# Patient Record
Sex: MALE | Race: WHITE | Employment: OTHER | URBAN - METROPOLITAN AREA
[De-identification: names, ages, dates, MRNs, and addresses within clinical notes are randomized per-mention and may not be internally consistent; named-entity substitution may affect disease eponyms.]

---

## 2017-03-22 ENCOUNTER — OFFICE VISIT (OUTPATIENT)
Dept: PULMONOLOGY | Age: 72
End: 2017-03-22

## 2017-03-22 VITALS
SYSTOLIC BLOOD PRESSURE: 136 MMHG | HEIGHT: 72 IN | DIASTOLIC BLOOD PRESSURE: 86 MMHG | RESPIRATION RATE: 16 BRPM | OXYGEN SATURATION: 97 % | BODY MASS INDEX: 24.11 KG/M2 | WEIGHT: 178 LBS | HEART RATE: 79 BPM | TEMPERATURE: 97.5 F

## 2017-03-22 DIAGNOSIS — J44.9 CHRONIC OBSTRUCTIVE PULMONARY DISEASE, UNSPECIFIED COPD TYPE (HCC): Primary | ICD-10-CM

## 2017-03-22 DIAGNOSIS — Z72.0 TOBACCO ABUSE: ICD-10-CM

## 2017-03-22 PROCEDURE — 3017F COLORECTAL CA SCREEN DOC REV: CPT | Performed by: INTERNAL MEDICINE

## 2017-03-22 PROCEDURE — 1123F ACP DISCUSS/DSCN MKR DOCD: CPT | Performed by: INTERNAL MEDICINE

## 2017-03-22 PROCEDURE — 99213 OFFICE O/P EST LOW 20 MIN: CPT | Performed by: INTERNAL MEDICINE

## 2017-03-22 PROCEDURE — G8420 CALC BMI NORM PARAMETERS: HCPCS | Performed by: INTERNAL MEDICINE

## 2017-03-22 PROCEDURE — G8427 DOCREV CUR MEDS BY ELIG CLIN: HCPCS | Performed by: INTERNAL MEDICINE

## 2017-03-22 PROCEDURE — G8484 FLU IMMUNIZE NO ADMIN: HCPCS | Performed by: INTERNAL MEDICINE

## 2017-03-22 PROCEDURE — 3023F SPIROM DOC REV: CPT | Performed by: INTERNAL MEDICINE

## 2017-03-22 PROCEDURE — 4004F PT TOBACCO SCREEN RCVD TLK: CPT | Performed by: INTERNAL MEDICINE

## 2017-03-22 PROCEDURE — 4040F PNEUMOC VAC/ADMIN/RCVD: CPT | Performed by: INTERNAL MEDICINE

## 2017-03-22 PROCEDURE — G8926 SPIRO NO PERF OR DOC: HCPCS | Performed by: INTERNAL MEDICINE

## 2017-04-05 ENCOUNTER — HOSPITAL ENCOUNTER (OUTPATIENT)
Dept: OTHER | Age: 72
Discharge: HOME | End: 2017-04-05
Attending: FAMILY MEDICINE | Admitting: FAMILY MEDICINE

## 2017-04-05 ENCOUNTER — HOSPITAL ENCOUNTER (OUTPATIENT)
Dept: GENERAL RADIOLOGY | Age: 72
Discharge: HOME | End: 2017-04-05

## 2017-04-05 ENCOUNTER — OFFICE VISIT (OUTPATIENT)
Dept: FAMILY MEDICINE CLINIC | Age: 72
End: 2017-04-05

## 2017-04-05 VITALS
RESPIRATION RATE: 18 BRPM | OXYGEN SATURATION: 95 % | BODY MASS INDEX: 22.65 KG/M2 | DIASTOLIC BLOOD PRESSURE: 80 MMHG | HEART RATE: 90 BPM | WEIGHT: 167 LBS | SYSTOLIC BLOOD PRESSURE: 134 MMHG

## 2017-04-05 DIAGNOSIS — R05.9 COUGH: ICD-10-CM

## 2017-04-05 DIAGNOSIS — R06.02 SOB (SHORTNESS OF BREATH): Primary | ICD-10-CM

## 2017-04-05 DIAGNOSIS — R06.02 SOB (SHORTNESS OF BREATH): ICD-10-CM

## 2017-04-05 PROCEDURE — 1123F ACP DISCUSS/DSCN MKR DOCD: CPT | Performed by: FAMILY MEDICINE

## 2017-04-05 PROCEDURE — 3017F COLORECTAL CA SCREEN DOC REV: CPT | Performed by: FAMILY MEDICINE

## 2017-04-05 PROCEDURE — 99214 OFFICE O/P EST MOD 30 MIN: CPT | Performed by: FAMILY MEDICINE

## 2017-04-05 PROCEDURE — 4004F PT TOBACCO SCREEN RCVD TLK: CPT | Performed by: FAMILY MEDICINE

## 2017-04-05 PROCEDURE — G8427 DOCREV CUR MEDS BY ELIG CLIN: HCPCS | Performed by: FAMILY MEDICINE

## 2017-04-05 PROCEDURE — G8419 CALC BMI OUT NRM PARAM NOF/U: HCPCS | Performed by: FAMILY MEDICINE

## 2017-04-05 PROCEDURE — 93000 ELECTROCARDIOGRAM COMPLETE: CPT | Performed by: FAMILY MEDICINE

## 2017-04-05 PROCEDURE — 4040F PNEUMOC VAC/ADMIN/RCVD: CPT | Performed by: FAMILY MEDICINE

## 2017-04-05 RX ORDER — ALBUTEROL SULFATE 90 UG/1
2 AEROSOL, METERED RESPIRATORY (INHALATION) EVERY 6 HOURS PRN
Qty: 1 INHALER | Refills: 3 | Status: SHIPPED | OUTPATIENT
Start: 2017-04-05 | End: 2019-01-01 | Stop reason: SDUPTHER

## 2017-04-05 RX ORDER — AZITHROMYCIN 250 MG/1
TABLET, FILM COATED ORAL
Qty: 1 PACKET | Refills: 0 | Status: SHIPPED | OUTPATIENT
Start: 2017-04-05 | End: 2017-04-15

## 2017-04-13 ENCOUNTER — HOSPITAL ENCOUNTER (OUTPATIENT)
Dept: CT IMAGING | Age: 72
Discharge: HOME | End: 2017-04-13
Attending: INTERNAL MEDICINE | Admitting: INTERNAL MEDICINE

## 2017-04-13 DIAGNOSIS — C83.18 MANTLE CELL LYMPHOMA OF LYMPH NODES OF MULTIPLE SITES (HCC): ICD-10-CM

## 2017-06-16 PROBLEM — J93.9 PNEUMOTHORAX: Status: ACTIVE | Noted: 2017-06-16

## 2017-06-26 ENCOUNTER — OFFICE VISIT (OUTPATIENT)
Dept: FAMILY MEDICINE CLINIC | Age: 72
End: 2017-06-26

## 2017-06-26 VITALS
DIASTOLIC BLOOD PRESSURE: 80 MMHG | RESPIRATION RATE: 16 BRPM | WEIGHT: 166 LBS | HEART RATE: 83 BPM | OXYGEN SATURATION: 98 % | SYSTOLIC BLOOD PRESSURE: 132 MMHG | BODY MASS INDEX: 21.9 KG/M2

## 2017-06-26 DIAGNOSIS — F51.01 PRIMARY INSOMNIA: ICD-10-CM

## 2017-06-26 DIAGNOSIS — Z09 HOSPITAL DISCHARGE FOLLOW-UP: Primary | ICD-10-CM

## 2017-06-26 DIAGNOSIS — J93.9 PNEUMOTHORAX ON LEFT: ICD-10-CM

## 2017-06-26 PROCEDURE — 99213 OFFICE O/P EST LOW 20 MIN: CPT | Performed by: FAMILY MEDICINE

## 2017-06-26 RX ORDER — TRAZODONE HYDROCHLORIDE 50 MG/1
TABLET ORAL
Qty: 60 TABLET | Refills: 3 | Status: SHIPPED | OUTPATIENT
Start: 2017-06-26 | End: 2018-07-17 | Stop reason: SDUPTHER

## 2017-09-22 ENCOUNTER — HOSPITAL ENCOUNTER (OUTPATIENT)
Dept: CT IMAGING | Age: 72
Discharge: HOME | End: 2017-09-22
Attending: THORACIC SURGERY (CARDIOTHORACIC VASCULAR SURGERY) | Admitting: THORACIC SURGERY (CARDIOTHORACIC VASCULAR SURGERY)

## 2017-09-22 ENCOUNTER — HOSPITAL ENCOUNTER (OUTPATIENT)
Dept: CT IMAGING | Age: 72
Discharge: HOME | End: 2017-09-22
Attending: NURSE PRACTITIONER | Admitting: NURSE PRACTITIONER

## 2017-09-22 VITALS
RESPIRATION RATE: 19 BRPM | SYSTOLIC BLOOD PRESSURE: 148 MMHG | DIASTOLIC BLOOD PRESSURE: 77 MMHG | OXYGEN SATURATION: 98 % | HEART RATE: 58 BPM

## 2017-09-22 VITALS
WEIGHT: 168 LBS | BODY MASS INDEX: 22.26 KG/M2 | HEIGHT: 73 IN | HEART RATE: 90 BPM | OXYGEN SATURATION: 96 % | SYSTOLIC BLOOD PRESSURE: 131 MMHG | DIASTOLIC BLOOD PRESSURE: 80 MMHG | RESPIRATION RATE: 18 BRPM | TEMPERATURE: 99 F

## 2017-09-22 DIAGNOSIS — J93.9 PNEUMOTHORAX: ICD-10-CM

## 2017-09-22 DIAGNOSIS — C83.18 MANTLE CELL LYMPHOMA OF LYMPH NODES OF MULTIPLE SITES (HCC): ICD-10-CM

## 2017-09-22 DIAGNOSIS — J93.9 PNEUMOTHORAX ON LEFT: ICD-10-CM

## 2017-09-22 RX ORDER — OXYCODONE HYDROCHLORIDE AND ACETAMINOPHEN 5; 325 MG/1; MG/1
1 TABLET ORAL EVERY 4 HOURS PRN
Status: DISCONTINUED | OUTPATIENT
Start: 2017-09-22 | End: 2017-09-23 | Stop reason: HOSPADM

## 2017-09-22 RX ORDER — OXYCODONE HYDROCHLORIDE AND ACETAMINOPHEN 5; 325 MG/1; MG/1
2 TABLET ORAL EVERY 4 HOURS PRN
Status: DISCONTINUED | OUTPATIENT
Start: 2017-09-22 | End: 2017-09-23 | Stop reason: HOSPADM

## 2017-09-22 RX ORDER — MIDAZOLAM HYDROCHLORIDE 5 MG/ML
INJECTION INTRAMUSCULAR; INTRAVENOUS
Status: COMPLETED | OUTPATIENT
Start: 2017-09-22 | End: 2017-09-22

## 2017-09-22 RX ORDER — ONDANSETRON 2 MG/ML
4 INJECTION INTRAMUSCULAR; INTRAVENOUS EVERY 8 HOURS PRN
Status: DISCONTINUED | OUTPATIENT
Start: 2017-09-22 | End: 2017-09-23 | Stop reason: HOSPADM

## 2017-09-22 RX ORDER — FENTANYL CITRATE 50 UG/ML
INJECTION, SOLUTION INTRAMUSCULAR; INTRAVENOUS
Status: COMPLETED | OUTPATIENT
Start: 2017-09-22 | End: 2017-09-22

## 2017-09-22 RX ORDER — ACETAMINOPHEN 325 MG/1
650 TABLET ORAL EVERY 4 HOURS PRN
Status: DISCONTINUED | OUTPATIENT
Start: 2017-09-22 | End: 2017-09-23 | Stop reason: HOSPADM

## 2017-09-22 RX ORDER — SODIUM CHLORIDE 9 MG/ML
INJECTION, SOLUTION INTRAVENOUS ONCE
Status: COMPLETED | OUTPATIENT
Start: 2017-09-22 | End: 2017-09-22

## 2017-09-22 RX ADMIN — SODIUM CHLORIDE: 9 INJECTION, SOLUTION INTRAVENOUS at 14:36

## 2017-09-22 RX ADMIN — FENTANYL CITRATE 50 MCG: 50 INJECTION, SOLUTION INTRAMUSCULAR; INTRAVENOUS at 15:46

## 2017-09-22 RX ADMIN — MIDAZOLAM HYDROCHLORIDE 1 MG: 5 INJECTION INTRAMUSCULAR; INTRAVENOUS at 15:46

## 2017-09-22 ASSESSMENT — PAIN - FUNCTIONAL ASSESSMENT: PAIN_FUNCTIONAL_ASSESSMENT: 0-10

## 2017-09-28 ENCOUNTER — TELEPHONE (OUTPATIENT)
Dept: PHARMACY | Facility: CLINIC | Age: 72
End: 2017-09-28

## 2017-09-28 ENCOUNTER — CARE COORDINATION (OUTPATIENT)
Dept: CASE MANAGEMENT | Age: 72
End: 2017-09-28

## 2017-09-29 ENCOUNTER — TELEPHONE (OUTPATIENT)
Dept: FAMILY MEDICINE CLINIC | Age: 72
End: 2017-09-29

## 2017-10-01 ENCOUNTER — CARE COORDINATION (OUTPATIENT)
Dept: CASE MANAGEMENT | Age: 72
End: 2017-10-01

## 2017-10-02 ENCOUNTER — OFFICE VISIT (OUTPATIENT)
Dept: FAMILY MEDICINE CLINIC | Age: 72
End: 2017-10-02

## 2017-10-02 ENCOUNTER — TELEPHONE (OUTPATIENT)
Dept: FAMILY MEDICINE CLINIC | Age: 72
End: 2017-10-02

## 2017-10-02 VITALS
DIASTOLIC BLOOD PRESSURE: 92 MMHG | BODY MASS INDEX: 21.77 KG/M2 | HEART RATE: 83 BPM | SYSTOLIC BLOOD PRESSURE: 138 MMHG | WEIGHT: 165 LBS | OXYGEN SATURATION: 96 % | TEMPERATURE: 98.5 F

## 2017-10-02 DIAGNOSIS — L25.9 CONTACT DERMATITIS, UNSPECIFIED CONTACT DERMATITIS TYPE, UNSPECIFIED TRIGGER: Primary | ICD-10-CM

## 2017-10-02 PROCEDURE — 1123F ACP DISCUSS/DSCN MKR DOCD: CPT | Performed by: NURSE PRACTITIONER

## 2017-10-02 PROCEDURE — G8420 CALC BMI NORM PARAMETERS: HCPCS | Performed by: NURSE PRACTITIONER

## 2017-10-02 PROCEDURE — G8427 DOCREV CUR MEDS BY ELIG CLIN: HCPCS | Performed by: NURSE PRACTITIONER

## 2017-10-02 PROCEDURE — 3017F COLORECTAL CA SCREEN DOC REV: CPT | Performed by: NURSE PRACTITIONER

## 2017-10-02 PROCEDURE — 99213 OFFICE O/P EST LOW 20 MIN: CPT | Performed by: NURSE PRACTITIONER

## 2017-10-02 PROCEDURE — G8484 FLU IMMUNIZE NO ADMIN: HCPCS | Performed by: NURSE PRACTITIONER

## 2017-10-02 PROCEDURE — 4004F PT TOBACCO SCREEN RCVD TLK: CPT | Performed by: NURSE PRACTITIONER

## 2017-10-02 PROCEDURE — 1111F DSCHRG MED/CURRENT MED MERGE: CPT | Performed by: NURSE PRACTITIONER

## 2017-10-02 PROCEDURE — 4040F PNEUMOC VAC/ADMIN/RCVD: CPT | Performed by: NURSE PRACTITIONER

## 2017-10-02 RX ORDER — TRIAMCINOLONE ACETONIDE 1 MG/G
CREAM TOPICAL
Qty: 45 G | Refills: 0 | Status: SHIPPED | OUTPATIENT
Start: 2017-10-02 | End: 2018-01-04 | Stop reason: ALTCHOICE

## 2017-10-02 NOTE — PROGRESS NOTES
Patient: Lincoln oYrk is a 71 y.o. male who presents today with the following Chief Complaint(s):  Chief Complaint   Patient presents with    Rash     blisters around chest tube         HPI   Patient is a 70 yo male who is here with c/o blisters/red rash around the site of his chest tube. Last Tuesday got chest tube removed. Went home last Wednesday. Rash started on Friday. Has been using a guaze and paper tape to cover the CT opening. He states the rash is a little itchy but is tender to touch. Has been taking 25 mg benedryl TID. Started benedryl Saturday. His wife states she was cleaning the area around the insertion site with alcohol pads because it was all blistery. Appt with Dr. Meena Porter Wednesday      Current Outpatient Prescriptions   Medication Sig Dispense Refill    triamcinolone (KENALOG) 0.1 % cream Apply topically 2 times daily. 45 g 0    traZODone (DESYREL) 50 MG tablet 1-2 po qhs for sleep 60 tablet 3    ibrutinib (IMBRUVICA) 140 MG chemo capsule Take 4 capsules by mouth daily Indications: 4 capsules daily 120 capsule 3    albuterol sulfate HFA (PROAIR HFA) 108 (90 BASE) MCG/ACT inhaler Inhale 2 puffs into the lungs every 6 hours as needed for Wheezing or Shortness of Breath 1 Inhaler 3    Multiple Vitamins-Iron (MULTIVITAMIN/IRON PO) Take 1 tablet by mouth daily        No current facility-administered medications for this visit. Patient's past medical history, surgical history, family history, medications,  and allergies  were all reviewed and updated as appropriate today. Review of Systems   Skin: Positive for itching (slight itching sometimes but tender to touch) and rash. Physical Exam   Constitutional: He is oriented to person, place, and time. He appears well-developed and well-nourished. HENT:   Head: Normocephalic and atraumatic. Eyes: Conjunctivae are normal.   Cardiovascular: Normal rate, regular rhythm and normal heart sounds.     No murmur heard.  Pulmonary/Chest: Effort normal and breath sounds normal. No respiratory distress. He has no wheezes. He has no rales. Abdominal:       Neurological: He is alert and oriented to person, place, and time. Skin: Skin is warm and dry. Rash noted. There is erythema. Large red, dried rash with slight oozing around CT insertion site. dermatitis   Psychiatric: He has a normal mood and affect. His behavior is normal. Judgment and thought content normal.   Nursing note and vitals reviewed. Vitals:    10/02/17 1406   BP: (!) 138/92   Pulse: 83   Temp: 98.5 °F (36.9 °C)   SpO2: 96%       Assessment:  Encounter Diagnosis   Name Primary?  Contact dermatitis, unspecified contact dermatitis type, unspecified trigger Yes       Plan:  1. Contact dermatitis, unspecified contact dermatitis type, unspecified trigger    - triamcinolone (KENALOG) 0.1 % cream; Apply topically 2 times daily.   Dispense: 45 g; Refill: 0    Discussed with Dr. Birdie Dancer

## 2017-10-02 NOTE — MR AVS SNAPSHOT
Tetanus Combination Vaccine (1 - Tdap) 1/2/2005    Colonoscopy 4/14/2019    Cholesterol Screening 6/17/2022            MyChart Signup           Our records indicate that you have an active [employer] Studiot account. You can view your After Visit Summary by going to [email or web address] and logging in with your Hark username and password. If you don't have a Hark username and password but a parent or guardian has access to your record, the parent or guardian should login with their own [employer] Studiot username and password and access your record to view the After Visit Summary. Additional Information  If you have questions, please contact the physician practice where you receive care. Remember, Hark is NOT to be used for urgent needs. For medical emergencies, dial 911. For questions regarding your Hark account call [phone]. If you have a clinical question, please call your doctor's office.

## 2017-10-03 ENCOUNTER — CARE COORDINATION (OUTPATIENT)
Dept: CASE MANAGEMENT | Age: 72
End: 2017-10-03

## 2017-10-04 ENCOUNTER — OFFICE VISIT (OUTPATIENT)
Dept: FAMILY MEDICINE CLINIC | Age: 72
End: 2017-10-04

## 2017-10-04 VITALS
BODY MASS INDEX: 21.77 KG/M2 | SYSTOLIC BLOOD PRESSURE: 126 MMHG | HEART RATE: 87 BPM | OXYGEN SATURATION: 97 % | DIASTOLIC BLOOD PRESSURE: 82 MMHG | TEMPERATURE: 97.6 F | WEIGHT: 165 LBS

## 2017-10-04 DIAGNOSIS — L23.1 ALLERGIC CONTACT DERMATITIS DUE TO ADHESIVES: ICD-10-CM

## 2017-10-04 DIAGNOSIS — Z09 HOSPITAL DISCHARGE FOLLOW-UP: Primary | ICD-10-CM

## 2017-10-04 DIAGNOSIS — J93.11 PRIMARY SPONTANEOUS PNEUMOTHORAX: ICD-10-CM

## 2017-10-04 PROCEDURE — 99495 TRANSJ CARE MGMT MOD F2F 14D: CPT | Performed by: FAMILY MEDICINE

## 2017-10-04 NOTE — MR AVS SNAPSHOT
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 ([facility]    Chest pain    Panlobular emphysema ([facility] [address]    Pneumothorax    Autologous bone marrow transplantation    Primary spontaneous pneumothorax    COPD (chronic obstructive pulmonary disease) ([facility] [facility]    Tobacco abuse disorder      Immunizations as of 10/4/2017     Name Date    Influenza Virus Vaccine 12/8/2012    Influenza, High Dose 11/4/2016, 9/28/2015, 10/15/2014    Influenza, MDCK Quadrivalent, Preservative Free 9/23/2017    Pneumococcal 13-valent Conjugate ([id number]) 9/23/2017    Pneumococcal Conjugate 7-valent 9/3/2009    Pneumococcal Polysaccharide ([id number]) 3/24/2014    Tetanus 1/1/2005      Preventive Care        Date Due    Hepatitis C screening is recommended for all adults regardless of risk factors born between [facility] at least once (lifetime) who have never been tested. 1945    Tetanus Combination Vaccine (1 - Tdap) 1/2/2005    Colonoscopy 4/14/2019    Cholesterol Screening 6/17/2022            Orca Digitalt Signup           Our records indicate that you have an active viaCycle account. You can view your After Visit Summary by going to [email or web address] and logging in with your viaCycle username and password. If you don't have a viaCycle username and password but a parent or guardian has access to your record, the parent or guardian should login with their own viaCycle username and password and access your record to view the After Visit Summary. Additional Information  If you have questions, please contact the physician practice where you receive care. Remember, viaCycle is NOT to be used for urgent needs. For medical emergencies, dial 911. For questions regarding your viaCycle account call [phone].  If you have a clinical question, please call your doctor's office.

## 2017-10-04 NOTE — PROGRESS NOTES
Post-Discharge Transitional Care Management Services      [facility]   YOB: 1945    Date of Visit:  10/4/2017    Allergies   Allergen Reactions    Latex Rash     Outpatient Prescriptions Marked as Taking for the 10/4/17 encounter (Office Visit) with Sherly Parrish MD   Medication Sig Dispense Refill    triamcinolone (KENALOG) 0.1 % cream Apply topically 2 times daily. 45 g 0    traZODone (DESYREL) 50 MG tablet 1-2 po qhs for sleep 60 tablet 3    ibrutinib (IMBRUVICA) 140 MG chemo capsule Take 4 capsules by mouth daily Indications: 4 capsules daily 120 capsule 3    albuterol sulfate HFA (PROAIR HFA) 108 (90 BASE) MCG/ACT inhaler Inhale 2 puffs into the lungs every 6 hours as needed for Wheezing or Shortness of Breath 1 Inhaler 3    Multiple Vitamins-Iron (MULTIVITAMIN/IRON PO) Take 1 tablet by mouth daily            Vitals:    10/04/17 1104   BP: 126/82   Site: Left Arm   Position: Sitting   Cuff Size: Small Adult   Pulse: 87   Temp: 97.6 °F (36.4 °C)   TempSrc: Oral   SpO2: 97%     There is no height or weight on file to calculate BMI. Wt Readings from Last 3 Encounters:   10/02/17 165 lb (74.8 kg)   09/27/17 160 lb 9.6 oz (72.8 kg)   09/22/17 168 lb (76.2 kg)     BP Readings from Last 3 Encounters:   10/04/17 126/82   10/02/17 (!) 138/92   09/27/17 (!) 151/70        Patient was admitted to [address] [place] [address] from 9/22/17 to 9/27/17 for spontaneous L pneumothorax. Pt had had R pneumothoraces in past but had not had previous L sided lung collapse. 2 days after d/c home, noted non itchy but painful vesicular rash around site of chest tube. Pt used po benadryl and then TAC cream for rash. Chest tube was removed 9/26/17 but pt was held over noc 2/2 bleeding at the site of chest tube. A heavy pressure dressing was used over vasoline gauze. Pt is latex allergic and all dressings were latex free. Rash is receeding, less painful, no further blisters of d/c.  Center of

## 2017-10-06 ENCOUNTER — CARE COORDINATION (OUTPATIENT)
Dept: CASE MANAGEMENT | Age: 72
End: 2017-10-06

## 2017-10-10 ENCOUNTER — CARE COORDINATION (OUTPATIENT)
Dept: CASE MANAGEMENT | Age: 72
End: 2017-10-10

## 2018-01-04 ENCOUNTER — HOSPITAL ENCOUNTER (OUTPATIENT)
Dept: GENERAL RADIOLOGY | Age: 73
Discharge: HOME | End: 2018-01-04

## 2018-01-04 ENCOUNTER — TELEPHONE (OUTPATIENT)
Dept: FAMILY MEDICINE CLINIC | Age: 73
End: 2018-01-04

## 2018-01-04 ENCOUNTER — HOSPITAL ENCOUNTER (OUTPATIENT)
Dept: OTHER | Age: 73
Discharge: HOME | End: 2018-01-04
Attending: FAMILY MEDICINE | Admitting: FAMILY MEDICINE

## 2018-01-04 ENCOUNTER — OFFICE VISIT (OUTPATIENT)
Dept: FAMILY MEDICINE CLINIC | Age: 73
End: 2018-01-04

## 2018-01-04 VITALS
BODY MASS INDEX: 21.51 KG/M2 | WEIGHT: 163 LBS | HEART RATE: 97 BPM | OXYGEN SATURATION: 95 % | DIASTOLIC BLOOD PRESSURE: 86 MMHG | SYSTOLIC BLOOD PRESSURE: 142 MMHG | TEMPERATURE: 97.6 F

## 2018-01-04 DIAGNOSIS — R06.09 DOE (DYSPNEA ON EXERTION): ICD-10-CM

## 2018-01-04 DIAGNOSIS — R06.2 WHEEZE: ICD-10-CM

## 2018-01-04 DIAGNOSIS — R06.02 SOB (SHORTNESS OF BREATH): ICD-10-CM

## 2018-01-04 DIAGNOSIS — R06.02 SOB (SHORTNESS OF BREATH): Primary | ICD-10-CM

## 2018-01-04 DIAGNOSIS — J44.9 CHRONIC OBSTRUCTIVE PULMONARY DISEASE, UNSPECIFIED COPD TYPE (HCC): ICD-10-CM

## 2018-01-04 DIAGNOSIS — J93.83 SPONTANEOUS PNEUMOTHORAX: ICD-10-CM

## 2018-01-04 DIAGNOSIS — Z72.0 TOBACCO ABUSE DISORDER: ICD-10-CM

## 2018-01-04 PROCEDURE — G8484 FLU IMMUNIZE NO ADMIN: HCPCS | Performed by: FAMILY MEDICINE

## 2018-01-04 PROCEDURE — G8427 DOCREV CUR MEDS BY ELIG CLIN: HCPCS | Performed by: FAMILY MEDICINE

## 2018-01-04 PROCEDURE — 1123F ACP DISCUSS/DSCN MKR DOCD: CPT | Performed by: FAMILY MEDICINE

## 2018-01-04 PROCEDURE — 4040F PNEUMOC VAC/ADMIN/RCVD: CPT | Performed by: FAMILY MEDICINE

## 2018-01-04 PROCEDURE — G8420 CALC BMI NORM PARAMETERS: HCPCS | Performed by: FAMILY MEDICINE

## 2018-01-04 PROCEDURE — 99214 OFFICE O/P EST MOD 30 MIN: CPT | Performed by: FAMILY MEDICINE

## 2018-01-04 PROCEDURE — 4004F PT TOBACCO SCREEN RCVD TLK: CPT | Performed by: FAMILY MEDICINE

## 2018-01-04 PROCEDURE — 93000 ELECTROCARDIOGRAM COMPLETE: CPT | Performed by: FAMILY MEDICINE

## 2018-01-04 PROCEDURE — 3017F COLORECTAL CA SCREEN DOC REV: CPT | Performed by: FAMILY MEDICINE

## 2018-01-04 PROCEDURE — G8926 SPIRO NO PERF OR DOC: HCPCS | Performed by: FAMILY MEDICINE

## 2018-01-04 PROCEDURE — 3023F SPIROM DOC REV: CPT | Performed by: FAMILY MEDICINE

## 2018-01-04 ASSESSMENT — ENCOUNTER SYMPTOMS
COUGH: 1
CHEST TIGHTNESS: 0
WHEEZING: 1
STRIDOR: 1

## 2018-01-04 NOTE — TELEPHONE ENCOUNTER
LM for pt to call back - The radiologist from [facility] called to confirm the pt has a pneumothorax. Per Dr. Becca Shirley pt needs to go to the ER for treatment immediately. She has contacted the ED to let them know to expect him.

## 2018-01-04 NOTE — PROGRESS NOTES
Subjective:      Patient ID: Yamileth Linares is a 71 y.o. male. HPI  Chief Complaint   Patient presents with    Fatigue    Cough    Shortness of Breath     intermittent. x 1 wk    In 9/17, had routine CT from Dr Derrek Bradford for f/u lymphoma, was incidentally found to have recurrent spont pneumothorax. Was admitted for chest tube. Pt was doing well until he noted decreased apptetite 1wk ago, soon thereafter noted episodic sob with or w/o exertion, on/off cough, productive at times. No fever. Feels listless, denies achiness. Notes occ wheeze. No cp. No LE edema. Pt has copd and is a 1 ppd smoker. Review of Systems   Constitutional: Positive for activity change, appetite change and fatigue. Negative for chills, diaphoresis and fever. Respiratory: Positive for cough, wheezing and stridor. Negative for chest tightness. Cardiovascular: Negative for chest pain, palpitations and leg swelling. Objective:   Physical Exam  BP (!) 142/86   Pulse 97   Temp 97.6 °F (36.4 °C) (Oral)   Wt 163 lb (73.9 kg)   SpO2 95%   BMI 21.51 kg/m²   HEENT nl, Neck supple, no lad or tmg, no bruit  CV RRR, Lungs faint L basilar wheeze and faint R basilar coarsenss/rhonchi. No rales  Ext w/o edema  Neg b/l Homans  Assessment:            Plan:      Walker Zarate was seen today for fatigue, cough and shortness of breath. Diagnoses and all orders for this visit:    SOB (shortness of breath), MEDRANO  -     EKG 12 Lead 0-1 mm ST depression in leads 2 and 3, probably not pathologic. Will further eval if cxr neg.  -     XR CHEST STANDARD (2 VW); Future   Unclear etiology. Pna, recurrent spont pneumothorax, copd exacerbation and cardiac etiology considered. Further orders after cxr is available. Spontaneous pneumothorax by history   May need hospitalization for chest tube pending cxr. Chronic obstructive pulmonary disease, unspecified COPD type ([address] Wheeze   tob cessation encouraged.         Addendum: CXR pos for mod-large L

## 2018-01-06 PROBLEM — I48.92 ATRIAL FLUTTER (HCC): Status: ACTIVE | Noted: 2018-01-06

## 2018-01-06 PROBLEM — I48.0 PAF (PAROXYSMAL ATRIAL FIBRILLATION) (HCC): Status: ACTIVE | Noted: 2018-01-06

## 2018-01-09 PROBLEM — I48.3 TYPICAL ATRIAL FLUTTER (HCC): Status: ACTIVE | Noted: 2018-01-06

## 2018-01-13 ENCOUNTER — CARE COORDINATION (OUTPATIENT)
Dept: CASE MANAGEMENT | Age: 73
End: 2018-01-13

## 2018-01-13 DIAGNOSIS — J93.83 SPONTANEOUS PNEUMOTHORAX: Primary | ICD-10-CM

## 2018-01-13 PROCEDURE — 1111F DSCHRG MED/CURRENT MED MERGE: CPT | Performed by: FAMILY MEDICINE

## 2018-01-13 NOTE — CARE COORDINATION
[facility] Transitions Initial Follow Up Call    Call within 2 business days of discharge: Yes    Patient: Debra Khan Patient : 1945   MRN: [id number]  Reason for Admission: Spontaneous Pneumothorax  Discharge Date: 18 RARS: [facility] Risk Score: 17.5     Spoke with: patient's wife Boyd Silvestre: [facility]    Non-face-to-face services provided:  Scheduled appointment with PCP-appt with Dr. Jina Frias  at 3:15  Obtained and reviewed discharge summary and/or continuity of care documents  Education of patient/family/caregiver/guardian to support self-management-s/s when to call MD    Care Transitions 24 Hour Call    Do you have any ongoing symptoms?:  Yes  Patient-reported symptoms:  Pain, Fatigue  Do you have a copy of your discharge instructions?:  Yes  Do you have all of your prescriptions and are they filled?:  Yes  Have you been contacted by a Zuhair Patel Pharmacist?:  No  Have you scheduled your follow up appointment?:  Yes  Were you discharged with any Home Care or Post Acute Services:  No  Patient Home Equipment:  Nebulizer  Do you have support at home?:  Partner/Spouse/SO  Are you an active caregiver in your home?:  No  Care Transitions Interventions     Other Services:  Declined (Comment: [facility])      Wife reports he's doing good just tired. States he's getting around well. Has pain at CT removal site only when he moves certain ways, has not had to take any pain medications. States she will be changing dressing today. Told her to look for any yellow drainage, inflammation, swelling, warmth, fever and notify MD if present. Said his breathing is fine, told her to make sure he is doing deep breathing exercises, using IS at least hourly while awake. Completed med review. She allowed me to schedule f/u PCP appt. Denied any needs. Agreeable to f/u calls.      Follow Up  Future Appointments  Date Time Provider Gricelda Hurtado   2018 3:15 PM MD LUCILA Chacon

## 2018-01-15 ENCOUNTER — TELEPHONE (OUTPATIENT)
Dept: CARDIOTHORACIC SURGERY | Age: 73
End: 2018-01-15

## 2018-01-15 NOTE — TELEPHONE ENCOUNTER
Patient underwent LVAT, EDSON wedge, and mech and talc pleurodesis on 1/8/18 with Dr. Laron Gonzalez. He was discharged home with no home care on 1/12/18. I called him today for his surgical follow up. I spoke to his wife. She reports that he is doing fine. Denies SOB. He is using his IS as instructed and get it up to 2500. He does have occasional productive cough with loose, clear sputum. Denies fever. Incisions are healing well. There is redness and drainage from his CT site. His wife describes it as serosanguinous. He has developed a rash since surgery that they think may be related to an allergic reaction to tape. His appetite is fair but improving. Having regular bowel movements. Pain is well controlled with medications. Sleeping well at night. Due to the redness and drainage around CT site, the patient and his wife would feel more comfortable coming in to see us in the office this week instead of next week. I went ahead and added him to our schedule. No further questions or concerns at this time.

## 2018-01-17 ENCOUNTER — OFFICE VISIT (OUTPATIENT)
Dept: CARDIOTHORACIC SURGERY | Age: 73
End: 2018-01-17

## 2018-01-17 VITALS
OXYGEN SATURATION: 94 % | DIASTOLIC BLOOD PRESSURE: 82 MMHG | TEMPERATURE: 97 F | HEART RATE: 86 BPM | BODY MASS INDEX: 21.07 KG/M2 | HEIGHT: 73 IN | WEIGHT: 159 LBS | SYSTOLIC BLOOD PRESSURE: 134 MMHG

## 2018-01-17 DIAGNOSIS — J44.9 CHRONIC OBSTRUCTIVE PULMONARY DISEASE, UNSPECIFIED COPD TYPE (HCC): ICD-10-CM

## 2018-01-17 DIAGNOSIS — J93.11 PRIMARY SPONTANEOUS PNEUMOTHORAX: Primary | ICD-10-CM

## 2018-01-17 PROCEDURE — 99024 POSTOP FOLLOW-UP VISIT: CPT | Performed by: THORACIC SURGERY (CARDIOTHORACIC VASCULAR SURGERY)

## 2018-01-17 NOTE — PROGRESS NOTES
Cardiac, Vascular [facility] Thoracic Surgeons  Clinic Note     1/17/2018 4:57 PM  Surgeon:  Carly Moss     S/P : Left video-assisted thoracoscopy with pleurodesis     Chief complaint :  post op follow-up  Subjective:  Mr. Saumya Lamb   No major complain or event since seen last  Vital Signs: /82 (Site: Right Arm, Position: Sitting, Cuff Size: Medium Adult)   Pulse 86   Temp 97 °F (36.1 °C) (Oral)   Ht 6' 1\" (1.854 m)   Wt 159 lb (72.1 kg)   SpO2 94%   BMI 20.98 kg/m²      I/O:  No intake or output data in the 24 hours ending 01/17/18 1657    Exam:   Cardiovascular: S1 plus S2 +0 no additional sounds  Pulmonary: Bilaterally clear      Incision: Dry and clean    Labs:   CBC: No results for input(s): WBC, HGB, HCT, MCV, PLT in the last 72 hours. BMP: No results for input(s): NA, K, CL, CO2, PHOS, BUN, CREATININE in the last 72 hours. Invalid input(s): CA  PT/INR: No results for input(s): PROTIME, INR in the last 72 hours. APTT: No results for input(s): APTT in the last 72 hours. 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 Chest x-ray showed full expansion with good fibrotic reaction  2.  No need for field to follow-up follow-up in a p.r.n. basis    Joey Bourne MD FACS

## 2018-01-18 ENCOUNTER — CARE COORDINATION (OUTPATIENT)
Dept: CASE MANAGEMENT | Age: 73
End: 2018-01-18

## 2018-01-18 ENCOUNTER — TELEPHONE (OUTPATIENT)
Dept: PULMONOLOGY | Age: 73
End: 2018-01-18

## 2018-01-22 ENCOUNTER — OFFICE VISIT (OUTPATIENT)
Dept: FAMILY MEDICINE CLINIC | Age: 73
End: 2018-01-22

## 2018-01-22 VITALS
BODY MASS INDEX: 21.51 KG/M2 | WEIGHT: 163 LBS | OXYGEN SATURATION: 96 % | SYSTOLIC BLOOD PRESSURE: 104 MMHG | DIASTOLIC BLOOD PRESSURE: 60 MMHG | HEART RATE: 70 BPM

## 2018-01-22 DIAGNOSIS — Z09 HOSPITAL DISCHARGE FOLLOW-UP: Primary | ICD-10-CM

## 2018-01-22 DIAGNOSIS — I48.0 PAROXYSMAL ATRIAL FIBRILLATION (HCC): ICD-10-CM

## 2018-01-22 DIAGNOSIS — J93.83 SPONTANEOUS PNEUMOTHORAX: ICD-10-CM

## 2018-01-22 PROCEDURE — 1111F DSCHRG MED/CURRENT MED MERGE: CPT | Performed by: FAMILY MEDICINE

## 2018-01-22 PROCEDURE — 99495 TRANSJ CARE MGMT MOD F2F 14D: CPT | Performed by: FAMILY MEDICINE

## 2018-01-22 NOTE — PROGRESS NOTES
had new onset, asymptomatic P A Fib. No obvious P A fib since home. Pt had rash from adhesive around CT site as he had in past. Kenolog janet marie. Pt jeff is sensitive/allergic to adhesive. Non face to face  following discharge, date last encounter closed (first attempt may have been earlier): 1/13/2018 11:10 AM    Call initiated 2 business days of discharge: Yes     Interval history/Current status: As above. Feeling better, no longer sob, energy is gradually returning. Physical Exam:  /60 (Site: Left Arm, Position: Sitting, Cuff Size: Medium Adult)   Pulse 70   Wt 163 lb (73.9 kg)   SpO2 96%   BMI 21.51 kg/m²   Neck supple, no lad or tmg, no bruit  CV RRR, Lungs CTA  L lat chest wall with resolving contact derm  4 L lat chest wall scabs from CT and pleurodesis, healing well        Assessment/Plan:  Prakash Richter was seen today for follow-up from hospital.    Diagnoses and all orders for this visit:    Hospital discharge follow-up  -     AK DISCHARGE MEDS RECONCILED W/ CURRENT OUTPATIENT MED LIST    Spontaneous pneumothorax   Clinically resolved    Paroxysmal atrial fibrillation (HCC)   Will follow.  CPM.        Medical Decision Making: moderate complexity

## 2018-01-23 PROBLEM — J93.9 PNEUMOTHORAX: Status: RESOLVED | Noted: 2017-06-16 | Resolved: 2018-01-23

## 2018-01-25 ENCOUNTER — CARE COORDINATION (OUTPATIENT)
Dept: CASE MANAGEMENT | Age: 73
End: 2018-01-25

## 2018-01-25 NOTE — CARE COORDINATION
[facility] Transitions Follow Up Call    2018    Patient: Samm Burn  Patient : 1945   MRN: [id number]  Reason for Admission: Spontaneous Pneumothorax  Discharge Date: 18 RARS: Risk Score: 17.5       Spoke with: wife [facility] Transitions Subsequent and Final Call    Subsequent and Final Calls  Have your medications changed?:  No  Do you have any questions related to your medications?:  No  Do you currently have any active services?:  No  Do you have any needs or concerns that I can assist you with?:  No  Identified Barriers:  None  Care Transitions Interventions     Other Services:  Declined (Comment: [facility])   Other Interventions:        Informed wife of final transition call. She states patient is doing a lot better, said he seems to have \"turned the bend\". Said his breathing is good, no acute episodes of SOB, pain has subsided, is up and moving around. Still using IS and acapella, knows he needs to keep deep breathing to prevent pneumonia. Appetite improving. Said rash is starting to clear up. Educated patient on s/s of the flu:  Fever or feeling feverish/chills,Cough, Sore throat, Runny or stuffy nose, Muscle or body aches, Headaches Fatigue (tiredness), Some people may have vomiting and diarrhea, Difficulty breathing. Advised patient to notify PCP/seek medical attention at onset of any of the above sx. Advised patient to avoid crowds unnecessarily, wash hands frequently with soap and water and to get flu vaccine if they haven't already done so if isn't medically contraindicated. She verbalized understanding and was in agreement, said they have both been staying in the house and not letting visitors come over. Denies any needs at present time, very appreciative of f/u calls. No further CTC outreach indicated.     Follow Up  Future Appointments  Date Time Provider Gricelda Hurtado   2018 1:30 PM Mounika Hickey CNP D.A.M. [employer] [facility]       Ligia Andrews

## 2018-02-13 ENCOUNTER — HOSPITAL ENCOUNTER (OUTPATIENT)
Dept: CT IMAGING | Age: 73
Discharge: HOME | End: 2018-02-13
Attending: INTERNAL MEDICINE | Admitting: INTERNAL MEDICINE

## 2018-02-13 DIAGNOSIS — C83.18 LYMPHOMA, MANTLE CELL, MULTIPLE SITES (HCC): ICD-10-CM

## 2018-02-13 DIAGNOSIS — C83.18 MANTLE CELL LYMPHOMA OF LYMPH NODES OF MULTIPLE SITES (HCC): ICD-10-CM

## 2018-02-13 LAB
A/G RATIO: 1.5 (ref 1.1–2.2)
ALBUMIN SERPL-MCNC: 3.7 G/DL (ref 3.4–5)
ALP BLD-CCNC: 108 U/L (ref 40–129)
ALT SERPL-CCNC: 12 U/L (ref 10–40)
ANION GAP SERPL CALCULATED.3IONS-SCNC: 9 MMOL/L (ref 3–16)
AST SERPL-CCNC: 17 U/L (ref 15–37)
BILIRUB SERPL-MCNC: 0.6 MG/DL (ref 0–1)
BUN BLDV-MCNC: 11 MG/DL (ref 7–20)
CALCIUM SERPL-MCNC: 9.2 MG/DL (ref 8.3–10.6)
CHLORIDE BLD-SCNC: 100 MMOL/L (ref 99–110)
CO2: 28 MMOL/L (ref 21–32)
CREAT SERPL-MCNC: 0.9 MG/DL (ref 0.8–1.3)
GFR AFRICAN AMERICAN: >60
GFR NON-AFRICAN AMERICAN: >60
GLOBULIN: 2.5 G/DL
GLUCOSE BLD-MCNC: 94 MG/DL (ref 70–99)
POTASSIUM SERPL-SCNC: 4.2 MMOL/L (ref 3.5–5.1)
SODIUM BLD-SCNC: 137 MMOL/L (ref 136–145)
TOTAL PROTEIN: 6.2 G/DL (ref 6.4–8.2)

## 2018-02-21 ENCOUNTER — OFFICE VISIT (OUTPATIENT)
Dept: CARDIOLOGY CLINIC | Age: 73
End: 2018-02-21

## 2018-02-21 VITALS
WEIGHT: 163 LBS | SYSTOLIC BLOOD PRESSURE: 142 MMHG | HEIGHT: 73 IN | DIASTOLIC BLOOD PRESSURE: 82 MMHG | BODY MASS INDEX: 21.6 KG/M2 | HEART RATE: 80 BPM

## 2018-02-21 DIAGNOSIS — I48.3 TYPICAL ATRIAL FLUTTER (HCC): ICD-10-CM

## 2018-02-21 DIAGNOSIS — I48.0 PAF (PAROXYSMAL ATRIAL FIBRILLATION) (HCC): Primary | ICD-10-CM

## 2018-02-21 PROCEDURE — 1036F TOBACCO NON-USER: CPT | Performed by: NURSE PRACTITIONER

## 2018-02-21 PROCEDURE — 3017F COLORECTAL CA SCREEN DOC REV: CPT | Performed by: NURSE PRACTITIONER

## 2018-02-21 PROCEDURE — 93000 ELECTROCARDIOGRAM COMPLETE: CPT | Performed by: NURSE PRACTITIONER

## 2018-02-21 PROCEDURE — 1123F ACP DISCUSS/DSCN MKR DOCD: CPT | Performed by: NURSE PRACTITIONER

## 2018-02-21 PROCEDURE — 99213 OFFICE O/P EST LOW 20 MIN: CPT | Performed by: NURSE PRACTITIONER

## 2018-02-21 PROCEDURE — G8420 CALC BMI NORM PARAMETERS: HCPCS | Performed by: NURSE PRACTITIONER

## 2018-02-21 PROCEDURE — G8427 DOCREV CUR MEDS BY ELIG CLIN: HCPCS | Performed by: NURSE PRACTITIONER

## 2018-02-21 PROCEDURE — G8484 FLU IMMUNIZE NO ADMIN: HCPCS | Performed by: NURSE PRACTITIONER

## 2018-02-21 PROCEDURE — 4040F PNEUMOC VAC/ADMIN/RCVD: CPT | Performed by: NURSE PRACTITIONER

## 2018-02-21 NOTE — PROGRESS NOTES
[address]   Electrophysiology  Note              Date:  February 21, 2018  Patient name: Sarah Chen  YOB: 1945    Primary Care physician: NAZ DUARTE MD    HISTORY OF PRESENT ILLNESS: The patient is a 71 y.o.  male with a past medical history of PAF, atrial flutter, COPD, and tobacco abuse. Echo in 6/2017 showed an EF of 45-50%. He was admitted in 1/2018 with shortness of breath and was diagnosed with pneumothorax. He had a VATS. PAF and atrial flutter were noted during his hospital stay and he was discharged on Cardizem. Today he is being seen for PAF and atrial flutter. His EKG shows sinus rhythm with a HR of 79. BP is elevated. He reports his home BP as averaging 128/70. He is feeling well and denies chest pain, palpitations, shortness of breath, and dizziness. Past Medical History:   has a past medical history of Blood transfusion; Cancer ([address] COPD, mild ([address] Disease of blood and blood forming organ; History of blood transfusion; Non Hodgkin's lymphoma ([address] Pneumonia; Pneumothorax; and Spontaneous pneumothorax. Past Surgical History:   has a past surgical history that includes Femur Surgery; Middle ear surgery; chest tube insertion; lymph node biopsy (12/11/12); Stomach surgery; Skin cancer destruction; Colonoscopy (4/9); Colonoscopy (5/15/13); Tunneled venous port placement; fracture surgery (1960); fracture surgery (2003); other surgical history (6/7/12); skin biopsy; and Thoracoscopy (Left, 01/08/2018). Home Medications:    Prior to Admission medications    Medication Sig Start Date End Date Taking?  Authorizing Provider   aspirin 325 MG tablet Take 1 tablet by mouth daily 1/13/18  Yes Sunni Rebolledo MD   diltiazem (CARDIZEM CD) 120 MG extended release capsule Take 1 capsule by mouth daily 1/12/18  Yes Janeth Ruelas CNP   traZODone (DESYREL) 50 MG tablet 1-2 po qhs for sleep 6/26/17  Yes Radha Huynh MD   ibrutinib (Shelton Bang) 140 MG chemo capsule Take 4 capsules by mouth daily Indications: 4 capsules daily 6/6/17  Yes Nicole Florez MD   albuterol sulfate HFA (PROAIR HFA) 108 (90 BASE) MCG/ACT inhaler Inhale 2 puffs into the lungs every 6 hours as needed for Wheezing or Shortness of Breath 4/5/17  Yes Beto Her MD   Multiple Vitamins-Iron (MULTIVITAMIN/IRON PO) Take 1 tablet by mouth daily    Yes Historical Provider, MD       Allergies:  Latex and Adhesive tape    Social History:   reports that he quit smoking about 2 months ago. His smoking use included Cigarettes. He has a 50.00 pack-year smoking history. He has never used smokeless tobacco. He reports that he drinks alcohol. He reports that he does not use drugs. Family History: family history includes Alcohol Abuse in his sister; COPD in his brother; Cancer in his brother and brother; High Blood Pressure in his father; Liver Disease in his sister; Stroke (age of onset: 68) in his father. Review of Systems   Constitutional: Negative. HENT: Negative. Eyes: Negative. Respiratory: Negative. Cardiovascular: see HPI  Gastrointestinal: Negative. Genitourinary: Negative. Musculoskeletal: Negative. Skin: Negative. Neurological: Negative. Hematological: Negative. Psychiatric/Behavioral: Negative. PHYSICAL EXAM:    Physical Examination:    BP (!) 142/82   Pulse 80   Ht 6' 1\" (1.854 m)   Wt 163 lb (73.9 kg)   BMI 21.51 kg/m²      Constitutional and general appearance: alert, cooperative, no distress and appears stated age  HEENT: PERRL, no cervical lymphadenopathy. No masses palpable.  Normal oral mucosa  Respiratory:  · Normal excursion and expansion without use of accessory muscles  · Resp auscultation: Normal breath sounds without dullness or wheezing  Cardiovascular:  · The apical impulse is not displaced  · Heart tones are crisp and normal. regular S1 and S2.  · Jugular venous pulsation Normal  · The carotid upstroke is normal in amplitude and

## 2018-02-21 NOTE — LETTER
biopsy; and Thoracoscopy (Left, 01/08/2018). Home Medications:    Prior to Admission medications    Medication Sig Start Date End Date Taking? Authorizing Provider   aspirin 325 MG tablet Take 1 tablet by mouth daily 1/13/18  Yes Oneil Boogie MD   diltiazem (CARDIZEM CD) 120 MG extended release capsule Take 1 capsule by mouth daily 1/12/18  Yes Leslie Parada CNP   traZODone (DESYREL) 50 MG tablet 1-2 po qhs for sleep 6/26/17  Yes Luma Gill MD   ibrutinib (IMBRUVICA) 140 MG chemo capsule Take 4 capsules by mouth daily Indications: 4 capsules daily 6/6/17  Yes Rush Gutierrez MD   albuterol sulfate HFA (PROAIR HFA) 108 (90 BASE) MCG/ACT inhaler Inhale 2 puffs into the lungs every 6 hours as needed for Wheezing or Shortness of Breath 4/5/17  Yes Luma Gill MD   Multiple Vitamins-Iron (MULTIVITAMIN/IRON PO) Take 1 tablet by mouth daily    Yes Historical Provider, MD       Allergies:  Latex and Adhesive tape    Social History:   reports that he quit smoking about 2 months ago. His smoking use included Cigarettes. He has a 50.00 pack-year smoking history. He has never used smokeless tobacco. He reports that he drinks alcohol. He reports that he does not use drugs. Family History: family history includes Alcohol Abuse in his sister; COPD in his brother; Cancer in his brother and brother; High Blood Pressure in his father; Liver Disease in his sister; Stroke (age of onset: 68) in his father. Review of Systems   Constitutional: Negative. HENT: Negative. Eyes: Negative. Respiratory: Negative. Cardiovascular: see HPI  Gastrointestinal: Negative. Genitourinary: Negative. Musculoskeletal: Negative. Skin: Negative. Neurological: Negative. Hematological: Negative. Psychiatric/Behavioral: Negative.     PHYSICAL EXAM:    Physical Examination:    BP (!) 142/82   Pulse 80   Ht 6' 1\" (1.854 m)   Wt 163 lb (73.9 kg)   BMI 21.51 kg/m² Constitutional and general appearance: alert, cooperative, no distress and appears stated age  [de-identified]: PERRL, no cervical lymphadenopathy. No masses palpable. Normal oral mucosa  Respiratory:  · Normal excursion and expansion without use of accessory muscles  · Resp auscultation: Normal breath sounds without dullness or wheezing  Cardiovascular:  · The apical impulse is not displaced  · Heart tones are crisp and normal. regular S1 and S2.  · Jugular venous pulsation Normal  · The carotid upstroke is normal in amplitude and contour without delay or bruit  · Peripheral pulses are symmetrical and full   Abdomen:  · No masses or tenderness  · Bowel sounds present  Extremities:  ·  No cyanosis or clubbing  ·  No lower extremity edema  ·  Skin: warm and dry  Neurological:  · Alert and oriented  · Moves all extremities well  · No abnormalities of mood, affect, memory, mentation, or behavior are noted    DATA:    ECG 2/21/2018:  SR HR 79    Echo 8/30/0033:  LV systolic function is mildly reduced with an estimated EF of 45-50%. There is mild global hypokinesis. Grade I diastolic dysfunction with normal filling pressure. CARDIOLOGY LABS:   CBC: No results for input(s): WBC, HGB, HCT, PLT in the last 72 hours. BMP: No results for input(s): NA, K, CO2, BUN, CREATININE, LABGLOM, GLUCOSE in the last 72 hours. PT/INR: No results for input(s): PROTIME, INR in the last 72 hours. APTT:No results for input(s): APTT in the last 72 hours. FASTING LIPID PANEL:  Lab Results   Component Value Date    HDL 42 06/17/2017    HDL 34 11/30/2011    LDLCALC 103 06/17/2017    TRIG 61 06/17/2017     LIVER PROFILE:No results for input(s): AST, ALT, ALB in the last 72 hours. Assessment:   1. Paroxysmal atrial fibrillation and atrial flutter: noted during hospitalization in 1/2018    -on ASA for [id number] score 1 (age)  2.  Sinus bradycardia: noted in hospital, resolved

## 2018-03-04 NOTE — COMMUNICATION BODY
and contour without delay or bruit  · Peripheral pulses are symmetrical and full   Abdomen:  · No masses or tenderness  · Bowel sounds present  Extremities:  ·  No cyanosis or clubbing  ·  No lower extremity edema  ·  Skin: warm and dry  Neurological:  · Alert and oriented  · Moves all extremities well  · No abnormalities of mood, affect, memory, mentation, or behavior are noted    DATA:    ECG 2/21/2018:  SR HR 79    Echo 2/16/1998:  LV systolic function is mildly reduced with an estimated EF of 45-50%. There is mild global hypokinesis. Grade I diastolic dysfunction with normal filling pressure. CARDIOLOGY LABS:   CBC: No results for input(s): WBC, HGB, HCT, PLT in the last 72 hours. BMP: No results for input(s): NA, K, CO2, BUN, CREATININE, LABGLOM, GLUCOSE in the last 72 hours. PT/INR: No results for input(s): PROTIME, INR in the last 72 hours. APTT:No results for input(s): APTT in the last 72 hours. FASTING LIPID PANEL:  Lab Results   Component Value Date    HDL 42 06/17/2017    HDL 34 11/30/2011    LDLCALC 103 06/17/2017    TRIG 61 06/17/2017     LIVER PROFILE:No results for input(s): AST, ALT, ALB in the last 72 hours. Assessment:   1. Paroxysmal atrial fibrillation and atrial flutter: noted during hospitalization in 1/2018    -on ASA for [id number] score 1 (age)  2. Sinus bradycardia: noted in hospital, resolved  3. Pneumothorax: s/p VATS, wedge resection, and pleurodesis on 1/8/2018  4. History of lymphoma   5. COPD  6. Tobacco abuse: quit smoking in 12/20017    Plan:   1. No changes today   2. Continue ASA and Cardizem   3.  Follow up in 6 months    Mariana Chen, [address]  [phone]

## 2018-03-26 RX ORDER — ASPIRIN 325 MG
TABLET, DELAYED RELEASE (ENTERIC COATED) ORAL
Qty: 30 TABLET | Refills: 5 | Status: SHIPPED | OUTPATIENT
Start: 2018-03-26 | End: 2018-08-21 | Stop reason: DRUGHIGH

## 2018-05-14 ENCOUNTER — TELEPHONE (OUTPATIENT)
Dept: FAMILY MEDICINE CLINIC | Age: 73
End: 2018-05-14

## 2018-05-16 ENCOUNTER — OFFICE VISIT (OUTPATIENT)
Dept: FAMILY MEDICINE CLINIC | Age: 73
End: 2018-05-16

## 2018-05-16 VITALS
DIASTOLIC BLOOD PRESSURE: 80 MMHG | SYSTOLIC BLOOD PRESSURE: 138 MMHG | BODY MASS INDEX: 20.98 KG/M2 | WEIGHT: 159 LBS | OXYGEN SATURATION: 95 % | HEART RATE: 76 BPM

## 2018-05-16 DIAGNOSIS — L03.011 CELLULITIS OF FINGER OF RIGHT HAND: Primary | ICD-10-CM

## 2018-05-16 DIAGNOSIS — F41.9 ANXIOUSNESS: ICD-10-CM

## 2018-05-16 PROCEDURE — G8427 DOCREV CUR MEDS BY ELIG CLIN: HCPCS | Performed by: FAMILY MEDICINE

## 2018-05-16 PROCEDURE — 1036F TOBACCO NON-USER: CPT | Performed by: FAMILY MEDICINE

## 2018-05-16 PROCEDURE — 1123F ACP DISCUSS/DSCN MKR DOCD: CPT | Performed by: FAMILY MEDICINE

## 2018-05-16 PROCEDURE — 4040F PNEUMOC VAC/ADMIN/RCVD: CPT | Performed by: FAMILY MEDICINE

## 2018-05-16 PROCEDURE — 99213 OFFICE O/P EST LOW 20 MIN: CPT | Performed by: FAMILY MEDICINE

## 2018-05-16 PROCEDURE — G8420 CALC BMI NORM PARAMETERS: HCPCS | Performed by: FAMILY MEDICINE

## 2018-05-16 PROCEDURE — 3017F COLORECTAL CA SCREEN DOC REV: CPT | Performed by: FAMILY MEDICINE

## 2018-05-16 RX ORDER — HYDROXYZINE HYDROCHLORIDE 25 MG/1
25 TABLET, FILM COATED ORAL 2 TIMES DAILY PRN
Qty: 30 TABLET | Refills: 1 | Status: ON HOLD | OUTPATIENT
Start: 2018-05-16 | End: 2020-01-01

## 2018-07-17 DIAGNOSIS — F51.01 PRIMARY INSOMNIA: ICD-10-CM

## 2018-07-17 RX ORDER — TRAZODONE HYDROCHLORIDE 50 MG/1
TABLET ORAL
Qty: 60 TABLET | Refills: 5 | Status: SHIPPED | OUTPATIENT
Start: 2018-07-17 | End: 2019-05-13 | Stop reason: SDUPTHER

## 2018-08-13 ENCOUNTER — OFFICE VISIT (OUTPATIENT)
Dept: FAMILY MEDICINE CLINIC | Age: 73
End: 2018-08-13

## 2018-08-13 VITALS
WEIGHT: 155 LBS | BODY MASS INDEX: 20.45 KG/M2 | SYSTOLIC BLOOD PRESSURE: 126 MMHG | OXYGEN SATURATION: 97 % | HEART RATE: 80 BPM | DIASTOLIC BLOOD PRESSURE: 80 MMHG

## 2018-08-13 DIAGNOSIS — J44.9 CHRONIC OBSTRUCTIVE PULMONARY DISEASE, UNSPECIFIED COPD TYPE (HCC): Primary | ICD-10-CM

## 2018-08-13 DIAGNOSIS — I48.0 PAF (PAROXYSMAL ATRIAL FIBRILLATION) (HCC): ICD-10-CM

## 2018-08-13 DIAGNOSIS — Z94.81 AUTOLOGOUS BONE MARROW TRANSPLANTATION STATUS (HCC): ICD-10-CM

## 2018-08-13 DIAGNOSIS — C83.18 MANTLE CELL LYMPHOMA OF LYMPH NODES OF MULTIPLE SITES (HCC): ICD-10-CM

## 2018-08-13 PROCEDURE — 99213 OFFICE O/P EST LOW 20 MIN: CPT | Performed by: FAMILY MEDICINE

## 2018-08-13 PROCEDURE — 3023F SPIROM DOC REV: CPT | Performed by: FAMILY MEDICINE

## 2018-08-13 PROCEDURE — G8926 SPIRO NO PERF OR DOC: HCPCS | Performed by: FAMILY MEDICINE

## 2018-08-13 PROCEDURE — G8420 CALC BMI NORM PARAMETERS: HCPCS | Performed by: FAMILY MEDICINE

## 2018-08-13 PROCEDURE — 1036F TOBACCO NON-USER: CPT | Performed by: FAMILY MEDICINE

## 2018-08-13 PROCEDURE — 1123F ACP DISCUSS/DSCN MKR DOCD: CPT | Performed by: FAMILY MEDICINE

## 2018-08-13 PROCEDURE — 3017F COLORECTAL CA SCREEN DOC REV: CPT | Performed by: FAMILY MEDICINE

## 2018-08-13 PROCEDURE — 4040F PNEUMOC VAC/ADMIN/RCVD: CPT | Performed by: FAMILY MEDICINE

## 2018-08-13 PROCEDURE — 1101F PT FALLS ASSESS-DOCD LE1/YR: CPT | Performed by: FAMILY MEDICINE

## 2018-08-13 PROCEDURE — G8427 DOCREV CUR MEDS BY ELIG CLIN: HCPCS | Performed by: FAMILY MEDICINE

## 2018-08-13 RX ORDER — DILTIAZEM HYDROCHLORIDE 120 MG/1
120 CAPSULE, COATED, EXTENDED RELEASE ORAL DAILY
Qty: 30 CAPSULE | Refills: 5 | Status: SHIPPED | OUTPATIENT
Start: 2018-08-13 | End: 2018-08-13 | Stop reason: SDUPTHER

## 2018-08-13 ASSESSMENT — PATIENT HEALTH QUESTIONNAIRE - PHQ9
SUM OF ALL RESPONSES TO PHQ9 QUESTIONS 1 & 2: 0
2. FEELING DOWN, DEPRESSED OR HOPELESS: 0
SUM OF ALL RESPONSES TO PHQ QUESTIONS 1-9: 0
1. LITTLE INTEREST OR PLEASURE IN DOING THINGS: 0
SUM OF ALL RESPONSES TO PHQ QUESTIONS 1-9: 0

## 2018-08-13 ASSESSMENT — ENCOUNTER SYMPTOMS
COUGH: 0
SHORTNESS OF BREATH: 1

## 2018-08-13 NOTE — PATIENT INSTRUCTIONS
Please ask Dr Burt Ferrara if you need the chicken pox vaccine. Please get Tdap at your local pharmacy.

## 2018-08-13 NOTE — PROGRESS NOTES
Patient: Cisco Sarah is a 68 y.o. male who presents today with the following Chief Complaint(s):  Chief Complaint   Patient presents with    6 Month Follow-Up         HPI:  male with a past medical history of PAF, atrial flutter, NHL, COPD, and tobacco abuse. On 5/16/18, atarax was rx'd for occ anxiety. Has taken 2-3 times only, has found it has helped anxiety. Cont's to struggle with tob use, is trying to lessen use. Mild sob is stable. Sees Dr Juan Pablo Tafoya, has CT this wk and q 6 mo, will f/u with onc next wk and will cont q 3 mo visits. At time of stem cell tx, was given new set of vaccines. Was told chicken pox titer was neg and did not need shingles vaccine. Dr Rolly Barker will tx R forearm and chest BCC or SCC found on recent bx. Current Outpatient Prescriptions   Medication Sig Dispense Refill    traZODone (DESYREL) 50 MG tablet TAKE 1 TO 2 TABLETS BY MOUTH EVERY NIGHT AT BEDTIME FOR SLEEP 60 tablet 5    hydrOXYzine (ATARAX) 25 MG tablet Take 1 tablet by mouth 2 times daily as needed for Anxiety 30 tablet 1    aspirin 325 MG EC tablet TAKE 1 TABLET BY MOUTH DAILY 30 tablet 5    ibrutinib (IMBRUVICA) 140 MG chemo capsule Take 4 capsules by mouth daily Indications: 4 capsules daily 120 capsule 3    albuterol sulfate HFA (PROAIR HFA) 108 (90 BASE) MCG/ACT inhaler Inhale 2 puffs into the lungs every 6 hours as needed for Wheezing or Shortness of Breath 1 Inhaler 3    Multiple Vitamins-Iron (MULTIVITAMIN/IRON PO) Take 1 tablet by mouth daily       diltiazem (CARDIZEM CD) 120 MG extended release capsule Take 1 capsule by mouth daily 30 capsule 5     No current facility-administered medications for this visit. Patient's past medical history, surgical history, family history, medications,  and allergies  were all reviewed and updated as appropriate today. Review of Systems   Constitutional: Negative for fatigue and fever. Respiratory: Positive for shortness of breath.  Negative for

## 2018-08-15 ENCOUNTER — HOSPITAL ENCOUNTER (OUTPATIENT)
Dept: CT IMAGING | Age: 73
Discharge: HOME | End: 2018-08-15
Payer: MEDICARE

## 2018-08-15 ENCOUNTER — HOSPITAL ENCOUNTER (OUTPATIENT)
Age: 73
Discharge: HOME | End: 2018-08-15
Payer: MEDICARE

## 2018-08-15 DIAGNOSIS — C83.18 MANTLE CELL LYMPHOMA OF LYMPH NODES OF MULTIPLE REGIONS (HCC): ICD-10-CM

## 2018-08-15 LAB
A/G RATIO: 1.8 (ref 1.1–2.2)
ALBUMIN SERPL-MCNC: 4 G/DL (ref 3.4–5)
ALP BLD-CCNC: 78 U/L (ref 40–129)
ALT SERPL-CCNC: 13 U/L (ref 10–40)
ANION GAP SERPL CALCULATED.3IONS-SCNC: 6 MMOL/L (ref 3–16)
AST SERPL-CCNC: 16 U/L (ref 15–37)
BILIRUB SERPL-MCNC: 0.8 MG/DL (ref 0–1)
BUN BLDV-MCNC: 14 MG/DL (ref 7–20)
CALCIUM SERPL-MCNC: 9.5 MG/DL (ref 8.3–10.6)
CHLORIDE BLD-SCNC: 102 MMOL/L (ref 99–110)
CO2: 29 MMOL/L (ref 21–32)
CREAT SERPL-MCNC: 0.9 MG/DL (ref 0.8–1.3)
GFR AFRICAN AMERICAN: >60
GFR NON-AFRICAN AMERICAN: >60
GLOBULIN: 2.2 G/DL
GLUCOSE BLD-MCNC: 93 MG/DL (ref 70–99)
POTASSIUM SERPL-SCNC: 5.5 MMOL/L (ref 3.5–5.1)
SODIUM BLD-SCNC: 137 MMOL/L (ref 136–145)
TOTAL PROTEIN: 6.2 G/DL (ref 6.4–8.2)

## 2018-08-15 PROCEDURE — 71260 CT THORAX DX C+: CPT

## 2018-08-15 PROCEDURE — 6360000004 HC RX CONTRAST MEDICATION: Performed by: INTERNAL MEDICINE

## 2018-08-15 PROCEDURE — 74177 CT ABD & PELVIS W/CONTRAST: CPT

## 2018-08-15 PROCEDURE — 80053 COMPREHEN METABOLIC PANEL: CPT

## 2018-08-15 PROCEDURE — 36415 COLL VENOUS BLD VENIPUNCTURE: CPT

## 2018-08-15 RX ADMIN — IOHEXOL 50 ML: 240 INJECTION, SOLUTION INTRATHECAL; INTRAVASCULAR; INTRAVENOUS; ORAL at 07:05

## 2018-08-15 RX ADMIN — IOPAMIDOL 100 ML: 755 INJECTION, SOLUTION INTRAVENOUS at 08:22

## 2018-08-21 ENCOUNTER — OFFICE VISIT (OUTPATIENT)
Dept: CARDIOLOGY CLINIC | Age: 73
End: 2018-08-21

## 2018-08-21 VITALS
BODY MASS INDEX: 20.41 KG/M2 | DIASTOLIC BLOOD PRESSURE: 74 MMHG | HEIGHT: 73 IN | SYSTOLIC BLOOD PRESSURE: 116 MMHG | WEIGHT: 154 LBS | HEART RATE: 80 BPM

## 2018-08-21 DIAGNOSIS — I48.3 TYPICAL ATRIAL FLUTTER (HCC): ICD-10-CM

## 2018-08-21 DIAGNOSIS — I48.0 PAF (PAROXYSMAL ATRIAL FIBRILLATION) (HCC): Primary | ICD-10-CM

## 2018-08-21 PROCEDURE — 93000 ELECTROCARDIOGRAM COMPLETE: CPT | Performed by: NURSE PRACTITIONER

## 2018-08-21 PROCEDURE — 1036F TOBACCO NON-USER: CPT | Performed by: NURSE PRACTITIONER

## 2018-08-21 PROCEDURE — 3017F COLORECTAL CA SCREEN DOC REV: CPT | Performed by: NURSE PRACTITIONER

## 2018-08-21 PROCEDURE — G8420 CALC BMI NORM PARAMETERS: HCPCS | Performed by: NURSE PRACTITIONER

## 2018-08-21 PROCEDURE — 99213 OFFICE O/P EST LOW 20 MIN: CPT | Performed by: NURSE PRACTITIONER

## 2018-08-21 PROCEDURE — 1101F PT FALLS ASSESS-DOCD LE1/YR: CPT | Performed by: NURSE PRACTITIONER

## 2018-08-21 PROCEDURE — G8427 DOCREV CUR MEDS BY ELIG CLIN: HCPCS | Performed by: NURSE PRACTITIONER

## 2018-08-21 PROCEDURE — 1123F ACP DISCUSS/DSCN MKR DOCD: CPT | Performed by: NURSE PRACTITIONER

## 2018-08-21 PROCEDURE — 4040F PNEUMOC VAC/ADMIN/RCVD: CPT | Performed by: NURSE PRACTITIONER

## 2018-08-21 RX ORDER — ASPIRIN 81 MG/1
81 TABLET ORAL DAILY
Qty: 30 TABLET | Refills: 5 | COMMUNITY
Start: 2018-08-21 | End: 2019-01-01 | Stop reason: ALTCHOICE

## 2018-10-23 ENCOUNTER — NURSE ONLY (OUTPATIENT)
Dept: FAMILY MEDICINE CLINIC | Age: 73
End: 2018-10-23
Payer: MEDICARE

## 2018-10-23 DIAGNOSIS — Z23 NEED FOR VACCINATION: Primary | ICD-10-CM

## 2018-10-23 PROCEDURE — 90662 IIV NO PRSV INCREASED AG IM: CPT | Performed by: FAMILY MEDICINE

## 2018-10-23 PROCEDURE — G0008 ADMIN INFLUENZA VIRUS VAC: HCPCS | Performed by: FAMILY MEDICINE

## 2018-10-23 NOTE — PROGRESS NOTES
Vaccine Information Sheet, \"Influenza - Inactivated\"  given to Jaimee Henriquez, or parent/legal guardian of  Jaimee Henriquez and verbalized understanding. Patient responses:    Have you ever had a reaction to a flu vaccine? No  Are you able to eat eggs without adverse effects? Yes  Do you have any current illness? No  Have you ever had Guillian Washington Syndrome? No    Flu vaccine given per order. Please see immunization tab.

## 2018-11-26 ENCOUNTER — HOSPITAL ENCOUNTER (OUTPATIENT)
Dept: GENERAL RADIOLOGY | Age: 73
Discharge: HOME | End: 2018-11-26
Payer: MEDICARE

## 2018-11-26 DIAGNOSIS — C83.10 MANTLE CELL LYMPHOMA, UNSPECIFIED BODY REGION (HCC): ICD-10-CM

## 2018-11-26 PROCEDURE — 71046 X-RAY EXAM CHEST 2 VIEWS: CPT

## 2019-01-01 ENCOUNTER — HOSPITAL ENCOUNTER (OUTPATIENT)
Dept: CT IMAGING | Age: 74
Discharge: HOME | End: 2019-08-29
Payer: MEDICARE

## 2019-01-01 ENCOUNTER — OFFICE VISIT (OUTPATIENT)
Dept: CARDIOLOGY CLINIC | Age: 74
End: 2019-01-01
Payer: MEDICARE

## 2019-01-01 ENCOUNTER — OFFICE VISIT (OUTPATIENT)
Dept: PULMONOLOGY | Age: 74
End: 2019-01-01
Payer: MEDICARE

## 2019-01-01 ENCOUNTER — IMMUNIZATION (OUTPATIENT)
Dept: FAMILY MEDICINE CLINIC | Age: 74
End: 2019-01-01
Payer: MEDICARE

## 2019-01-01 ENCOUNTER — HOSPITAL ENCOUNTER (OUTPATIENT)
Dept: CT IMAGING | Age: 74
Discharge: HOME | End: 2019-10-09
Payer: MEDICARE

## 2019-01-01 ENCOUNTER — HOSPITAL ENCOUNTER (OUTPATIENT)
Dept: CT IMAGING | Age: 74
Discharge: HOME | End: 2019-10-23
Payer: MEDICARE

## 2019-01-01 ENCOUNTER — HOSPITAL ENCOUNTER (OUTPATIENT)
Dept: CT IMAGING | Age: 74
Discharge: HOME | End: 2019-12-19
Payer: MEDICARE

## 2019-01-01 ENCOUNTER — INITIAL CONSULT (OUTPATIENT)
Dept: SURGERY | Age: 74
End: 2019-01-01
Payer: MEDICARE

## 2019-01-01 ENCOUNTER — TELEPHONE (OUTPATIENT)
Dept: SURGERY | Age: 74
End: 2019-01-01

## 2019-01-01 ENCOUNTER — HOSPITAL ENCOUNTER (EMERGENCY)
Age: 74
Discharge: HOME | End: 2019-08-29
Attending: EMERGENCY MEDICINE
Payer: MEDICARE

## 2019-01-01 ENCOUNTER — HOSPITAL ENCOUNTER (OUTPATIENT)
Dept: GENERAL RADIOLOGY | Age: 74
Discharge: HOME | End: 2019-08-16
Payer: MEDICARE

## 2019-01-01 ENCOUNTER — OFFICE VISIT (OUTPATIENT)
Dept: CARDIOTHORACIC SURGERY | Age: 74
End: 2019-01-01
Payer: MEDICARE

## 2019-01-01 VITALS
SYSTOLIC BLOOD PRESSURE: 110 MMHG | HEIGHT: 73 IN | HEART RATE: 75 BPM | OXYGEN SATURATION: 97 % | WEIGHT: 151 LBS | DIASTOLIC BLOOD PRESSURE: 60 MMHG | BODY MASS INDEX: 20.01 KG/M2

## 2019-01-01 VITALS
SYSTOLIC BLOOD PRESSURE: 128 MMHG | BODY MASS INDEX: 19.13 KG/M2 | HEART RATE: 68 BPM | WEIGHT: 145 LBS | TEMPERATURE: 98 F | RESPIRATION RATE: 14 BRPM | DIASTOLIC BLOOD PRESSURE: 74 MMHG | OXYGEN SATURATION: 98 %

## 2019-01-01 VITALS
HEART RATE: 96 BPM | SYSTOLIC BLOOD PRESSURE: 110 MMHG | BODY MASS INDEX: 19.09 KG/M2 | HEIGHT: 73 IN | WEIGHT: 144 LBS | OXYGEN SATURATION: 96 % | DIASTOLIC BLOOD PRESSURE: 68 MMHG

## 2019-01-01 VITALS
HEART RATE: 71 BPM | SYSTOLIC BLOOD PRESSURE: 120 MMHG | RESPIRATION RATE: 16 BRPM | WEIGHT: 145 LBS | HEIGHT: 73 IN | OXYGEN SATURATION: 98 % | BODY MASS INDEX: 19.22 KG/M2 | DIASTOLIC BLOOD PRESSURE: 70 MMHG

## 2019-01-01 VITALS
HEART RATE: 81 BPM | SYSTOLIC BLOOD PRESSURE: 120 MMHG | HEIGHT: 73 IN | BODY MASS INDEX: 19.69 KG/M2 | WEIGHT: 148.6 LBS | DIASTOLIC BLOOD PRESSURE: 66 MMHG | OXYGEN SATURATION: 95 %

## 2019-01-01 DIAGNOSIS — Z00.6 EXAMINATION OF PARTICIPANT IN CLINICAL TRIAL: ICD-10-CM

## 2019-01-01 DIAGNOSIS — Z23 NEED FOR INFLUENZA VACCINATION: Primary | ICD-10-CM

## 2019-01-01 DIAGNOSIS — J93.83 SPONTANEOUS PNEUMOTHORAX: Primary | ICD-10-CM

## 2019-01-01 DIAGNOSIS — Z72.0 TOBACCO ABUSE DISORDER: ICD-10-CM

## 2019-01-01 DIAGNOSIS — C83.18 MANTLE CELL LYMPHOMA OF LYMPH NODES OF MULTIPLE SITES (HCC): ICD-10-CM

## 2019-01-01 DIAGNOSIS — Z00.6 RESEARCH STUDY PATIENT: ICD-10-CM

## 2019-01-01 DIAGNOSIS — I48.0 PAF (PAROXYSMAL ATRIAL FIBRILLATION) (HCC): ICD-10-CM

## 2019-01-01 DIAGNOSIS — J93.11 PRIMARY SPONTANEOUS PNEUMOTHORAX: ICD-10-CM

## 2019-01-01 DIAGNOSIS — C83.18 MANTLE CELL LYMPHOMA OF LYMPH NODES OF MULTIPLE SITES (HCC): Primary | ICD-10-CM

## 2019-01-01 DIAGNOSIS — R06.02 SOB (SHORTNESS OF BREATH): ICD-10-CM

## 2019-01-01 DIAGNOSIS — I48.3 TYPICAL ATRIAL FLUTTER (HCC): ICD-10-CM

## 2019-01-01 DIAGNOSIS — R19.04 LEFT LOWER QUADRANT ABDOMINAL MASS: Primary | ICD-10-CM

## 2019-01-01 DIAGNOSIS — I48.0 PAF (PAROXYSMAL ATRIAL FIBRILLATION) (HCC): Primary | ICD-10-CM

## 2019-01-01 DIAGNOSIS — Z72.0 TOBACCO ABUSE DISORDER: Primary | ICD-10-CM

## 2019-01-01 DIAGNOSIS — R05.9 COUGH: ICD-10-CM

## 2019-01-01 DIAGNOSIS — J44.9 CHRONIC OBSTRUCTIVE PULMONARY DISEASE, UNSPECIFIED COPD TYPE (HCC): ICD-10-CM

## 2019-01-01 LAB
A/G RATIO: 1.3 (ref 1.1–2.2)
ABO/RH: NORMAL
ALBUMIN SERPL-MCNC: 2.6 G/DL (ref 3.4–5)
ALP BLD-CCNC: 67 U/L (ref 40–129)
ALT SERPL-CCNC: 7 U/L (ref 10–40)
ANION GAP SERPL CALCULATED.3IONS-SCNC: 8 MMOL/L (ref 3–16)
ANTIBODY SCREEN: NORMAL
AST SERPL-CCNC: 10 U/L (ref 15–37)
BASOPHILS ABSOLUTE: 0.1 K/UL (ref 0–0.2)
BASOPHILS RELATIVE PERCENT: 1.1 %
BILIRUB SERPL-MCNC: 0.6 MG/DL (ref 0–1)
BLOOD CULTURE, ROUTINE: NORMAL
BUN BLDV-MCNC: 15 MG/DL (ref 7–20)
CALCIUM SERPL-MCNC: 6.9 MG/DL (ref 8.3–10.6)
CHLORIDE BLD-SCNC: 110 MMOL/L (ref 99–110)
CO2: 20 MMOL/L (ref 21–32)
CREAT SERPL-MCNC: 0.7 MG/DL (ref 0.8–1.3)
CULTURE, BLOOD 2: NORMAL
EOSINOPHILS ABSOLUTE: 0.1 K/UL (ref 0–0.6)
EOSINOPHILS RELATIVE PERCENT: 0.8 %
GFR AFRICAN AMERICAN: >60
GFR NON-AFRICAN AMERICAN: >60
GLOBULIN: 2 G/DL
GLUCOSE BLD-MCNC: 62 MG/DL (ref 70–99)
HCT VFR BLD CALC: 35.3 % (ref 40.5–52.5)
HEMOGLOBIN: 12 G/DL (ref 13.5–17.5)
LACTIC ACID: 0.8 MMOL/L (ref 0.4–2)
LIPASE: 10 U/L (ref 13–60)
LYMPHOCYTES ABSOLUTE: 2.4 K/UL (ref 1–5.1)
LYMPHOCYTES RELATIVE PERCENT: 21.7 %
MCH RBC QN AUTO: 32.3 PG (ref 26–34)
MCHC RBC AUTO-ENTMCNC: 34.1 G/DL (ref 31–36)
MCV RBC AUTO: 94.7 FL (ref 80–100)
MONOCYTES ABSOLUTE: 1.2 K/UL (ref 0–1.3)
MONOCYTES RELATIVE PERCENT: 10.7 %
NEUTROPHILS ABSOLUTE: 7.2 K/UL (ref 1.7–7.7)
NEUTROPHILS RELATIVE PERCENT: 65.7 %
PDW BLD-RTO: 15.8 % (ref 12.4–15.4)
PLATELET # BLD: 179 K/UL (ref 135–450)
PMV BLD AUTO: 9.1 FL (ref 5–10.5)
POTASSIUM SERPL-SCNC: 3.1 MMOL/L (ref 3.5–5.1)
RBC # BLD: 3.72 M/UL (ref 4.2–5.9)
SODIUM BLD-SCNC: 138 MMOL/L (ref 136–145)
TOTAL PROTEIN: 4.6 G/DL (ref 6.4–8.2)
WBC # BLD: 11 K/UL (ref 4–11)

## 2019-01-01 PROCEDURE — 1123F ACP DISCUSS/DSCN MKR DOCD: CPT | Performed by: THORACIC SURGERY (CARDIOTHORACIC VASCULAR SURGERY)

## 2019-01-01 PROCEDURE — 3017F COLORECTAL CA SCREEN DOC REV: CPT | Performed by: INTERNAL MEDICINE

## 2019-01-01 PROCEDURE — G0008 ADMIN INFLUENZA VIRUS VAC: HCPCS | Performed by: FAMILY MEDICINE

## 2019-01-01 PROCEDURE — 80053 COMPREHEN METABOLIC PANEL: CPT

## 2019-01-01 PROCEDURE — 87040 BLOOD CULTURE FOR BACTERIA: CPT

## 2019-01-01 PROCEDURE — 71046 X-RAY EXAM CHEST 2 VIEWS: CPT

## 2019-01-01 PROCEDURE — 83690 ASSAY OF LIPASE: CPT

## 2019-01-01 PROCEDURE — 3023F SPIROM DOC REV: CPT | Performed by: INTERNAL MEDICINE

## 2019-01-01 PROCEDURE — 6360000004 HC RX CONTRAST MEDICATION: Performed by: SURGERY

## 2019-01-01 PROCEDURE — G8427 DOCREV CUR MEDS BY ELIG CLIN: HCPCS | Performed by: THORACIC SURGERY (CARDIOTHORACIC VASCULAR SURGERY)

## 2019-01-01 PROCEDURE — 1123F ACP DISCUSS/DSCN MKR DOCD: CPT | Performed by: INTERNAL MEDICINE

## 2019-01-01 PROCEDURE — G8420 CALC BMI NORM PARAMETERS: HCPCS | Performed by: NURSE PRACTITIONER

## 2019-01-01 PROCEDURE — G8420 CALC BMI NORM PARAMETERS: HCPCS | Performed by: SURGERY

## 2019-01-01 PROCEDURE — 99213 OFFICE O/P EST LOW 20 MIN: CPT | Performed by: NURSE PRACTITIONER

## 2019-01-01 PROCEDURE — 6370000000 HC RX 637 (ALT 250 FOR IP): Performed by: EMERGENCY MEDICINE

## 2019-01-01 PROCEDURE — 74177 CT ABD & PELVIS W/CONTRAST: CPT

## 2019-01-01 PROCEDURE — 93000 ELECTROCARDIOGRAM COMPLETE: CPT | Performed by: NURSE PRACTITIONER

## 2019-01-01 PROCEDURE — 1036F TOBACCO NON-USER: CPT | Performed by: THORACIC SURGERY (CARDIOTHORACIC VASCULAR SURGERY)

## 2019-01-01 PROCEDURE — 3017F COLORECTAL CA SCREEN DOC REV: CPT | Performed by: THORACIC SURGERY (CARDIOTHORACIC VASCULAR SURGERY)

## 2019-01-01 PROCEDURE — G8427 DOCREV CUR MEDS BY ELIG CLIN: HCPCS | Performed by: INTERNAL MEDICINE

## 2019-01-01 PROCEDURE — 6360000004 HC RX CONTRAST MEDICATION: Performed by: INTERNAL MEDICINE

## 2019-01-01 PROCEDURE — 85025 COMPLETE CBC W/AUTO DIFF WBC: CPT

## 2019-01-01 PROCEDURE — 1036F TOBACCO NON-USER: CPT | Performed by: INTERNAL MEDICINE

## 2019-01-01 PROCEDURE — 90653 IIV ADJUVANT VACCINE IM: CPT | Performed by: FAMILY MEDICINE

## 2019-01-01 PROCEDURE — 99213 OFFICE O/P EST LOW 20 MIN: CPT | Performed by: SURGERY

## 2019-01-01 PROCEDURE — 4040F PNEUMOC VAC/ADMIN/RCVD: CPT | Performed by: INTERNAL MEDICINE

## 2019-01-01 PROCEDURE — 4040F PNEUMOC VAC/ADMIN/RCVD: CPT | Performed by: NURSE PRACTITIONER

## 2019-01-01 PROCEDURE — 71260 CT THORAX DX C+: CPT

## 2019-01-01 PROCEDURE — 83605 ASSAY OF LACTIC ACID: CPT

## 2019-01-01 PROCEDURE — 3017F COLORECTAL CA SCREEN DOC REV: CPT | Performed by: NURSE PRACTITIONER

## 2019-01-01 PROCEDURE — 1123F ACP DISCUSS/DSCN MKR DOCD: CPT | Performed by: NURSE PRACTITIONER

## 2019-01-01 PROCEDURE — 86850 RBC ANTIBODY SCREEN: CPT

## 2019-01-01 PROCEDURE — G8420 CALC BMI NORM PARAMETERS: HCPCS | Performed by: THORACIC SURGERY (CARDIOTHORACIC VASCULAR SURGERY)

## 2019-01-01 PROCEDURE — 70491 CT SOFT TISSUE NECK W/DYE: CPT

## 2019-01-01 PROCEDURE — G8420 CALC BMI NORM PARAMETERS: HCPCS | Performed by: INTERNAL MEDICINE

## 2019-01-01 PROCEDURE — 1036F TOBACCO NON-USER: CPT | Performed by: NURSE PRACTITIONER

## 2019-01-01 PROCEDURE — 4040F PNEUMOC VAC/ADMIN/RCVD: CPT | Performed by: THORACIC SURGERY (CARDIOTHORACIC VASCULAR SURGERY)

## 2019-01-01 PROCEDURE — 99214 OFFICE O/P EST MOD 30 MIN: CPT | Performed by: INTERNAL MEDICINE

## 2019-01-01 PROCEDURE — 99213 OFFICE O/P EST LOW 20 MIN: CPT | Performed by: THORACIC SURGERY (CARDIOTHORACIC VASCULAR SURGERY)

## 2019-01-01 PROCEDURE — 86900 BLOOD TYPING SEROLOGIC ABO: CPT

## 2019-01-01 PROCEDURE — G8427 DOCREV CUR MEDS BY ELIG CLIN: HCPCS | Performed by: NURSE PRACTITIONER

## 2019-01-01 PROCEDURE — 99283 EMERGENCY DEPT VISIT LOW MDM: CPT

## 2019-01-01 PROCEDURE — G8926 SPIRO NO PERF OR DOC: HCPCS | Performed by: INTERNAL MEDICINE

## 2019-01-01 PROCEDURE — G8427 DOCREV CUR MEDS BY ELIG CLIN: HCPCS | Performed by: SURGERY

## 2019-01-01 PROCEDURE — 86901 BLOOD TYPING SEROLOGIC RH(D): CPT

## 2019-01-01 RX ORDER — POTASSIUM CHLORIDE 20 MEQ/1
40 TABLET, EXTENDED RELEASE ORAL ONCE
Status: COMPLETED | OUTPATIENT
Start: 2019-01-01 | End: 2019-01-01

## 2019-01-01 RX ORDER — ALBUTEROL SULFATE 90 UG/1
2 AEROSOL, METERED RESPIRATORY (INHALATION) EVERY 6 HOURS PRN
Qty: 1 INHALER | Refills: 5 | Status: SHIPPED | OUTPATIENT
Start: 2019-01-01

## 2019-01-01 RX ORDER — AMOXICILLIN AND CLAVULANATE POTASSIUM 875; 125 MG/1; MG/1
1 TABLET, FILM COATED ORAL 2 TIMES DAILY
Qty: 20 TABLET | Refills: 0 | Status: SHIPPED | OUTPATIENT
Start: 2019-01-01 | End: 2019-01-01

## 2019-01-01 RX ORDER — AMOXICILLIN AND CLAVULANATE POTASSIUM 875; 125 MG/1; MG/1
1 TABLET, FILM COATED ORAL ONCE
Status: COMPLETED | OUTPATIENT
Start: 2019-01-01 | End: 2019-01-01

## 2019-01-01 RX ORDER — MEGESTROL ACETATE 40 MG/1
200 TABLET ORAL 2 TIMES DAILY
COMMUNITY

## 2019-01-01 RX ORDER — DILTIAZEM HYDROCHLORIDE 120 MG/1
120 CAPSULE, COATED, EXTENDED RELEASE ORAL DAILY
Qty: 90 CAPSULE | Refills: 3 | Status: SHIPPED | OUTPATIENT
Start: 2019-01-01

## 2019-01-01 RX ADMIN — IOPAMIDOL 75 ML: 755 INJECTION, SOLUTION INTRAVENOUS at 12:40

## 2019-01-01 RX ADMIN — IOHEXOL 50 ML: 240 INJECTION, SOLUTION INTRATHECAL; INTRAVASCULAR; INTRAVENOUS; ORAL at 10:29

## 2019-01-01 RX ADMIN — POTASSIUM CHLORIDE 40 MEQ: 20 TABLET, EXTENDED RELEASE ORAL at 15:51

## 2019-01-01 RX ADMIN — IOPAMIDOL 150 ML: 755 INJECTION, SOLUTION INTRAVENOUS at 10:29

## 2019-01-01 RX ADMIN — IOPAMIDOL 75 ML: 755 INJECTION, SOLUTION INTRAVENOUS at 10:24

## 2019-01-01 RX ADMIN — AMOXICILLIN AND CLAVULANATE POTASSIUM 1 TABLET: 875; 125 TABLET, FILM COATED ORAL at 15:51

## 2019-01-01 RX ADMIN — IOHEXOL 50 ML: 240 INJECTION, SOLUTION INTRATHECAL; INTRAVASCULAR; INTRAVENOUS; ORAL at 10:23

## 2019-01-01 RX ADMIN — IOHEXOL 50 ML: 240 INJECTION, SOLUTION INTRATHECAL; INTRAVASCULAR; INTRAVENOUS; ORAL at 12:41

## 2019-01-01 RX ADMIN — IOPAMIDOL 150 ML: 755 INJECTION, SOLUTION INTRAVENOUS at 13:59

## 2019-01-01 RX ADMIN — IOHEXOL 50 ML: 240 INJECTION, SOLUTION INTRATHECAL; INTRAVASCULAR; INTRAVENOUS; ORAL at 13:59

## 2019-01-01 ASSESSMENT — ENCOUNTER SYMPTOMS
VOMITING: 0
NAUSEA: 0
COUGH: 0
DIARRHEA: 0
SHORTNESS OF BREATH: 1
ABDOMINAL PAIN: 0
CONSTIPATION: 0
SORE THROAT: 0
BACK PAIN: 0

## 2019-01-01 ASSESSMENT — PAIN SCALES - GENERAL: PAINLEVEL_OUTOF10: 0

## 2019-02-18 ENCOUNTER — HOSPITAL ENCOUNTER (OUTPATIENT)
Age: 74
Discharge: HOME | End: 2019-02-18
Payer: MEDICARE

## 2019-02-18 ENCOUNTER — HOSPITAL ENCOUNTER (OUTPATIENT)
Dept: CT IMAGING | Age: 74
Discharge: HOME | End: 2019-02-18
Payer: MEDICARE

## 2019-02-18 DIAGNOSIS — C83.18 LYMPHOMA, MANTLE CELL, MULTIPLE SITES (HCC): ICD-10-CM

## 2019-02-18 LAB
BUN BLDV-MCNC: 18 MG/DL (ref 7–20)
CREAT SERPL-MCNC: 0.9 MG/DL (ref 0.8–1.3)
GFR AFRICAN AMERICAN: >60
GFR NON-AFRICAN AMERICAN: >60

## 2019-02-18 PROCEDURE — 36415 COLL VENOUS BLD VENIPUNCTURE: CPT

## 2019-02-18 PROCEDURE — 82565 ASSAY OF CREATININE: CPT

## 2019-02-18 PROCEDURE — 84520 ASSAY OF UREA NITROGEN: CPT

## 2019-02-18 PROCEDURE — 6360000004 HC RX CONTRAST MEDICATION: Performed by: INTERNAL MEDICINE

## 2019-02-18 PROCEDURE — 74177 CT ABD & PELVIS W/CONTRAST: CPT

## 2019-02-18 RX ADMIN — IOPAMIDOL 75 ML: 755 INJECTION, SOLUTION INTRAVENOUS at 13:41

## 2019-02-18 RX ADMIN — IOHEXOL 50 ML: 240 INJECTION, SOLUTION INTRATHECAL; INTRAVASCULAR; INTRAVENOUS; ORAL at 13:41

## 2019-07-23 ENCOUNTER — HOSPITAL ENCOUNTER (OUTPATIENT)
Dept: CT IMAGING | Age: 74
Discharge: HOME | End: 2019-07-23
Payer: MEDICARE

## 2019-07-23 DIAGNOSIS — R07.1 CHEST PAIN ON BREATHING: ICD-10-CM

## 2019-07-23 PROCEDURE — 6360000004 HC RX CONTRAST MEDICATION: Performed by: INTERNAL MEDICINE

## 2019-07-23 PROCEDURE — 71260 CT THORAX DX C+: CPT

## 2019-07-23 RX ADMIN — IOPAMIDOL 75 ML: 755 INJECTION, SOLUTION INTRAVENOUS at 16:15

## 2019-07-24 ENCOUNTER — TELEPHONE (OUTPATIENT)
Dept: CARDIOTHORACIC SURGERY | Age: 74
End: 2019-07-24

## 2019-07-24 DIAGNOSIS — J93.11 PRIMARY SPONTANEOUS PNEUMOTHORAX: Primary | ICD-10-CM

## 2019-07-29 DIAGNOSIS — I48.0 PAF (PAROXYSMAL ATRIAL FIBRILLATION) (HCC): Primary | ICD-10-CM

## 2019-07-30 RX ORDER — DILTIAZEM HYDROCHLORIDE 120 MG/1
120 CAPSULE, COATED, EXTENDED RELEASE ORAL DAILY
Qty: 90 CAPSULE | Refills: 0 | Status: SHIPPED | OUTPATIENT
Start: 2019-07-30 | End: 2019-01-01 | Stop reason: SDUPTHER

## 2019-09-04 NOTE — ED PROVIDER NOTES
signs stable and within normal limits. Physical exam as documented above. Differential includes abscess, necrotic lymph node. Lab work obtained and notable for white blood cell count within normal limits, hypokalemia. Reviewed patient's CT scan which shows a new mass in his left lower quadrant next to the sigmoid colon which was concerning for abscess due to diverticulitis but patient does not have any symptoms of diverticulitis. Called and spoke with surgeon on-call Dr. Chepe Barroso who recommended starting patient on Augmentin and having him follow-up in clinic with surgery and also recommended follow-up with GI for colonoscopy. Updated patient and family member on plan of care and they are amenable with this plan. Patient given first dose of antibiotics and discharged home. CONSULTS:  IP CONSULT TO GENERAL SURGERY    PROCEDURES:  Unless otherwise noted below, none     Procedures      FINAL IMPRESSION      1. Left lower quadrant abdominal mass          DISPOSITION/PLAN   DISPOSITION Decision To Discharge 08/29/2019 03:59:14 PM      PATIENT REFERRED TO:  Rina Gee MD  [address] [place] [address] [place]  [phone]    Schedule an appointment as soon as possible for a visit   to discuss your CT findings    Ruben Lowe MD  [address] [facility], [address] [place] [address] [phone]    Schedule an appointment as soon as possible for a visit   for colonoscopy      DISCHARGE MEDICATIONS:  Discharge Medication List as of 8/29/2019  4:02 PM      START taking these medications    Details   amoxicillin-clavulanate (AUGMENTIN) 875-125 MG per tablet Take 1 tablet by mouth 2 times daily for 10 days, Disp-20 tablet, R-0Print           Controlled Substances Monitoring:     No flowsheet data found.     (Please note that portions of this note were completed with a voice recognition program.  Efforts were made to edit the dictations but occasionally words are mis-transcribed.)    Michele
(V4) confused

## 2019-09-05 NOTE — PROGRESS NOTES
ibrutinib (IMBRUVICA) 140 MG chemo capsule, Take 4 capsules by mouth daily Indications: 4 capsules daily, Disp: 120 capsule, Rfl: 3    albuterol sulfate HFA (PROAIR HFA) 108 (90 BASE) MCG/ACT inhaler, Inhale 2 puffs into the lungs every 6 hours as needed for Wheezing or Shortness of Breath, Disp: 1 Inhaler, Rfl: 3    Multiple Vitamins-Iron (MULTIVITAMIN/IRON PO), Take 1 tablet by mouth daily , Disp: , Rfl:     Objective:   PHYSICAL EXAM:    VITALS:  /70   Pulse 71   Resp 16   Ht 6' 1\" (1.854 m)   Wt 145 lb (65.8 kg)   SpO2 98%   BMI 19.13 kg/m²   Constitutional: In no acute distress. Appears stated age. Well developed and nourished  Eyes: PERRL. No sclera icterus. No conjunctival injection. ENT: Oropharynx clear. Neck: Trachea midline. No thyroid tenderness. Lymph: No cervical LAD. No supraclavicular LAD. Resp: No accessory muscle use. No crackles. No wheezes. No rhonchi. CV: Regular rate. Regular rhythm. No murmur or rub. No lower extremity edema. Skin: Warm and dry. No nodules on exposed extremities. No rash on exposed extremities. Musc: No clubbing. No cyanosis. No synovitis or joint deformity in digits. Psych: Oriented x 3. Mood and affect normal. Intact judgment and insight. LABS:  Reviewed any pertinent new labs that are available. PFTs   5/9/13    FVC (77%) FEV1 2.0L (66%) FEV1/FVC ratio 53 TLC (%) RV (139%) DLCO (%) Bronchodilator response:   8/18/15:  FVC (71%) FEV1 1.8L (47%) FEV1/FVC ratio 49 TLC (122%) RV (224%) DLCO (76%) Bronchodilator response: + 6MWT: 1100ft, 95%    IMAGING: I personally reviewed and interpreted the following imaging today in the office:   10/10/16 Chest CT: mild emphysema.        ASSESSMENT:   COPD, severe  History of Mantle cell lymphoma, original diagnosis 12/2012, status post R-CHOP x6, status post autologous stem cell transplant, 06/13/13, with biopsy-proven relapsed disease 09/26/14, receiving Imbruvica, started 10/4/14   Left Vats Pleurodesis

## 2019-09-16 NOTE — LETTER
Constitutional and general appearance: alert, cooperative, no distress and appears stated age  [de-identified]: PERRL, no cervical lymphadenopathy. No masses palpable. Normal oral mucosa  Respiratory:  · Normal excursion and expansion without use of accessory muscles  · Resp auscultation: Normal breath sounds without dullness or wheezing  Cardiovascular:  · The apical impulse is not displaced  · Heart tones are crisp and normal. Regular S1 and S2.  · Jugular venous pulsation Normal  · The carotid upstroke is normal in amplitude and contour without delay or bruit  · Peripheral pulses are symmetrical and full   Abdomen:  · No masses or tenderness  · Bowel sounds present  Extremities:  ·  No cyanosis or clubbing  ·  No lower extremity edema  ·  Skin: warm and dry; scatt ecchymosis to arms  Neurological:  · Alert and oriented  · Moves all extremities well  · No abnormalities of mood, affect, memory, mentation, or behavior are noted    DATA:    ECG 9/16/2019:  SR HR 68    Echo 1/67/5314:  LV systolic function is mildly reduced with an estimated EF of 45-50%. There is mild global hypokinesis. Grade I diastolic dysfunction with normal filling pressure. CARDIOLOGY LABS:   CBC: No results for input(s): WBC, HGB, HCT, PLT in the last 72 hours. BMP: No results for input(s): NA, K, CO2, BUN, CREATININE, LABGLOM, GLUCOSE in the last 72 hours. PT/INR: No results for input(s): PROTIME, INR in the last 72 hours. APTT:No results for input(s): APTT in the last 72 hours. FASTING LIPID PANEL:  Lab Results   Component Value Date    HDL 42 06/17/2017    HDL 34 11/30/2011    LDLCALC 103 06/17/2017    TRIG 61 06/17/2017     LIVER PROFILE:No results for input(s): AST, ALT, ALB in the last 72 hours. Assessment:   1. Paroxysmal atrial fibrillation and atrial flutter: stable   -noted during hospitalization in 1/2018 (no known recurrence)    -[id number] score 1 (age)  2.  Sinus bradycardia: resolved   -noted in hospital 3. Pneumothorax: s/p VATS, wedge resection, and pleurodesis on 1/8/2018  4. History of lymphoma   5. COPD  6. Tobacco abuse: quit smoking in 12/2017    Plan:   1. Continue Cardizem   2. OK to stop ASA (no indication)  3.  Follow up in one year    Dana Baird, [address]  [phone]

## 2020-01-01 ENCOUNTER — APPOINTMENT (OUTPATIENT)
Dept: GENERAL RADIOLOGY | Age: 75
DRG: 329 | End: 2020-01-01
Payer: MEDICARE

## 2020-01-01 ENCOUNTER — HOSPITAL ENCOUNTER (OUTPATIENT)
Dept: CT IMAGING | Age: 75
Discharge: HOME | End: 2020-06-08
Payer: MEDICARE

## 2020-01-01 ENCOUNTER — ANESTHESIA EVENT (OUTPATIENT)
Dept: OPERATING ROOM | Age: 75
DRG: 329 | End: 2020-01-01
Payer: MEDICARE

## 2020-01-01 ENCOUNTER — HOSPITAL ENCOUNTER (OUTPATIENT)
Dept: CT IMAGING | Age: 75
Discharge: HOME | End: 2020-04-13
Payer: MEDICARE

## 2020-01-01 ENCOUNTER — CARE COORDINATION (OUTPATIENT)
Dept: CASE MANAGEMENT | Age: 75
End: 2020-01-01

## 2020-01-01 ENCOUNTER — TELEPHONE (OUTPATIENT)
Dept: OTHER | Facility: CLINIC | Age: 75
End: 2020-01-01

## 2020-01-01 ENCOUNTER — APPOINTMENT (OUTPATIENT)
Dept: CT IMAGING | Age: 75
DRG: 329 | End: 2020-01-01
Payer: MEDICARE

## 2020-01-01 ENCOUNTER — HOSPITAL ENCOUNTER (OUTPATIENT)
Dept: CT IMAGING | Age: 75
Discharge: HOME | End: 2020-02-13
Payer: MEDICARE

## 2020-01-01 ENCOUNTER — APPOINTMENT (OUTPATIENT)
Dept: CT IMAGING | Age: 75
End: 2020-01-01
Payer: MEDICARE

## 2020-01-01 ENCOUNTER — HOSPITAL ENCOUNTER (OUTPATIENT)
Dept: VASCULAR LAB | Age: 75
Discharge: HOME | End: 2020-07-29
Payer: MEDICARE

## 2020-01-01 ENCOUNTER — OFFICE VISIT (OUTPATIENT)
Dept: ORTHOPEDIC SURGERY | Age: 75
End: 2020-01-01
Payer: MEDICARE

## 2020-01-01 ENCOUNTER — APPOINTMENT (OUTPATIENT)
Dept: GENERAL RADIOLOGY | Age: 75
DRG: 607 | End: 2020-01-01
Payer: COMMERCIAL

## 2020-01-01 ENCOUNTER — HOSPITAL ENCOUNTER (INPATIENT)
Age: 75
LOS: 12 days | DRG: 329 | End: 2020-08-16
Attending: EMERGENCY MEDICINE | Admitting: INTERNAL MEDICINE
Payer: MEDICARE

## 2020-01-01 ENCOUNTER — HOSPITAL ENCOUNTER (OUTPATIENT)
Dept: WOMENS IMAGING | Age: 75
Discharge: HOME | End: 2020-03-09
Payer: MEDICARE

## 2020-01-01 ENCOUNTER — HOSPITAL ENCOUNTER (OUTPATIENT)
Dept: CT IMAGING | Age: 75
Discharge: HOME | End: 2020-07-31
Payer: MEDICARE

## 2020-01-01 ENCOUNTER — HOSPITAL ENCOUNTER (OUTPATIENT)
Dept: VASCULAR LAB | Age: 75
Discharge: HOME | End: 2020-06-12
Payer: MEDICARE

## 2020-01-01 ENCOUNTER — TELEPHONE (OUTPATIENT)
Dept: ORTHOPEDIC SURGERY | Age: 75
End: 2020-01-01

## 2020-01-01 ENCOUNTER — HOSPITAL ENCOUNTER (OUTPATIENT)
Dept: MRI IMAGING | Age: 75
Discharge: HOME | End: 2020-01-23
Payer: MEDICARE

## 2020-01-01 ENCOUNTER — ANESTHESIA (OUTPATIENT)
Dept: OPERATING ROOM | Age: 75
DRG: 329 | End: 2020-01-01
Payer: MEDICARE

## 2020-01-01 ENCOUNTER — APPOINTMENT (OUTPATIENT)
Dept: INTERVENTIONAL RADIOLOGY/VASCULAR | Age: 75
DRG: 329 | End: 2020-01-01
Payer: MEDICARE

## 2020-01-01 ENCOUNTER — HOSPITAL ENCOUNTER (INPATIENT)
Age: 75
LOS: 6 days | Discharge: HOME | DRG: 607 | End: 2020-07-20
Attending: EMERGENCY MEDICINE | Admitting: INTERNAL MEDICINE
Payer: COMMERCIAL

## 2020-01-01 ENCOUNTER — OFFICE VISIT (OUTPATIENT)
Dept: PULMONOLOGY | Age: 75
End: 2020-01-01
Payer: MEDICARE

## 2020-01-01 VITALS
WEIGHT: 154.8 LBS | BODY MASS INDEX: 20.52 KG/M2 | HEART RATE: 87 BPM | OXYGEN SATURATION: 94 % | HEIGHT: 73 IN | RESPIRATION RATE: 19 BRPM | SYSTOLIC BLOOD PRESSURE: 118 MMHG | DIASTOLIC BLOOD PRESSURE: 70 MMHG

## 2020-01-01 VITALS
SYSTOLIC BLOOD PRESSURE: 88 MMHG | HEART RATE: 100 BPM | BODY MASS INDEX: 20.01 KG/M2 | HEIGHT: 73 IN | WEIGHT: 151.01 LBS | DIASTOLIC BLOOD PRESSURE: 50 MMHG

## 2020-01-01 VITALS
DIASTOLIC BLOOD PRESSURE: 69 MMHG | SYSTOLIC BLOOD PRESSURE: 111 MMHG | OXYGEN SATURATION: 78 % | TEMPERATURE: 84.7 F | HEIGHT: 73 IN | WEIGHT: 158.51 LBS | RESPIRATION RATE: 9 BRPM | BODY MASS INDEX: 21.01 KG/M2

## 2020-01-01 VITALS
BODY MASS INDEX: 20.01 KG/M2 | WEIGHT: 151.01 LBS | HEART RATE: 102 BPM | DIASTOLIC BLOOD PRESSURE: 68 MMHG | HEIGHT: 73 IN | SYSTOLIC BLOOD PRESSURE: 115 MMHG

## 2020-01-01 VITALS
BODY MASS INDEX: 19.9 KG/M2 | RESPIRATION RATE: 18 BRPM | OXYGEN SATURATION: 94 % | TEMPERATURE: 98.4 F | SYSTOLIC BLOOD PRESSURE: 136 MMHG | HEART RATE: 92 BPM | HEIGHT: 72 IN | WEIGHT: 146.9 LBS | DIASTOLIC BLOOD PRESSURE: 73 MMHG

## 2020-01-01 VITALS
OXYGEN SATURATION: 97 % | DIASTOLIC BLOOD PRESSURE: 49 MMHG | TEMPERATURE: 92.6 F | SYSTOLIC BLOOD PRESSURE: 92 MMHG | RESPIRATION RATE: 10 BRPM

## 2020-01-01 LAB
A/G RATIO: 0.9 (ref 1.1–2.2)
A/G RATIO: 1.4 (ref 1.1–2.2)
A/G RATIO: 1.6 (ref 1.1–2.2)
ABO/RH: NORMAL
ADAMTS13 ACTIVITY: 64 %
ALBUMIN SERPL-MCNC: 1.3 G/DL (ref 3.4–5)
ALBUMIN SERPL-MCNC: 2.1 G/DL (ref 3.4–5)
ALBUMIN SERPL-MCNC: 2.3 G/DL (ref 3.4–5)
ALBUMIN SERPL-MCNC: 2.4 G/DL (ref 3.4–5)
ALBUMIN SERPL-MCNC: 2.5 G/DL (ref 3.4–5)
ALBUMIN SERPL-MCNC: 2.5 G/DL (ref 3.4–5)
ALBUMIN SERPL-MCNC: 2.6 G/DL (ref 3.4–5)
ALBUMIN SERPL-MCNC: 2.6 G/DL (ref 3.4–5)
ALBUMIN SERPL-MCNC: 2.7 G/DL (ref 3.4–5)
ALP BLD-CCNC: 36 U/L (ref 40–129)
ALP BLD-CCNC: 69 U/L (ref 40–129)
ALP BLD-CCNC: 80 U/L (ref 40–129)
ALT SERPL-CCNC: 16 U/L (ref 10–40)
ALT SERPL-CCNC: 21 U/L (ref 10–40)
ALT SERPL-CCNC: 22 U/L (ref 10–40)
AMORPHOUS: ABNORMAL /HPF
ANION GAP SERPL CALCULATED.3IONS-SCNC: 10 MMOL/L (ref 3–16)
ANION GAP SERPL CALCULATED.3IONS-SCNC: 11 MMOL/L (ref 3–16)
ANION GAP SERPL CALCULATED.3IONS-SCNC: 11 MMOL/L (ref 3–16)
ANION GAP SERPL CALCULATED.3IONS-SCNC: 12 MMOL/L (ref 3–16)
ANION GAP SERPL CALCULATED.3IONS-SCNC: 12 MMOL/L (ref 3–16)
ANION GAP SERPL CALCULATED.3IONS-SCNC: 13 MMOL/L (ref 3–16)
ANION GAP SERPL CALCULATED.3IONS-SCNC: 15 MMOL/L (ref 3–16)
ANION GAP SERPL CALCULATED.3IONS-SCNC: 5 MMOL/L (ref 3–16)
ANION GAP SERPL CALCULATED.3IONS-SCNC: 5 MMOL/L (ref 3–16)
ANION GAP SERPL CALCULATED.3IONS-SCNC: 7 MMOL/L (ref 3–16)
ANION GAP SERPL CALCULATED.3IONS-SCNC: 8 MMOL/L (ref 3–16)
ANION GAP SERPL CALCULATED.3IONS-SCNC: 9 MMOL/L (ref 3–16)
ANISOCYTOSIS: ABNORMAL
ANTIBODY SCREEN: NORMAL
APTT: 27.2 SEC (ref 24.2–36.2)
AST SERPL-CCNC: 11 U/L (ref 15–37)
AST SERPL-CCNC: 18 U/L (ref 15–37)
AST SERPL-CCNC: 36 U/L (ref 15–37)
ATYPICAL LYMPHOCYTE RELATIVE PERCENT: 1 % (ref 0–6)
ATYPICAL LYMPHOCYTE RELATIVE PERCENT: 1 % (ref 0–6)
ATYPICAL LYMPHOCYTE RELATIVE PERCENT: 2 % (ref 0–6)
ATYPICAL LYMPHOCYTE RELATIVE PERCENT: 9 % (ref 0–6)
BACTERIA: ABNORMAL /HPF
BACTERIA: ABNORMAL /HPF
BANDED NEUTROPHILS RELATIVE PERCENT: 22 % (ref 0–7)
BANDED NEUTROPHILS RELATIVE PERCENT: 43 % (ref 0–7)
BANDED NEUTROPHILS RELATIVE PERCENT: 57 % (ref 0–7)
BANDED NEUTROPHILS RELATIVE PERCENT: 58 % (ref 0–7)
BANDED NEUTROPHILS RELATIVE PERCENT: 59 % (ref 0–7)
BANDED NEUTROPHILS RELATIVE PERCENT: 80 % (ref 0–7)
BASE EXCESS ARTERIAL: -12.5 MMOL/L (ref -3–3)
BASE EXCESS ARTERIAL: -17 (ref -3–3)
BASE EXCESS ARTERIAL: -17 MMOL/L (ref -3–3)
BASE EXCESS ARTERIAL: -17.2 MMOL/L (ref -3–3)
BASE EXCESS ARTERIAL: -17.4 MMOL/L (ref -3–3)
BASE EXCESS ARTERIAL: -18 MMOL/L (ref -3–3)
BASE EXCESS ARTERIAL: -19.8 MMOL/L (ref -3–3)
BASE EXCESS ARTERIAL: -5 (ref -3–3)
BASE EXCESS ARTERIAL: -8 (ref -3–3)
BASOPHILIC STIPPLING: ABNORMAL
BASOPHILS ABSOLUTE: 0 K/UL (ref 0–0.2)
BASOPHILS RELATIVE PERCENT: 0 %
BASOPHILS RELATIVE PERCENT: 0.1 %
BASOPHILS RELATIVE PERCENT: 0.2 %
BASOPHILS RELATIVE PERCENT: 0.2 %
BASOPHILS RELATIVE PERCENT: 0.5 %
BILIRUB SERPL-MCNC: 0.3 MG/DL (ref 0–1)
BILIRUB SERPL-MCNC: 0.3 MG/DL (ref 0–1)
BILIRUB SERPL-MCNC: 0.6 MG/DL (ref 0–1)
BILIRUBIN URINE: NEGATIVE
BLOOD BANK DISPENSE STATUS: NORMAL
BLOOD BANK PRODUCT CODE: NORMAL
BLOOD CULTURE, ROUTINE: NORMAL
BLOOD SMEAR REVIEW: NORMAL
BLOOD, URINE: ABNORMAL
BLOOD, URINE: NEGATIVE
BLOOD, URINE: NEGATIVE
BPU ID: NORMAL
BUN BLDV-MCNC: 10 MG/DL (ref 7–20)
BUN BLDV-MCNC: 10 MG/DL (ref 7–20)
BUN BLDV-MCNC: 13 MG/DL (ref 7–20)
BUN BLDV-MCNC: 14 MG/DL (ref 7–20)
BUN BLDV-MCNC: 16 MG/DL (ref 7–20)
BUN BLDV-MCNC: 17 MG/DL (ref 7–20)
BUN BLDV-MCNC: 17 MG/DL (ref 7–20)
BUN BLDV-MCNC: 18 MG/DL (ref 7–20)
BUN BLDV-MCNC: 18 MG/DL (ref 7–20)
BUN BLDV-MCNC: 20 MG/DL (ref 7–20)
BUN BLDV-MCNC: 22 MG/DL (ref 7–20)
BUN BLDV-MCNC: 22 MG/DL (ref 7–20)
BUN BLDV-MCNC: 28 MG/DL (ref 7–20)
BUN BLDV-MCNC: 28 MG/DL (ref 7–20)
BUN BLDV-MCNC: 30 MG/DL (ref 7–20)
BUN BLDV-MCNC: 31 MG/DL (ref 7–20)
BUN BLDV-MCNC: 32 MG/DL (ref 7–20)
BUN BLDV-MCNC: 36 MG/DL (ref 7–20)
BUN BLDV-MCNC: 39 MG/DL (ref 7–20)
BUN BLDV-MCNC: 40 MG/DL (ref 7–20)
BUN BLDV-MCNC: 46 MG/DL (ref 7–20)
BUN BLDV-MCNC: 49 MG/DL (ref 7–20)
BUN BLDV-MCNC: 49 MG/DL (ref 7–20)
BUN BLDV-MCNC: 50 MG/DL (ref 7–20)
BUN BLDV-MCNC: 9 MG/DL (ref 7–20)
C DIFF TOXIN/ANTIGEN: NORMAL
CALCIUM IONIZED: 0.78 MMOL/L (ref 1.12–1.32)
CALCIUM IONIZED: 0.82 MMOL/L (ref 1.12–1.32)
CALCIUM IONIZED: 0.96 MMOL/L (ref 1.12–1.32)
CALCIUM IONIZED: 0.97 MMOL/L (ref 1.12–1.32)
CALCIUM IONIZED: 0.98 MMOL/L (ref 1.12–1.32)
CALCIUM IONIZED: 0.98 MMOL/L (ref 1.12–1.32)
CALCIUM IONIZED: 0.99 MMOL/L (ref 1.12–1.32)
CALCIUM IONIZED: 1 MMOL/L (ref 1.12–1.32)
CALCIUM IONIZED: 1.01 MMOL/L (ref 1.12–1.32)
CALCIUM IONIZED: 1.02 MMOL/L (ref 1.12–1.32)
CALCIUM IONIZED: 1.06 MMOL/L (ref 1.12–1.32)
CALCIUM SERPL-MCNC: 5.3 MG/DL (ref 8.3–10.6)
CALCIUM SERPL-MCNC: 5.5 MG/DL (ref 8.3–10.6)
CALCIUM SERPL-MCNC: 5.8 MG/DL (ref 8.3–10.6)
CALCIUM SERPL-MCNC: 6 MG/DL (ref 8.3–10.6)
CALCIUM SERPL-MCNC: 6.1 MG/DL (ref 8.3–10.6)
CALCIUM SERPL-MCNC: 6.2 MG/DL (ref 8.3–10.6)
CALCIUM SERPL-MCNC: 6.2 MG/DL (ref 8.3–10.6)
CALCIUM SERPL-MCNC: 6.3 MG/DL (ref 8.3–10.6)
CALCIUM SERPL-MCNC: 6.5 MG/DL (ref 8.3–10.6)
CALCIUM SERPL-MCNC: 6.6 MG/DL (ref 8.3–10.6)
CALCIUM SERPL-MCNC: 6.7 MG/DL (ref 8.3–10.6)
CALCIUM SERPL-MCNC: 7 MG/DL (ref 8.3–10.6)
CALCIUM SERPL-MCNC: 7 MG/DL (ref 8.3–10.6)
CALCIUM SERPL-MCNC: 7.1 MG/DL (ref 8.3–10.6)
CALCIUM SERPL-MCNC: 7.4 MG/DL (ref 8.3–10.6)
CALCIUM SERPL-MCNC: 7.6 MG/DL (ref 8.3–10.6)
CALCIUM SERPL-MCNC: 7.8 MG/DL (ref 8.3–10.6)
CALCIUM SERPL-MCNC: 7.8 MG/DL (ref 8.3–10.6)
CALCIUM SERPL-MCNC: 8.1 MG/DL (ref 8.3–10.6)
CARBOXYHEMOGLOBIN ARTERIAL: 0.3 % (ref 0–1.5)
CHLORIDE BLD-SCNC: 100 MMOL/L (ref 99–110)
CHLORIDE BLD-SCNC: 101 MMOL/L (ref 99–110)
CHLORIDE BLD-SCNC: 102 MMOL/L (ref 99–110)
CHLORIDE BLD-SCNC: 103 MMOL/L (ref 99–110)
CHLORIDE BLD-SCNC: 104 MMOL/L (ref 99–110)
CHLORIDE BLD-SCNC: 104 MMOL/L (ref 99–110)
CHLORIDE BLD-SCNC: 105 MMOL/L (ref 99–110)
CHLORIDE BLD-SCNC: 105 MMOL/L (ref 99–110)
CHLORIDE BLD-SCNC: 106 MMOL/L (ref 99–110)
CHLORIDE BLD-SCNC: 107 MMOL/L (ref 99–110)
CHLORIDE BLD-SCNC: 108 MMOL/L (ref 99–110)
CHLORIDE BLD-SCNC: 109 MMOL/L (ref 99–110)
CHLORIDE BLD-SCNC: 110 MMOL/L (ref 99–110)
CHLORIDE BLD-SCNC: 110 MMOL/L (ref 99–110)
CHLORIDE BLD-SCNC: 111 MMOL/L (ref 99–110)
CHLORIDE BLD-SCNC: 98 MMOL/L (ref 99–110)
CHLORIDE URINE RANDOM: 45 MMOL/L
CLARITY: ABNORMAL
CLARITY: CLEAR
CLARITY: CLEAR
CO2: 15 MMOL/L (ref 21–32)
CO2: 16 MMOL/L (ref 21–32)
CO2: 17 MMOL/L (ref 21–32)
CO2: 19 MMOL/L (ref 21–32)
CO2: 19 MMOL/L (ref 21–32)
CO2: 20 MMOL/L (ref 21–32)
CO2: 20 MMOL/L (ref 21–32)
CO2: 21 MMOL/L (ref 21–32)
CO2: 21 MMOL/L (ref 21–32)
CO2: 22 MMOL/L (ref 21–32)
CO2: 23 MMOL/L (ref 21–32)
CO2: 24 MMOL/L (ref 21–32)
CO2: 27 MMOL/L (ref 21–32)
CO2: 28 MMOL/L (ref 21–32)
CO2: 28 MMOL/L (ref 21–32)
CO2: 30 MMOL/L (ref 21–32)
COLOR: YELLOW
CREAT SERPL-MCNC: 0.7 MG/DL (ref 0.8–1.3)
CREAT SERPL-MCNC: 0.8 MG/DL (ref 0.8–1.3)
CREAT SERPL-MCNC: 0.9 MG/DL (ref 0.8–1.3)
CREAT SERPL-MCNC: 0.9 MG/DL (ref 0.8–1.3)
CREAT SERPL-MCNC: 1 MG/DL (ref 0.8–1.3)
CREAT SERPL-MCNC: 1.1 MG/DL (ref 0.8–1.3)
CREAT SERPL-MCNC: 1.2 MG/DL (ref 0.8–1.3)
CREAT SERPL-MCNC: 1.2 MG/DL (ref 0.8–1.3)
CREAT SERPL-MCNC: 1.4 MG/DL (ref 0.8–1.3)
CREAT SERPL-MCNC: 1.5 MG/DL (ref 0.8–1.3)
CREAT SERPL-MCNC: 1.6 MG/DL (ref 0.8–1.3)
CREAT SERPL-MCNC: 1.6 MG/DL (ref 0.8–1.3)
CREAT SERPL-MCNC: 1.7 MG/DL (ref 0.8–1.3)
CREAT SERPL-MCNC: 1.9 MG/DL (ref 0.8–1.3)
CREAT SERPL-MCNC: 1.9 MG/DL (ref 0.8–1.3)
CREAT SERPL-MCNC: 2.1 MG/DL (ref 0.8–1.3)
CREATININE URINE: 89.8 MG/DL (ref 39–259)
CRENATED RBC'S: ABNORMAL
CULTURE, BLOOD 2: NORMAL
D DIMER: 1738 NG/ML DDU (ref 0–229)
DESCRIPTION BLOOD BANK: NORMAL
EKG ATRIAL RATE: 106 BPM
EKG DIAGNOSIS: NORMAL
EKG P AXIS: 58 DEGREES
EKG P-R INTERVAL: 140 MS
EKG Q-T INTERVAL: 362 MS
EKG QRS DURATION: 86 MS
EKG QTC CALCULATION (BAZETT): 480 MS
EKG R AXIS: -54 DEGREES
EKG T AXIS: 72 DEGREES
EKG VENTRICULAR RATE: 106 BPM
EOSINOPHILS ABSOLUTE: 0 K/UL (ref 0–0.6)
EOSINOPHILS ABSOLUTE: 0.1 K/UL (ref 0–0.6)
EOSINOPHILS RELATIVE PERCENT: 0 %
EOSINOPHILS RELATIVE PERCENT: 0.1 %
EOSINOPHILS RELATIVE PERCENT: 0.6 %
EOSINOPHILS RELATIVE PERCENT: 1 %
EPITHELIAL CELLS, UA: ABNORMAL /HPF (ref 0–5)
EPITHELIAL CELLS, UA: ABNORMAL /HPF (ref 0–5)
FIBRINOGEN: 449 MG/DL (ref 200–397)
GFR AFRICAN AMERICAN: 37
GFR AFRICAN AMERICAN: 42
GFR AFRICAN AMERICAN: 42
GFR AFRICAN AMERICAN: 48
GFR AFRICAN AMERICAN: 51
GFR AFRICAN AMERICAN: 51
GFR AFRICAN AMERICAN: 55
GFR AFRICAN AMERICAN: 60
GFR AFRICAN AMERICAN: >60
GFR NON-AFRICAN AMERICAN: 31
GFR NON-AFRICAN AMERICAN: 35
GFR NON-AFRICAN AMERICAN: 35
GFR NON-AFRICAN AMERICAN: 40
GFR NON-AFRICAN AMERICAN: 42
GFR NON-AFRICAN AMERICAN: 42
GFR NON-AFRICAN AMERICAN: 46
GFR NON-AFRICAN AMERICAN: 49
GFR NON-AFRICAN AMERICAN: 59
GFR NON-AFRICAN AMERICAN: 59
GFR NON-AFRICAN AMERICAN: >60
GLOBULIN: 1.4 G/DL
GLOBULIN: 1.7 G/DL
GLOBULIN: 1.8 G/DL
GLUCOSE BLD-MCNC: 107 MG/DL (ref 70–99)
GLUCOSE BLD-MCNC: 115 MG/DL (ref 70–99)
GLUCOSE BLD-MCNC: 116 MG/DL (ref 70–99)
GLUCOSE BLD-MCNC: 119 MG/DL (ref 70–99)
GLUCOSE BLD-MCNC: 119 MG/DL (ref 70–99)
GLUCOSE BLD-MCNC: 120 MG/DL (ref 70–99)
GLUCOSE BLD-MCNC: 121 MG/DL (ref 70–99)
GLUCOSE BLD-MCNC: 122 MG/DL (ref 70–99)
GLUCOSE BLD-MCNC: 125 MG/DL (ref 70–99)
GLUCOSE BLD-MCNC: 127 MG/DL (ref 70–99)
GLUCOSE BLD-MCNC: 141 MG/DL (ref 70–99)
GLUCOSE BLD-MCNC: 145 MG/DL (ref 70–99)
GLUCOSE BLD-MCNC: 147 MG/DL (ref 70–99)
GLUCOSE BLD-MCNC: 150 MG/DL (ref 70–99)
GLUCOSE BLD-MCNC: 151 MG/DL (ref 70–99)
GLUCOSE BLD-MCNC: 164 MG/DL (ref 70–99)
GLUCOSE BLD-MCNC: 169 MG/DL (ref 70–99)
GLUCOSE BLD-MCNC: 177 MG/DL (ref 70–99)
GLUCOSE BLD-MCNC: 290 MG/DL (ref 70–99)
GLUCOSE BLD-MCNC: 326 MG/DL (ref 70–99)
GLUCOSE BLD-MCNC: 329 MG/DL (ref 70–99)
GLUCOSE BLD-MCNC: 331 MG/DL (ref 70–99)
GLUCOSE BLD-MCNC: 333 MG/DL (ref 70–99)
GLUCOSE BLD-MCNC: 339 MG/DL (ref 70–99)
GLUCOSE BLD-MCNC: 351 MG/DL (ref 70–99)
GLUCOSE BLD-MCNC: 362 MG/DL (ref 70–99)
GLUCOSE BLD-MCNC: 383 MG/DL (ref 70–99)
GLUCOSE BLD-MCNC: 63 MG/DL (ref 70–99)
GLUCOSE URINE: NEGATIVE MG/DL
HAPTOGLOBIN: 368 MG/DL (ref 30–200)
HCO3 ARTERIAL: 14.1 MMOL/L (ref 21–29)
HCO3 ARTERIAL: 16.3 MMOL/L (ref 21–29)
HCO3 ARTERIAL: 16.4 MMOL/L (ref 21–29)
HCO3 ARTERIAL: 16.6 MMOL/L (ref 21–29)
HCO3 ARTERIAL: 17 MMOL/L (ref 21–29)
HCO3 ARTERIAL: 17.1 MMOL/L (ref 21–29)
HCO3 ARTERIAL: 22.3 MMOL/L (ref 21–29)
HCO3 ARTERIAL: 22.5 MMOL/L (ref 21–29)
HCO3 ARTERIAL: 26.9 MMOL/L (ref 21–29)
HCT VFR BLD CALC: 26.2 % (ref 40.5–52.5)
HCT VFR BLD CALC: 26.4 % (ref 40.5–52.5)
HCT VFR BLD CALC: 26.7 % (ref 40.5–52.5)
HCT VFR BLD CALC: 26.9 % (ref 40.5–52.5)
HCT VFR BLD CALC: 27.3 % (ref 40.5–52.5)
HCT VFR BLD CALC: 27.4 % (ref 40.5–52.5)
HCT VFR BLD CALC: 28 % (ref 40.5–52.5)
HCT VFR BLD CALC: 28.3 % (ref 40.5–52.5)
HCT VFR BLD CALC: 29.3 % (ref 40.5–52.5)
HCT VFR BLD CALC: 29.7 % (ref 40.5–52.5)
HCT VFR BLD CALC: 30.2 % (ref 40.5–52.5)
HCT VFR BLD CALC: 30.7 % (ref 40.5–52.5)
HCT VFR BLD CALC: 30.7 % (ref 40.5–52.5)
HCT VFR BLD CALC: 30.9 % (ref 40.5–52.5)
HCT VFR BLD CALC: 31.2 % (ref 40.5–52.5)
HCT VFR BLD CALC: 31.5 % (ref 40.5–52.5)
HCT VFR BLD CALC: 31.6 % (ref 40.5–52.5)
HCT VFR BLD CALC: 32.3 % (ref 40.5–52.5)
HCT VFR BLD CALC: 33 % (ref 40.5–52.5)
HCT VFR BLD CALC: 34.7 % (ref 40.5–52.5)
HEMATOLOGY PATH CONSULT: NO
HEMATOLOGY PATH CONSULT: YES
HEMOGLOBIN, ART, EXTENDED: 10 G/DL (ref 13.5–17.5)
HEMOGLOBIN, ART, EXTENDED: 10.2 G/DL (ref 13.5–17.5)
HEMOGLOBIN, ART, EXTENDED: 10.5 G/DL (ref 13.5–17.5)
HEMOGLOBIN, ART, EXTENDED: 10.6 G/DL (ref 13.5–17.5)
HEMOGLOBIN, ART, EXTENDED: 9.7 G/DL (ref 13.5–17.5)
HEMOGLOBIN: 10 G/DL (ref 13.5–17.5)
HEMOGLOBIN: 10.1 G/DL (ref 13.5–17.5)
HEMOGLOBIN: 10.2 G/DL (ref 13.5–17.5)
HEMOGLOBIN: 10.3 G/DL (ref 13.5–17.5)
HEMOGLOBIN: 10.3 G/DL (ref 13.5–17.5)
HEMOGLOBIN: 10.5 G/DL (ref 13.5–17.5)
HEMOGLOBIN: 10.6 G/DL (ref 13.5–17.5)
HEMOGLOBIN: 10.7 G/DL (ref 13.5–17.5)
HEMOGLOBIN: 10.9 G/DL (ref 13.5–17.5)
HEMOGLOBIN: 11.4 G/DL (ref 13.5–17.5)
HEMOGLOBIN: 6.7 GM/DL (ref 13.5–17.5)
HEMOGLOBIN: 8.3 G/DL (ref 13.5–17.5)
HEMOGLOBIN: 8.7 G/DL (ref 13.5–17.5)
HEMOGLOBIN: 8.8 G/DL (ref 13.5–17.5)
HEMOGLOBIN: 8.9 G/DL (ref 13.5–17.5)
HEMOGLOBIN: 9.1 G/DL (ref 13.5–17.5)
HEMOGLOBIN: 9.2 GM/DL (ref 13.5–17.5)
HEMOGLOBIN: 9.4 G/DL (ref 13.5–17.5)
HEMOGLOBIN: 9.4 G/DL (ref 13.5–17.5)
HEMOGLOBIN: 9.8 G/DL (ref 13.5–17.5)
HEPARIN INDUCED PLATELET ANTIBODY: NEGATIVE
HYALINE CASTS: ABNORMAL /LPF (ref 0–2)
HYPOCHROMIA: ABNORMAL
HYPOCHROMIA: ABNORMAL
INR BLD: 0.99 (ref 0.86–1.14)
INR BLD: 1.03 (ref 0.86–1.14)
KETONES, URINE: NEGATIVE MG/DL
LACTATE DEHYDROGENASE: 231 U/L (ref 100–190)
LACTATE DEHYDROGENASE: 295 U/L (ref 100–190)
LACTATE: 1.14 MMOL/L (ref 0.4–2)
LACTATE: 6.91 MMOL/L (ref 0.4–2)
LACTIC ACID, SEPSIS: 2.8 MMOL/L (ref 0.4–1.9)
LACTIC ACID, SEPSIS: 3.2 MMOL/L (ref 0.4–1.9)
LACTIC ACID: 1 MMOL/L (ref 0.4–2)
LACTIC ACID: 2.7 MMOL/L (ref 0.4–2)
LACTIC ACID: 5.5 MMOL/L (ref 0.4–2)
LEUKOCYTE ESTERASE, URINE: NEGATIVE
LIPASE: 12 U/L (ref 13–60)
LIPASE: 8 U/L (ref 13–60)
LYMPHOCYTES ABSOLUTE: 0.6 K/UL (ref 1–5.1)
LYMPHOCYTES ABSOLUTE: 0.6 K/UL (ref 1–5.1)
LYMPHOCYTES ABSOLUTE: 0.7 K/UL (ref 1–5.1)
LYMPHOCYTES ABSOLUTE: 0.8 K/UL (ref 1–5.1)
LYMPHOCYTES ABSOLUTE: 0.9 K/UL (ref 1–5.1)
LYMPHOCYTES ABSOLUTE: 0.9 K/UL (ref 1–5.1)
LYMPHOCYTES ABSOLUTE: 1.1 K/UL (ref 1–5.1)
LYMPHOCYTES ABSOLUTE: 1.3 K/UL (ref 1–5.1)
LYMPHOCYTES ABSOLUTE: 1.3 K/UL (ref 1–5.1)
LYMPHOCYTES ABSOLUTE: 1.4 K/UL (ref 1–5.1)
LYMPHOCYTES ABSOLUTE: 1.5 K/UL (ref 1–5.1)
LYMPHOCYTES ABSOLUTE: 1.5 K/UL (ref 1–5.1)
LYMPHOCYTES ABSOLUTE: 1.6 K/UL (ref 1–5.1)
LYMPHOCYTES ABSOLUTE: 2.1 K/UL (ref 1–5.1)
LYMPHOCYTES ABSOLUTE: 2.1 K/UL (ref 1–5.1)
LYMPHOCYTES ABSOLUTE: 2.8 K/UL (ref 1–5.1)
LYMPHOCYTES RELATIVE PERCENT: 10 %
LYMPHOCYTES RELATIVE PERCENT: 12.6 %
LYMPHOCYTES RELATIVE PERCENT: 13 %
LYMPHOCYTES RELATIVE PERCENT: 13.3 %
LYMPHOCYTES RELATIVE PERCENT: 14 %
LYMPHOCYTES RELATIVE PERCENT: 16.5 %
LYMPHOCYTES RELATIVE PERCENT: 16.6 %
LYMPHOCYTES RELATIVE PERCENT: 18.6 %
LYMPHOCYTES RELATIVE PERCENT: 19 %
LYMPHOCYTES RELATIVE PERCENT: 19.4 %
LYMPHOCYTES RELATIVE PERCENT: 21.6 %
LYMPHOCYTES RELATIVE PERCENT: 21.6 %
LYMPHOCYTES RELATIVE PERCENT: 23.1 %
LYMPHOCYTES RELATIVE PERCENT: 24 %
LYMPHOCYTES RELATIVE PERCENT: 28 %
LYMPHOCYTES RELATIVE PERCENT: 28 %
MACROCYTES: ABNORMAL
MAGNESIUM: 1.2 MG/DL (ref 1.8–2.4)
MAGNESIUM: 1.6 MG/DL (ref 1.8–2.4)
MAGNESIUM: 1.7 MG/DL (ref 1.8–2.4)
MAGNESIUM: 1.7 MG/DL (ref 1.8–2.4)
MAGNESIUM: 1.8 MG/DL (ref 1.8–2.4)
MAGNESIUM: 1.9 MG/DL (ref 1.8–2.4)
MAGNESIUM: 1.9 MG/DL (ref 1.8–2.4)
MAGNESIUM: 2 MG/DL (ref 1.8–2.4)
MCH RBC QN AUTO: 29.8 PG (ref 26–34)
MCH RBC QN AUTO: 29.9 PG (ref 26–34)
MCH RBC QN AUTO: 29.9 PG (ref 26–34)
MCH RBC QN AUTO: 30 PG (ref 26–34)
MCH RBC QN AUTO: 30.1 PG (ref 26–34)
MCH RBC QN AUTO: 30.2 PG (ref 26–34)
MCH RBC QN AUTO: 30.2 PG (ref 26–34)
MCH RBC QN AUTO: 30.3 PG (ref 26–34)
MCH RBC QN AUTO: 30.3 PG (ref 26–34)
MCH RBC QN AUTO: 30.4 PG (ref 26–34)
MCH RBC QN AUTO: 30.5 PG (ref 26–34)
MCH RBC QN AUTO: 30.5 PG (ref 26–34)
MCH RBC QN AUTO: 30.6 PG (ref 26–34)
MCHC RBC AUTO-ENTMCNC: 31 G/DL (ref 31–36)
MCHC RBC AUTO-ENTMCNC: 31.5 G/DL (ref 31–36)
MCHC RBC AUTO-ENTMCNC: 31.7 G/DL (ref 31–36)
MCHC RBC AUTO-ENTMCNC: 32.4 G/DL (ref 31–36)
MCHC RBC AUTO-ENTMCNC: 33 G/DL (ref 31–36)
MCHC RBC AUTO-ENTMCNC: 33.2 G/DL (ref 31–36)
MCHC RBC AUTO-ENTMCNC: 33.2 G/DL (ref 31–36)
MCHC RBC AUTO-ENTMCNC: 33.3 G/DL (ref 31–36)
MCHC RBC AUTO-ENTMCNC: 33.4 G/DL (ref 31–36)
MCHC RBC AUTO-ENTMCNC: 33.4 G/DL (ref 31–36)
MCHC RBC AUTO-ENTMCNC: 33.5 G/DL (ref 31–36)
MCHC RBC AUTO-ENTMCNC: 33.5 G/DL (ref 31–36)
MCHC RBC AUTO-ENTMCNC: 33.6 G/DL (ref 31–36)
MCHC RBC AUTO-ENTMCNC: 33.7 G/DL (ref 31–36)
MCV RBC AUTO: 89.9 FL (ref 80–100)
MCV RBC AUTO: 90 FL (ref 80–100)
MCV RBC AUTO: 90.3 FL (ref 80–100)
MCV RBC AUTO: 90.4 FL (ref 80–100)
MCV RBC AUTO: 90.4 FL (ref 80–100)
MCV RBC AUTO: 90.6 FL (ref 80–100)
MCV RBC AUTO: 90.6 FL (ref 80–100)
MCV RBC AUTO: 90.7 FL (ref 80–100)
MCV RBC AUTO: 90.8 FL (ref 80–100)
MCV RBC AUTO: 90.8 FL (ref 80–100)
MCV RBC AUTO: 90.9 FL (ref 80–100)
MCV RBC AUTO: 90.9 FL (ref 80–100)
MCV RBC AUTO: 91.1 FL (ref 80–100)
MCV RBC AUTO: 91.1 FL (ref 80–100)
MCV RBC AUTO: 91.2 FL (ref 80–100)
MCV RBC AUTO: 91.3 FL (ref 80–100)
MCV RBC AUTO: 92.5 FL (ref 80–100)
MCV RBC AUTO: 94 FL (ref 80–100)
MCV RBC AUTO: 95 FL (ref 80–100)
MCV RBC AUTO: 96.2 FL (ref 80–100)
METAMYELOCYTES RELATIVE PERCENT: 13 %
METAMYELOCYTES RELATIVE PERCENT: 4 %
METAMYELOCYTES RELATIVE PERCENT: 5 %
METAMYELOCYTES RELATIVE PERCENT: 7 %
METHEMOGLOBIN ARTERIAL: 0.7 %
METHEMOGLOBIN ARTERIAL: 0.7 %
METHEMOGLOBIN ARTERIAL: 0.8 %
METHEMOGLOBIN ARTERIAL: 0.8 %
METHEMOGLOBIN ARTERIAL: 0.9 %
MICROSCOPIC EXAMINATION: NORMAL
MICROSCOPIC EXAMINATION: YES
MICROSCOPIC EXAMINATION: YES
MONOCYTES ABSOLUTE: 0 K/UL (ref 0–1.3)
MONOCYTES ABSOLUTE: 0.1 K/UL (ref 0–1.3)
MONOCYTES ABSOLUTE: 0.2 K/UL (ref 0–1.3)
MONOCYTES ABSOLUTE: 0.5 K/UL (ref 0–1.3)
MONOCYTES ABSOLUTE: 0.5 K/UL (ref 0–1.3)
MONOCYTES RELATIVE PERCENT: 1 %
MONOCYTES RELATIVE PERCENT: 1 %
MONOCYTES RELATIVE PERCENT: 1.1 %
MONOCYTES RELATIVE PERCENT: 1.2 %
MONOCYTES RELATIVE PERCENT: 1.4 %
MONOCYTES RELATIVE PERCENT: 1.6 %
MONOCYTES RELATIVE PERCENT: 16 %
MONOCYTES RELATIVE PERCENT: 2 %
MONOCYTES RELATIVE PERCENT: 2.1 %
MONOCYTES RELATIVE PERCENT: 2.1 %
MONOCYTES RELATIVE PERCENT: 2.2 %
MONOCYTES RELATIVE PERCENT: 5 %
MONONUCLEAR UNIDENTIFIED CELLS: 2 %
MUCUS: ABNORMAL /LPF
MYELOCYTE PERCENT: 1 %
MYELOCYTE PERCENT: 2 %
MYELOCYTE PERCENT: 4 %
NEUTROPHILS ABSOLUTE: 1.4 K/UL (ref 1.7–7.7)
NEUTROPHILS ABSOLUTE: 1.6 K/UL (ref 1.7–7.7)
NEUTROPHILS ABSOLUTE: 2.1 K/UL (ref 1.7–7.7)
NEUTROPHILS ABSOLUTE: 4.3 K/UL (ref 1.7–7.7)
NEUTROPHILS ABSOLUTE: 4.9 K/UL (ref 1.7–7.7)
NEUTROPHILS ABSOLUTE: 5.2 K/UL (ref 1.7–7.7)
NEUTROPHILS ABSOLUTE: 6.1 K/UL (ref 1.7–7.7)
NEUTROPHILS ABSOLUTE: 6.2 K/UL (ref 1.7–7.7)
NEUTROPHILS ABSOLUTE: 6.4 K/UL (ref 1.7–7.7)
NEUTROPHILS ABSOLUTE: 6.6 K/UL (ref 1.7–7.7)
NEUTROPHILS ABSOLUTE: 6.7 K/UL (ref 1.7–7.7)
NEUTROPHILS ABSOLUTE: 6.8 K/UL (ref 1.7–7.7)
NEUTROPHILS ABSOLUTE: 7.1 K/UL (ref 1.7–7.7)
NEUTROPHILS ABSOLUTE: 7.4 K/UL (ref 1.7–7.7)
NEUTROPHILS ABSOLUTE: 7.6 K/UL (ref 1.7–7.7)
NEUTROPHILS ABSOLUTE: 8.5 K/UL (ref 1.7–7.7)
NEUTROPHILS RELATIVE PERCENT: 0 %
NEUTROPHILS RELATIVE PERCENT: 1 %
NEUTROPHILS RELATIVE PERCENT: 10 %
NEUTROPHILS RELATIVE PERCENT: 19 %
NEUTROPHILS RELATIVE PERCENT: 3 %
NEUTROPHILS RELATIVE PERCENT: 44 %
NEUTROPHILS RELATIVE PERCENT: 75.1 %
NEUTROPHILS RELATIVE PERCENT: 76.6 %
NEUTROPHILS RELATIVE PERCENT: 76.7 %
NEUTROPHILS RELATIVE PERCENT: 78.2 %
NEUTROPHILS RELATIVE PERCENT: 79.2 %
NEUTROPHILS RELATIVE PERCENT: 81.3 %
NEUTROPHILS RELATIVE PERCENT: 82.3 %
NEUTROPHILS RELATIVE PERCENT: 84.7 %
NEUTROPHILS RELATIVE PERCENT: 85.2 %
NEUTROPHILS RELATIVE PERCENT: 85.5 %
NITRITE, URINE: NEGATIVE
NUCLEATED RED BLOOD CELLS: 2 /100 WBC
NUCLEATED RED BLOOD CELLS: 2 /100 WBC
NUCLEATED RED BLOOD CELLS: 3 /100 WBC
NUCLEATED RED BLOOD CELLS: 3 /100 WBC
O2 CONTENT ARTERIAL: 11 ML/DL
O2 CONTENT ARTERIAL: 12 ML/DL
O2 CONTENT ARTERIAL: 12 ML/DL
O2 CONTENT ARTERIAL: 13 ML/DL
O2 CONTENT ARTERIAL: 13 ML/DL
O2 SAT, ARTERIAL: 100 % (ref 93–100)
O2 SAT, ARTERIAL: 59 % (ref 93–100)
O2 SAT, ARTERIAL: 67 % (ref 93–100)
O2 SAT, ARTERIAL: 81.9 %
O2 SAT, ARTERIAL: 84.9 %
O2 SAT, ARTERIAL: 86.3 %
O2 SAT, ARTERIAL: 87.3 %
O2 SAT, ARTERIAL: 88.7 %
O2 SAT, ARTERIAL: 98.9 %
O2 THERAPY: ABNORMAL
PCO2 ARTERIAL: 105.6 MMHG (ref 35–45)
PCO2 ARTERIAL: 108.8 MMHG (ref 35–45)
PCO2 ARTERIAL: 116.7 MM HG (ref 35–45)
PCO2 ARTERIAL: 86 MM HG (ref 35–45)
PCO2 ARTERIAL: 86.6 MMHG (ref 35–45)
PCO2 ARTERIAL: 89 MMHG (ref 35–45)
PCO2 ARTERIAL: 89.4 MMHG (ref 35–45)
PCO2 ARTERIAL: 93.9 MMHG (ref 35–45)
PCO2 ARTERIAL: 95.5 MM HG (ref 35–45)
PDW BLD-RTO: 16 % (ref 12.4–15.4)
PDW BLD-RTO: 16.3 % (ref 12.4–15.4)
PDW BLD-RTO: 16.6 % (ref 12.4–15.4)
PDW BLD-RTO: 16.7 % (ref 12.4–15.4)
PDW BLD-RTO: 16.7 % (ref 12.4–15.4)
PDW BLD-RTO: 17.3 % (ref 12.4–15.4)
PDW BLD-RTO: 17.6 % (ref 12.4–15.4)
PDW BLD-RTO: 17.7 % (ref 12.4–15.4)
PDW BLD-RTO: 17.8 % (ref 12.4–15.4)
PDW BLD-RTO: 17.9 % (ref 12.4–15.4)
PDW BLD-RTO: 18 % (ref 12.4–15.4)
PDW BLD-RTO: 18.2 % (ref 12.4–15.4)
PDW BLD-RTO: 18.3 % (ref 12.4–15.4)
PDW BLD-RTO: 19 % (ref 12.4–15.4)
PERFORMED ON: ABNORMAL
PH ARTERIAL: 6.81 (ref 7.35–7.45)
PH ARTERIAL: 6.83 (ref 7.35–7.45)
PH ARTERIAL: 6.86 (ref 7.35–7.45)
PH ARTERIAL: 6.87 (ref 7.35–7.45)
PH ARTERIAL: 6.88 (ref 7.35–7.45)
PH ARTERIAL: 6.88 (ref 7.35–7.45)
PH ARTERIAL: 6.94 (ref 7.35–7.45)
PH ARTERIAL: 6.97 (ref 7.35–7.45)
PH ARTERIAL: 7.03 (ref 7.35–7.45)
PH UA: 5.5 (ref 5–8)
PH UA: 5.5 (ref 5–8)
PH UA: 6.5 (ref 5–8)
PH VENOUS: 6.83 (ref 7.35–7.45)
PH VENOUS: 6.89 (ref 7.35–7.45)
PH VENOUS: 6.91 (ref 7.35–7.45)
PH VENOUS: 6.94 (ref 7.35–7.45)
PH VENOUS: 6.94 (ref 7.35–7.45)
PH VENOUS: 6.96 (ref 7.35–7.45)
PH VENOUS: 6.99 (ref 7.35–7.45)
PH VENOUS: 7.06 (ref 7.35–7.45)
PH VENOUS: 7.42 (ref 7.35–7.45)
PHOSPHORUS: 1.3 MG/DL (ref 2.5–4.9)
PHOSPHORUS: 1.5 MG/DL (ref 2.5–4.9)
PHOSPHORUS: 1.9 MG/DL (ref 2.5–4.9)
PHOSPHORUS: 2.4 MG/DL (ref 2.5–4.9)
PHOSPHORUS: 3.3 MG/DL (ref 2.5–4.9)
PHOSPHORUS: 4.8 MG/DL (ref 2.5–4.9)
PHOSPHORUS: 4.8 MG/DL (ref 2.5–4.9)
PHOSPHORUS: 5.6 MG/DL (ref 2.5–4.9)
PHOSPHORUS: 5.9 MG/DL (ref 2.5–4.9)
PHOSPHORUS: 6.3 MG/DL (ref 2.5–4.9)
PHOSPHORUS: 7.1 MG/DL (ref 2.5–4.9)
PHOSPHORUS: 7.3 MG/DL (ref 2.5–4.9)
PHOSPHORUS: 8.1 MG/DL (ref 2.5–4.9)
PLATELET # BLD: 102 K/UL (ref 135–450)
PLATELET # BLD: 105 K/UL (ref 135–450)
PLATELET # BLD: 11 K/UL (ref 135–450)
PLATELET # BLD: 125 K/UL (ref 135–450)
PLATELET # BLD: 133 K/UL (ref 135–450)
PLATELET # BLD: 133 K/UL (ref 135–450)
PLATELET # BLD: 137 K/UL (ref 135–450)
PLATELET # BLD: 145 K/UL (ref 135–450)
PLATELET # BLD: 147 K/UL (ref 135–450)
PLATELET # BLD: 15 K/UL (ref 135–450)
PLATELET # BLD: 150 K/UL (ref 135–450)
PLATELET # BLD: 181 K/UL (ref 135–450)
PLATELET # BLD: 239 K/UL (ref 135–450)
PLATELET # BLD: 241 K/UL (ref 135–450)
PLATELET # BLD: 246 K/UL (ref 135–450)
PLATELET # BLD: 254 K/UL (ref 135–450)
PLATELET # BLD: 39 K/UL (ref 135–450)
PLATELET # BLD: 68 K/UL (ref 135–450)
PLATELET # BLD: 88 K/UL (ref 135–450)
PLATELET # BLD: 89 K/UL (ref 135–450)
PLATELET ANTIBODY, IGG: NEGATIVE
PLATELET ANTIBODY, IGM: NEGATIVE
PLATELET SLIDE REVIEW: ABNORMAL
PLATELET SLIDE REVIEW: ADEQUATE
PLATELET SLIDE REVIEW: ADEQUATE
PMV BLD AUTO: 10.2 FL (ref 5–10.5)
PMV BLD AUTO: 10.3 FL (ref 5–10.5)
PMV BLD AUTO: 10.4 FL (ref 5–10.5)
PMV BLD AUTO: 7.1 FL (ref 5–10.5)
PMV BLD AUTO: 7.3 FL (ref 5–10.5)
PMV BLD AUTO: 7.3 FL (ref 5–10.5)
PMV BLD AUTO: 7.5 FL (ref 5–10.5)
PMV BLD AUTO: 7.7 FL (ref 5–10.5)
PMV BLD AUTO: 7.9 FL (ref 5–10.5)
PMV BLD AUTO: 8.4 FL (ref 5–10.5)
PMV BLD AUTO: 8.5 FL (ref 5–10.5)
PMV BLD AUTO: 8.8 FL (ref 5–10.5)
PMV BLD AUTO: 8.9 FL (ref 5–10.5)
PMV BLD AUTO: 9 FL (ref 5–10.5)
PMV BLD AUTO: 9.2 FL (ref 5–10.5)
PMV BLD AUTO: 9.2 FL (ref 5–10.5)
PMV BLD AUTO: 9.3 FL (ref 5–10.5)
PMV BLD AUTO: 9.4 FL (ref 5–10.5)
PMV BLD AUTO: 9.5 FL (ref 5–10.5)
PMV BLD AUTO: 9.6 FL (ref 5–10.5)
PO2 ARTERIAL: 235.3 MMHG (ref 75–108)
PO2 ARTERIAL: 369.6 MM HG (ref 75–108)
PO2 ARTERIAL: 54.5 MM HG (ref 75–108)
PO2 ARTERIAL: 55.9 MM HG (ref 75–108)
PO2 ARTERIAL: 58.5 MMHG (ref 75–108)
PO2 ARTERIAL: 62.7 MMHG (ref 75–108)
PO2 ARTERIAL: 65.1 MMHG (ref 75–108)
PO2 ARTERIAL: 67.4 MMHG (ref 75–108)
PO2 ARTERIAL: 79.2 MMHG (ref 75–108)
POC HEMATOCRIT: 20 % (ref 40.5–52.5)
POC HEMATOCRIT: 27 % (ref 40.5–52.5)
POC POTASSIUM: 4.9 MMOL/L (ref 3.5–5.1)
POC POTASSIUM: 4.9 MMOL/L (ref 3.5–5.1)
POC SAMPLE TYPE: ABNORMAL
POC SODIUM: 135 MMOL/L (ref 136–145)
POC SODIUM: 143 MMOL/L (ref 136–145)
POIKILOCYTES: ABNORMAL
POLYCHROMASIA: ABNORMAL
POTASSIUM REFLEX MAGNESIUM: 2.9 MMOL/L (ref 3.5–5.1)
POTASSIUM REFLEX MAGNESIUM: 4.1 MMOL/L (ref 3.5–5.1)
POTASSIUM REFLEX MAGNESIUM: 4.1 MMOL/L (ref 3.5–5.1)
POTASSIUM REFLEX MAGNESIUM: 4.5 MMOL/L (ref 3.5–5.1)
POTASSIUM REFLEX MAGNESIUM: 4.6 MMOL/L (ref 3.5–5.1)
POTASSIUM REFLEX MAGNESIUM: 4.9 MMOL/L (ref 3.5–5.1)
POTASSIUM REFLEX MAGNESIUM: 5 MMOL/L (ref 3.5–5.1)
POTASSIUM REFLEX MAGNESIUM: 5.1 MMOL/L (ref 3.5–5.1)
POTASSIUM REFLEX MAGNESIUM: 5.2 MMOL/L (ref 3.5–5.1)
POTASSIUM REFLEX MAGNESIUM: 5.4 MMOL/L (ref 3.5–5.1)
POTASSIUM REFLEX MAGNESIUM: 5.5 MMOL/L (ref 3.5–5.1)
POTASSIUM SERPL-SCNC: 3.2 MMOL/L (ref 3.5–5.1)
POTASSIUM SERPL-SCNC: 4.2 MMOL/L (ref 3.5–5.1)
POTASSIUM SERPL-SCNC: 4.2 MMOL/L (ref 3.5–5.1)
POTASSIUM SERPL-SCNC: 4.3 MMOL/L (ref 3.5–5.1)
POTASSIUM SERPL-SCNC: 4.4 MMOL/L (ref 3.5–5.1)
POTASSIUM SERPL-SCNC: 4.7 MMOL/L (ref 3.5–5.1)
POTASSIUM SERPL-SCNC: 4.7 MMOL/L (ref 3.5–5.1)
POTASSIUM SERPL-SCNC: 4.8 MMOL/L (ref 3.5–5.1)
POTASSIUM SERPL-SCNC: 5 MMOL/L (ref 3.5–5.1)
POTASSIUM SERPL-SCNC: 5.1 MMOL/L (ref 3.5–5.1)
POTASSIUM SERPL-SCNC: 5.1 MMOL/L (ref 3.5–5.1)
POTASSIUM SERPL-SCNC: 5.3 MMOL/L (ref 3.5–5.1)
POTASSIUM SERPL-SCNC: 5.6 MMOL/L (ref 3.5–5.1)
POTASSIUM SERPL-SCNC: 5.8 MMOL/L (ref 3.5–5.1)
POTASSIUM SERPL-SCNC: 5.9 MMOL/L (ref 3.5–5.1)
POTASSIUM, UR: 75.6 MMOL/L
PROTEIN UA: 30 MG/DL
PROTEIN UA: 30 MG/DL
PROTEIN UA: NEGATIVE MG/DL
PROTHROMBIN TIME: 11.5 SEC (ref 10–13.2)
PROTHROMBIN TIME: 12 SEC (ref 10–13.2)
RBC # BLD: 2.79 M/UL (ref 4.2–5.9)
RBC # BLD: 2.9 M/UL (ref 4.2–5.9)
RBC # BLD: 2.92 M/UL (ref 4.2–5.9)
RBC # BLD: 2.96 M/UL (ref 4.2–5.9)
RBC # BLD: 3 M/UL (ref 4.2–5.9)
RBC # BLD: 3.03 M/UL (ref 4.2–5.9)
RBC # BLD: 3.05 M/UL (ref 4.2–5.9)
RBC # BLD: 3.07 M/UL (ref 4.2–5.9)
RBC # BLD: 3.11 M/UL (ref 4.2–5.9)
RBC # BLD: 3.28 M/UL (ref 4.2–5.9)
RBC # BLD: 3.28 M/UL (ref 4.2–5.9)
RBC # BLD: 3.32 M/UL (ref 4.2–5.9)
RBC # BLD: 3.4 M/UL (ref 4.2–5.9)
RBC # BLD: 3.4 M/UL (ref 4.2–5.9)
RBC # BLD: 3.42 M/UL (ref 4.2–5.9)
RBC # BLD: 3.46 M/UL (ref 4.2–5.9)
RBC # BLD: 3.51 M/UL (ref 4.2–5.9)
RBC # BLD: 3.56 M/UL (ref 4.2–5.9)
RBC # BLD: 3.64 M/UL (ref 4.2–5.9)
RBC # BLD: 3.81 M/UL (ref 4.2–5.9)
RBC UA: ABNORMAL /HPF (ref 0–4)
RBC UA: ABNORMAL /HPF (ref 0–4)
SCHISTOCYTES: ABNORMAL
SCHISTOCYTES: ABNORMAL
SLIDE REVIEW: ABNORMAL
SODIUM BLD-SCNC: 127 MMOL/L (ref 136–145)
SODIUM BLD-SCNC: 131 MMOL/L (ref 136–145)
SODIUM BLD-SCNC: 131 MMOL/L (ref 136–145)
SODIUM BLD-SCNC: 132 MMOL/L (ref 136–145)
SODIUM BLD-SCNC: 133 MMOL/L (ref 136–145)
SODIUM BLD-SCNC: 134 MMOL/L (ref 136–145)
SODIUM BLD-SCNC: 136 MMOL/L (ref 136–145)
SODIUM BLD-SCNC: 137 MMOL/L (ref 136–145)
SODIUM BLD-SCNC: 137 MMOL/L (ref 136–145)
SODIUM BLD-SCNC: 138 MMOL/L (ref 136–145)
SODIUM BLD-SCNC: 139 MMOL/L (ref 136–145)
SODIUM BLD-SCNC: 140 MMOL/L (ref 136–145)
SODIUM BLD-SCNC: 140 MMOL/L (ref 136–145)
SODIUM BLD-SCNC: 141 MMOL/L (ref 136–145)
SODIUM BLD-SCNC: 141 MMOL/L (ref 136–145)
SODIUM BLD-SCNC: 144 MMOL/L (ref 136–145)
SODIUM URINE: 35 MMOL/L
SPECIFIC GRAVITY UA: 1.02 (ref 1–1.03)
SPHEROCYTES: ABNORMAL
TCO2 ARTERIAL: 16.8 MMOL/L
TCO2 ARTERIAL: 19.1 MMOL/L
TCO2 ARTERIAL: 19.2 MMOL/L
TCO2 ARTERIAL: 19.5 MMOL/L
TCO2 ARTERIAL: 20 MMOL/L
TCO2 ARTERIAL: 20.4 MMOL/L
TCO2 ARTERIAL: 25 MMOL/L
TCO2 ARTERIAL: 25.6 MMOL/L
TCO2 ARTERIAL: 31 MMOL/L
TEAR DROP CELLS: ABNORMAL
TOTAL CK: 50 U/L (ref 39–308)
TOTAL PROTEIN: 2.7 G/DL (ref 6.4–8.2)
TOTAL PROTEIN: 4.4 G/DL (ref 6.4–8.2)
TOTAL PROTEIN: 4.4 G/DL (ref 6.4–8.2)
TOXIC GRANULATION: PRESENT
TRIGL SERPL-MCNC: 111 MG/DL (ref 0–150)
TROPONIN: <0.01 NG/ML
URIC ACID, SERUM: 3.8 MG/DL (ref 3.5–7.2)
URINE REFLEX TO CULTURE: ABNORMAL
URINE REFLEX TO CULTURE: NORMAL
URINE TYPE: ABNORMAL
URINE TYPE: ABNORMAL
URINE TYPE: NORMAL
UROBILINOGEN, URINE: 0.2 E.U./DL
VACUOLATED NEUTROPHILS: PRESENT
WBC # BLD: 10.1 K/UL (ref 4–11)
WBC # BLD: 10.6 K/UL (ref 4–11)
WBC # BLD: 10.9 K/UL (ref 4–11)
WBC # BLD: 2 K/UL (ref 4–11)
WBC # BLD: 2.9 K/UL (ref 4–11)
WBC # BLD: 3 K/UL (ref 4–11)
WBC # BLD: 5.1 K/UL (ref 4–11)
WBC # BLD: 5.6 K/UL (ref 4–11)
WBC # BLD: 6.9 K/UL (ref 4–11)
WBC # BLD: 7.1 K/UL (ref 4–11)
WBC # BLD: 7.3 K/UL (ref 4–11)
WBC # BLD: 7.8 K/UL (ref 4–11)
WBC # BLD: 8 K/UL (ref 4–11)
WBC # BLD: 8 K/UL (ref 4–11)
WBC # BLD: 8.2 K/UL (ref 4–11)
WBC # BLD: 8.4 K/UL (ref 4–11)
WBC # BLD: 9.7 K/UL (ref 4–11)
WBC # BLD: 9.9 K/UL (ref 4–11)
WBC UA: ABNORMAL /HPF (ref 0–5)
WBC UA: ABNORMAL /HPF (ref 0–5)

## 2020-01-01 PROCEDURE — 2580000003 HC RX 258: Performed by: INTERNAL MEDICINE

## 2020-01-01 PROCEDURE — 80048 BASIC METABOLIC PNL TOTAL CA: CPT

## 2020-01-01 PROCEDURE — 94750 HC PULMONARY COMPLIANCE STUDY: CPT

## 2020-01-01 PROCEDURE — 94640 AIRWAY INHALATION TREATMENT: CPT

## 2020-01-01 PROCEDURE — C9113 INJ PANTOPRAZOLE SODIUM, VIA: HCPCS | Performed by: SURGERY

## 2020-01-01 PROCEDURE — 99285 EMERGENCY DEPT VISIT HI MDM: CPT

## 2020-01-01 PROCEDURE — 6360000002 HC RX W HCPCS: Performed by: SURGERY

## 2020-01-01 PROCEDURE — 6360000002 HC RX W HCPCS: Performed by: INTERNAL MEDICINE

## 2020-01-01 PROCEDURE — 44120 REMOVAL OF SMALL INTESTINE: CPT | Performed by: SURGERY

## 2020-01-01 PROCEDURE — 2700000000 HC OXYGEN THERAPY PER DAY

## 2020-01-01 PROCEDURE — 83615 LACTATE (LD) (LDH) ENZYME: CPT

## 2020-01-01 PROCEDURE — 6370000000 HC RX 637 (ALT 250 FOR IP): Performed by: INTERNAL MEDICINE

## 2020-01-01 PROCEDURE — 85025 COMPLETE CBC W/AUTO DIFF WBC: CPT

## 2020-01-01 PROCEDURE — 88360 TUMOR IMMUNOHISTOCHEM/MANUAL: CPT

## 2020-01-01 PROCEDURE — G8482 FLU IMMUNIZE ORDER/ADMIN: HCPCS | Performed by: PHYSICAL MEDICINE & REHABILITATION

## 2020-01-01 PROCEDURE — 2500000003 HC RX 250 WO HCPCS: Performed by: SURGERY

## 2020-01-01 PROCEDURE — 36556 INSERT NON-TUNNEL CV CATH: CPT | Performed by: SURGERY

## 2020-01-01 PROCEDURE — 82803 BLOOD GASES ANY COMBINATION: CPT

## 2020-01-01 PROCEDURE — 99292 CRITICAL CARE ADDL 30 MIN: CPT | Performed by: INTERNAL MEDICINE

## 2020-01-01 PROCEDURE — 31500 INSERT EMERGENCY AIRWAY: CPT

## 2020-01-01 PROCEDURE — 94150 VITAL CAPACITY TEST: CPT

## 2020-01-01 PROCEDURE — 6360000004 HC RX CONTRAST MEDICATION: Performed by: INTERNAL MEDICINE

## 2020-01-01 PROCEDURE — 83735 ASSAY OF MAGNESIUM: CPT

## 2020-01-01 PROCEDURE — 82947 ASSAY GLUCOSE BLOOD QUANT: CPT

## 2020-01-01 PROCEDURE — 74177 CT ABD & PELVIS W/CONTRAST: CPT

## 2020-01-01 PROCEDURE — 2500000003 HC RX 250 WO HCPCS: Performed by: INTERNAL MEDICINE

## 2020-01-01 PROCEDURE — 2580000003 HC RX 258: Performed by: SURGERY

## 2020-01-01 PROCEDURE — 93970 EXTREMITY STUDY: CPT

## 2020-01-01 PROCEDURE — P9035 PLATELET PHERES LEUKOREDUCED: HCPCS

## 2020-01-01 PROCEDURE — 0DTN0ZZ RESECTION OF SIGMOID COLON, OPEN APPROACH: ICD-10-PCS | Performed by: SURGERY

## 2020-01-01 PROCEDURE — 2580000003 HC RX 258: Performed by: EMERGENCY MEDICINE

## 2020-01-01 PROCEDURE — 3017F COLORECTAL CA SCREEN DOC REV: CPT | Performed by: PHYSICAL MEDICINE & REHABILITATION

## 2020-01-01 PROCEDURE — 6360000004 HC RX CONTRAST MEDICATION

## 2020-01-01 PROCEDURE — 81001 URINALYSIS AUTO W/SCOPE: CPT

## 2020-01-01 PROCEDURE — 36415 COLL VENOUS BLD VENIPUNCTURE: CPT

## 2020-01-01 PROCEDURE — 97110 THERAPEUTIC EXERCISES: CPT

## 2020-01-01 PROCEDURE — 97116 GAIT TRAINING THERAPY: CPT

## 2020-01-01 PROCEDURE — 82436 ASSAY OF URINE CHLORIDE: CPT

## 2020-01-01 PROCEDURE — 99222 1ST HOSP IP/OBS MODERATE 55: CPT | Performed by: SURGERY

## 2020-01-01 PROCEDURE — 94770 HC ETCO2 MONITOR DAILY: CPT

## 2020-01-01 PROCEDURE — 1123F ACP DISCUSS/DSCN MKR DOCD: CPT | Performed by: INTERNAL MEDICINE

## 2020-01-01 PROCEDURE — 2500000003 HC RX 250 WO HCPCS: Performed by: NURSE PRACTITIONER

## 2020-01-01 PROCEDURE — 80069 RENAL FUNCTION PANEL: CPT

## 2020-01-01 PROCEDURE — 71045 X-RAY EXAM CHEST 1 VIEW: CPT

## 2020-01-01 PROCEDURE — 1200000000 HC SEMI PRIVATE

## 2020-01-01 PROCEDURE — APPSS45 APP SPLIT SHARED TIME 31-45 MINUTES: Performed by: CLINICAL NURSE SPECIALIST

## 2020-01-01 PROCEDURE — 6370000000 HC RX 637 (ALT 250 FOR IP): Performed by: NURSE PRACTITIONER

## 2020-01-01 PROCEDURE — 80053 COMPREHEN METABOLIC PANEL: CPT

## 2020-01-01 PROCEDURE — 84100 ASSAY OF PHOSPHORUS: CPT

## 2020-01-01 PROCEDURE — 02HV33Z INSERTION OF INFUSION DEVICE INTO SUPERIOR VENA CAVA, PERCUTANEOUS APPROACH: ICD-10-PCS | Performed by: HOSPITALIST

## 2020-01-01 PROCEDURE — 96375 TX/PRO/DX INJ NEW DRUG ADDON: CPT

## 2020-01-01 PROCEDURE — 83010 ASSAY OF HAPTOGLOBIN QUANT: CPT

## 2020-01-01 PROCEDURE — 71260 CT THORAX DX C+: CPT

## 2020-01-01 PROCEDURE — 2060000000 HC ICU INTERMEDIATE R&B

## 2020-01-01 PROCEDURE — 6360000004 HC RX CONTRAST MEDICATION: Performed by: SURGERY

## 2020-01-01 PROCEDURE — 85384 FIBRINOGEN ACTIVITY: CPT

## 2020-01-01 PROCEDURE — 88309 TISSUE EXAM BY PATHOLOGIST: CPT

## 2020-01-01 PROCEDURE — 99232 SBSQ HOSP IP/OBS MODERATE 35: CPT | Performed by: SURGERY

## 2020-01-01 PROCEDURE — 84550 ASSAY OF BLOOD/URIC ACID: CPT

## 2020-01-01 PROCEDURE — 2500000003 HC RX 250 WO HCPCS

## 2020-01-01 PROCEDURE — G8420 CALC BMI NORM PARAMETERS: HCPCS | Performed by: INTERNAL MEDICINE

## 2020-01-01 PROCEDURE — 77080 DXA BONE DENSITY AXIAL: CPT

## 2020-01-01 PROCEDURE — 44143 PARTIAL REMOVAL OF COLON: CPT | Performed by: SURGERY

## 2020-01-01 PROCEDURE — 82330 ASSAY OF CALCIUM: CPT

## 2020-01-01 PROCEDURE — 97530 THERAPEUTIC ACTIVITIES: CPT

## 2020-01-01 PROCEDURE — 6360000002 HC RX W HCPCS

## 2020-01-01 PROCEDURE — 99213 OFFICE O/P EST LOW 20 MIN: CPT | Performed by: PHYSICAL MEDICINE & REHABILITATION

## 2020-01-01 PROCEDURE — 4040F PNEUMOC VAC/ADMIN/RCVD: CPT | Performed by: INTERNAL MEDICINE

## 2020-01-01 PROCEDURE — 85027 COMPLETE CBC AUTOMATED: CPT

## 2020-01-01 PROCEDURE — 36591 DRAW BLOOD OFF VENOUS DEVICE: CPT

## 2020-01-01 PROCEDURE — 37799 UNLISTED PX VASCULAR SURGERY: CPT

## 2020-01-01 PROCEDURE — 99024 POSTOP FOLLOW-UP VISIT: CPT | Performed by: SURGERY

## 2020-01-01 PROCEDURE — 84132 ASSAY OF SERUM POTASSIUM: CPT

## 2020-01-01 PROCEDURE — 6360000004 HC RX CONTRAST MEDICATION: Performed by: FAMILY MEDICINE

## 2020-01-01 PROCEDURE — 36430 TRANSFUSION BLD/BLD COMPNT: CPT

## 2020-01-01 PROCEDURE — 74176 CT ABD & PELVIS W/O CONTRAST: CPT

## 2020-01-01 PROCEDURE — 76937 US GUIDE VASCULAR ACCESS: CPT | Performed by: SURGERY

## 2020-01-01 PROCEDURE — 6370000000 HC RX 637 (ALT 250 FOR IP): Performed by: NURSE ANESTHETIST, CERTIFIED REGISTERED

## 2020-01-01 PROCEDURE — 86023 IMMUNOGLOBULIN ASSAY: CPT

## 2020-01-01 PROCEDURE — 2580000003 HC RX 258

## 2020-01-01 PROCEDURE — 82040 ASSAY OF SERUM ALBUMIN: CPT

## 2020-01-01 PROCEDURE — 6360000002 HC RX W HCPCS: Performed by: EMERGENCY MEDICINE

## 2020-01-01 PROCEDURE — 99214 OFFICE O/P EST MOD 30 MIN: CPT | Performed by: INTERNAL MEDICINE

## 2020-01-01 PROCEDURE — 96365 THER/PROPH/DIAG IV INF INIT: CPT

## 2020-01-01 PROCEDURE — 2580000003 HC RX 258: Performed by: NURSE ANESTHETIST, CERTIFIED REGISTERED

## 2020-01-01 PROCEDURE — 94003 VENT MGMT INPAT SUBQ DAY: CPT

## 2020-01-01 PROCEDURE — 36556 INSERT NON-TUNNEL CV CATH: CPT | Performed by: INTERNAL MEDICINE

## 2020-01-01 PROCEDURE — 31500 INSERT EMERGENCY AIRWAY: CPT | Performed by: INTERNAL MEDICINE

## 2020-01-01 PROCEDURE — 3023F SPIROM DOC REV: CPT | Performed by: INTERNAL MEDICINE

## 2020-01-01 PROCEDURE — 6360000002 HC RX W HCPCS: Performed by: NURSE PRACTITIONER

## 2020-01-01 PROCEDURE — 51798 US URINE CAPACITY MEASURE: CPT

## 2020-01-01 PROCEDURE — 3600000014 HC SURGERY LEVEL 4 ADDTL 15MIN: Performed by: SURGERY

## 2020-01-01 PROCEDURE — 97166 OT EVAL MOD COMPLEX 45 MIN: CPT

## 2020-01-01 PROCEDURE — 1123F ACP DISCUSS/DSCN MKR DOCD: CPT | Performed by: PHYSICAL MEDICINE & REHABILITATION

## 2020-01-01 PROCEDURE — 3700000000 HC ANESTHESIA ATTENDED CARE: Performed by: SURGERY

## 2020-01-01 PROCEDURE — 84295 ASSAY OF SERUM SODIUM: CPT

## 2020-01-01 PROCEDURE — 72148 MRI LUMBAR SPINE W/O DYE: CPT

## 2020-01-01 PROCEDURE — G8427 DOCREV CUR MEDS BY ELIG CLIN: HCPCS | Performed by: PHYSICAL MEDICINE & REHABILITATION

## 2020-01-01 PROCEDURE — P9016 RBC LEUKOCYTES REDUCED: HCPCS

## 2020-01-01 PROCEDURE — 70491 CT SOFT TISSUE NECK W/DYE: CPT

## 2020-01-01 PROCEDURE — 36592 COLLECT BLOOD FROM PICC: CPT

## 2020-01-01 PROCEDURE — 93971 EXTREMITY STUDY: CPT

## 2020-01-01 PROCEDURE — 2000000000 HC ICU R&B

## 2020-01-01 PROCEDURE — 83605 ASSAY OF LACTIC ACID: CPT

## 2020-01-01 PROCEDURE — 85730 THROMBOPLASTIN TIME PARTIAL: CPT

## 2020-01-01 PROCEDURE — 87324 CLOSTRIDIUM AG IA: CPT

## 2020-01-01 PROCEDURE — 86022 PLATELET ANTIBODIES: CPT

## 2020-01-01 PROCEDURE — 6370000000 HC RX 637 (ALT 250 FOR IP): Performed by: SURGERY

## 2020-01-01 PROCEDURE — 2709999900 FL UGI

## 2020-01-01 PROCEDURE — 86850 RBC ANTIBODY SCREEN: CPT

## 2020-01-01 PROCEDURE — G8926 SPIRO NO PERF OR DOC: HCPCS | Performed by: INTERNAL MEDICINE

## 2020-01-01 PROCEDURE — 3700000001 HC ADD 15 MINUTES (ANESTHESIA): Performed by: SURGERY

## 2020-01-01 PROCEDURE — 2709999900 HC NON-CHARGEABLE SUPPLY: Performed by: SURGERY

## 2020-01-01 PROCEDURE — 3600000004 HC SURGERY LEVEL 4 BASE: Performed by: SURGERY

## 2020-01-01 PROCEDURE — 94761 N-INVAS EAR/PLS OXIMETRY MLT: CPT

## 2020-01-01 PROCEDURE — 82550 ASSAY OF CK (CPK): CPT

## 2020-01-01 PROCEDURE — 93010 ELECTROCARDIOGRAM REPORT: CPT | Performed by: INTERNAL MEDICINE

## 2020-01-01 PROCEDURE — 84300 ASSAY OF URINE SODIUM: CPT

## 2020-01-01 PROCEDURE — G8420 CALC BMI NORM PARAMETERS: HCPCS | Performed by: PHYSICAL MEDICINE & REHABILITATION

## 2020-01-01 PROCEDURE — 88341 IMHCHEM/IMCYTCHM EA ADD ANTB: CPT

## 2020-01-01 PROCEDURE — 82570 ASSAY OF URINE CREATININE: CPT

## 2020-01-01 PROCEDURE — 0BH17EZ INSERTION OF ENDOTRACHEAL AIRWAY INTO TRACHEA, VIA NATURAL OR ARTIFICIAL OPENING: ICD-10-PCS | Performed by: INTERNAL MEDICINE

## 2020-01-01 PROCEDURE — 84478 ASSAY OF TRIGLYCERIDES: CPT

## 2020-01-01 PROCEDURE — 83690 ASSAY OF LIPASE: CPT

## 2020-01-01 PROCEDURE — 97535 SELF CARE MNGMENT TRAINING: CPT

## 2020-01-01 PROCEDURE — 4004F PT TOBACCO SCREEN RCVD TLK: CPT | Performed by: PHYSICAL MEDICINE & REHABILITATION

## 2020-01-01 PROCEDURE — 6370000000 HC RX 637 (ALT 250 FOR IP)

## 2020-01-01 PROCEDURE — 87449 NOS EACH ORGANISM AG IA: CPT

## 2020-01-01 PROCEDURE — 85397 CLOTTING FUNCT ACTIVITY: CPT

## 2020-01-01 PROCEDURE — 74022 RADEX COMPL AQT ABD SERIES: CPT

## 2020-01-01 PROCEDURE — 6360000002 HC RX W HCPCS: Performed by: NURSE ANESTHETIST, CERTIFIED REGISTERED

## 2020-01-01 PROCEDURE — 87040 BLOOD CULTURE FOR BACTERIA: CPT

## 2020-01-01 PROCEDURE — 0D1N0Z4 BYPASS SIGMOID COLON TO CUTANEOUS, OPEN APPROACH: ICD-10-PCS | Performed by: SURGERY

## 2020-01-01 PROCEDURE — 4040F PNEUMOC VAC/ADMIN/RCVD: CPT | Performed by: PHYSICAL MEDICINE & REHABILITATION

## 2020-01-01 PROCEDURE — C1751 CATH, INF, PER/CENT/MIDLINE: HCPCS

## 2020-01-01 PROCEDURE — 99231 SBSQ HOSP IP/OBS SF/LOW 25: CPT | Performed by: SURGERY

## 2020-01-01 PROCEDURE — 84484 ASSAY OF TROPONIN QUANT: CPT

## 2020-01-01 PROCEDURE — 86923 COMPATIBILITY TEST ELECTRIC: CPT

## 2020-01-01 PROCEDURE — 36573 INSJ PICC RS&I 5 YR+: CPT

## 2020-01-01 PROCEDURE — 90945 DIALYSIS ONE EVALUATION: CPT

## 2020-01-01 PROCEDURE — G8427 DOCREV CUR MEDS BY ELIG CLIN: HCPCS | Performed by: INTERNAL MEDICINE

## 2020-01-01 PROCEDURE — 05HM33Z INSERTION OF INFUSION DEVICE INTO RIGHT INTERNAL JUGULAR VEIN, PERCUTANEOUS APPROACH: ICD-10-PCS | Performed by: SURGERY

## 2020-01-01 PROCEDURE — 99291 CRITICAL CARE FIRST HOUR: CPT | Performed by: INTERNAL MEDICINE

## 2020-01-01 PROCEDURE — 02HV33Z INSERTION OF INFUSION DEVICE INTO SUPERIOR VENA CAVA, PERCUTANEOUS APPROACH: ICD-10-PCS | Performed by: RADIOLOGY

## 2020-01-01 PROCEDURE — 99204 OFFICE O/P NEW MOD 45 MIN: CPT | Performed by: PHYSICAL MEDICINE & REHABILITATION

## 2020-01-01 PROCEDURE — 3017F COLORECTAL CA SCREEN DOC REV: CPT | Performed by: INTERNAL MEDICINE

## 2020-01-01 PROCEDURE — G8482 FLU IMMUNIZE ORDER/ADMIN: HCPCS | Performed by: INTERNAL MEDICINE

## 2020-01-01 PROCEDURE — 97162 PT EVAL MOD COMPLEX 30 MIN: CPT

## 2020-01-01 PROCEDURE — 84133 ASSAY OF URINE POTASSIUM: CPT

## 2020-01-01 PROCEDURE — 86900 BLOOD TYPING SEROLOGIC ABO: CPT

## 2020-01-01 PROCEDURE — 99406 BEHAV CHNG SMOKING 3-10 MIN: CPT | Performed by: INTERNAL MEDICINE

## 2020-01-01 PROCEDURE — 6370000000 HC RX 637 (ALT 250 FOR IP): Performed by: FAMILY MEDICINE

## 2020-01-01 PROCEDURE — 5A1945Z RESPIRATORY VENTILATION, 24-96 CONSECUTIVE HOURS: ICD-10-PCS | Performed by: INTERNAL MEDICINE

## 2020-01-01 PROCEDURE — 6360000004 HC RX CONTRAST MEDICATION: Performed by: EMERGENCY MEDICINE

## 2020-01-01 PROCEDURE — 86901 BLOOD TYPING SEROLOGIC RH(D): CPT

## 2020-01-01 PROCEDURE — 2500000003 HC RX 250 WO HCPCS: Performed by: NURSE ANESTHETIST, CERTIFIED REGISTERED

## 2020-01-01 PROCEDURE — 85379 FIBRIN DEGRADATION QUANT: CPT

## 2020-01-01 PROCEDURE — 88342 IMHCHEM/IMCYTCHM 1ST ANTB: CPT

## 2020-01-01 PROCEDURE — 94002 VENT MGMT INPAT INIT DAY: CPT

## 2020-01-01 PROCEDURE — 4004F PT TOBACCO SCREEN RCVD TLK: CPT | Performed by: INTERNAL MEDICINE

## 2020-01-01 PROCEDURE — 85014 HEMATOCRIT: CPT

## 2020-01-01 PROCEDURE — 2580000003 HC RX 258: Performed by: NURSE PRACTITIONER

## 2020-01-01 PROCEDURE — 5A1D90Z PERFORMANCE OF URINARY FILTRATION, CONTINUOUS, GREATER THAN 18 HOURS PER DAY: ICD-10-PCS | Performed by: INTERNAL MEDICINE

## 2020-01-01 PROCEDURE — 85610 PROTHROMBIN TIME: CPT

## 2020-01-01 PROCEDURE — 93005 ELECTROCARDIOGRAM TRACING: CPT | Performed by: EMERGENCY MEDICINE

## 2020-01-01 PROCEDURE — 81003 URINALYSIS AUTO W/O SCOPE: CPT

## 2020-01-01 PROCEDURE — 2720000010 HC SURG SUPPLY STERILE: Performed by: SURGERY

## 2020-01-01 PROCEDURE — 1111F DSCHRG MED/CURRENT MED MERGE: CPT | Performed by: FAMILY MEDICINE

## 2020-01-01 RX ORDER — SODIUM CHLORIDE 9 MG/ML
INJECTION, SOLUTION INTRAVENOUS
Status: COMPLETED
Start: 2020-01-01 | End: 2020-01-01

## 2020-01-01 RX ORDER — OXYCODONE HYDROCHLORIDE AND ACETAMINOPHEN 5; 325 MG/1; MG/1
2 TABLET ORAL EVERY 4 HOURS PRN
Status: DISCONTINUED | OUTPATIENT
Start: 2020-01-01 | End: 2020-01-01 | Stop reason: HOSPADM

## 2020-01-01 RX ORDER — MEGESTROL ACETATE 20 MG/1
200 TABLET ORAL 2 TIMES DAILY
Status: DISCONTINUED | OUTPATIENT
Start: 2020-01-01 | End: 2020-01-01 | Stop reason: ALTCHOICE

## 2020-01-01 RX ORDER — ACETAMINOPHEN 650 MG/1
650 SUPPOSITORY RECTAL EVERY 6 HOURS PRN
Status: DISCONTINUED | OUTPATIENT
Start: 2020-01-01 | End: 2020-01-01 | Stop reason: HOSPADM

## 2020-01-01 RX ORDER — PROMETHAZINE HYDROCHLORIDE 25 MG/1
12.5 TABLET ORAL EVERY 6 HOURS PRN
Status: DISCONTINUED | OUTPATIENT
Start: 2020-01-01 | End: 2020-01-01 | Stop reason: HOSPADM

## 2020-01-01 RX ORDER — 0.9 % SODIUM CHLORIDE 0.9 %
20 INTRAVENOUS SOLUTION INTRAVENOUS ONCE
Status: DISCONTINUED | OUTPATIENT
Start: 2020-01-01 | End: 2020-01-01 | Stop reason: HOSPADM

## 2020-01-01 RX ORDER — SODIUM CHLORIDE 9 MG/ML
10 INJECTION INTRAVENOUS DAILY
Status: DISCONTINUED | OUTPATIENT
Start: 2020-01-01 | End: 2020-01-01 | Stop reason: SDUPTHER

## 2020-01-01 RX ORDER — TRAZODONE HYDROCHLORIDE 50 MG/1
100 TABLET ORAL NIGHTLY
Status: DISCONTINUED | OUTPATIENT
Start: 2020-01-01 | End: 2020-01-01 | Stop reason: HOSPADM

## 2020-01-01 RX ORDER — ALBUTEROL SULFATE 90 UG/1
AEROSOL, METERED RESPIRATORY (INHALATION) PRN
Status: DISCONTINUED | OUTPATIENT
Start: 2020-01-01 | End: 2020-01-01 | Stop reason: SDUPTHER

## 2020-01-01 RX ORDER — POTASSIUM CHLORIDE 7.45 MG/ML
10 INJECTION INTRAVENOUS PRN
Status: DISCONTINUED | OUTPATIENT
Start: 2020-01-01 | End: 2020-01-01 | Stop reason: HOSPADM

## 2020-01-01 RX ORDER — METHYLPREDNISOLONE SODIUM SUCCINATE 40 MG/ML
40 INJECTION, POWDER, LYOPHILIZED, FOR SOLUTION INTRAMUSCULAR; INTRAVENOUS EVERY 8 HOURS
Status: DISCONTINUED | OUTPATIENT
Start: 2020-01-01 | End: 2020-01-01

## 2020-01-01 RX ORDER — SODIUM CHLORIDE 0.9 % (FLUSH) 0.9 %
10 SYRINGE (ML) INJECTION EVERY 12 HOURS SCHEDULED
Status: DISCONTINUED | OUTPATIENT
Start: 2020-01-01 | End: 2020-01-01 | Stop reason: HOSPADM

## 2020-01-01 RX ORDER — DIAPER,BRIEF,INFANT-TODD,DISP
EACH MISCELLANEOUS 2 TIMES DAILY
Status: DISCONTINUED | OUTPATIENT
Start: 2020-01-01 | End: 2020-01-01

## 2020-01-01 RX ORDER — SODIUM CHLORIDE 9 MG/ML
INJECTION, SOLUTION INTRAVENOUS
Status: DISPENSED
Start: 2020-01-01 | End: 2020-01-01

## 2020-01-01 RX ORDER — SODIUM CHLORIDE 9 MG/ML
INJECTION, SOLUTION INTRAVENOUS CONTINUOUS
Status: DISCONTINUED | OUTPATIENT
Start: 2020-01-01 | End: 2020-01-01

## 2020-01-01 RX ORDER — DILTIAZEM HYDROCHLORIDE 5 MG/ML
10 INJECTION INTRAVENOUS ONCE
Status: COMPLETED | OUTPATIENT
Start: 2020-01-01 | End: 2020-01-01

## 2020-01-01 RX ORDER — SULFAMETHOXAZOLE AND TRIMETHOPRIM 800; 160 MG/1; MG/1
1 TABLET ORAL
Status: DISCONTINUED | OUTPATIENT
Start: 2020-01-01 | End: 2020-01-01

## 2020-01-01 RX ORDER — DEXTROSE AND SODIUM CHLORIDE 5; .45 G/100ML; G/100ML
INJECTION, SOLUTION INTRAVENOUS CONTINUOUS
Status: DISCONTINUED | OUTPATIENT
Start: 2020-01-01 | End: 2020-01-01

## 2020-01-01 RX ORDER — HYDROMORPHONE HCL 110MG/55ML
1 PATIENT CONTROLLED ANALGESIA SYRINGE INTRAVENOUS ONCE
Status: COMPLETED | OUTPATIENT
Start: 2020-01-01 | End: 2020-01-01

## 2020-01-01 RX ORDER — 0.9 % SODIUM CHLORIDE 0.9 %
250 INTRAVENOUS SOLUTION INTRAVENOUS ONCE
Status: COMPLETED | OUTPATIENT
Start: 2020-01-01 | End: 2020-01-01

## 2020-01-01 RX ORDER — MEGESTROL ACETATE 20 MG/1
40 TABLET ORAL 2 TIMES DAILY
Status: DISCONTINUED | OUTPATIENT
Start: 2020-01-01 | End: 2020-01-01

## 2020-01-01 RX ORDER — PANTOPRAZOLE SODIUM 20 MG/1
20 TABLET, DELAYED RELEASE ORAL DAILY
COMMUNITY

## 2020-01-01 RX ORDER — SODIUM CHLORIDE 0.9 % (FLUSH) 0.9 %
10 SYRINGE (ML) INJECTION PRN
Status: DISCONTINUED | OUTPATIENT
Start: 2020-01-01 | End: 2020-01-01 | Stop reason: HOSPADM

## 2020-01-01 RX ORDER — SODIUM CHLORIDE 9 MG/ML
INJECTION, SOLUTION INTRAVENOUS CONTINUOUS
Status: DISPENSED | OUTPATIENT
Start: 2020-01-01 | End: 2020-01-01

## 2020-01-01 RX ORDER — UMECLIDINIUM 62.5 UG/1
AEROSOL, POWDER ORAL
Qty: 1 EACH | Refills: 5 | Status: SHIPPED | OUTPATIENT
Start: 2020-01-01

## 2020-01-01 RX ORDER — MIDAZOLAM HYDROCHLORIDE 1 MG/ML
INJECTION INTRAMUSCULAR; INTRAVENOUS PRN
Status: DISCONTINUED | OUTPATIENT
Start: 2020-01-01 | End: 2020-01-01 | Stop reason: SDUPTHER

## 2020-01-01 RX ORDER — CALCIUM CHLORIDE 100 MG/ML
INJECTION INTRAVENOUS; INTRAVENTRICULAR PRN
Status: DISCONTINUED | OUTPATIENT
Start: 2020-01-01 | End: 2020-01-01 | Stop reason: SDUPTHER

## 2020-01-01 RX ORDER — FENTANYL CITRATE 50 UG/ML
INJECTION, SOLUTION INTRAMUSCULAR; INTRAVENOUS PRN
Status: DISCONTINUED | OUTPATIENT
Start: 2020-01-01 | End: 2020-01-01 | Stop reason: SDUPTHER

## 2020-01-01 RX ORDER — OXYCODONE HYDROCHLORIDE AND ACETAMINOPHEN 5; 325 MG/1; MG/1
1 TABLET ORAL EVERY 4 HOURS PRN
Status: DISCONTINUED | OUTPATIENT
Start: 2020-01-01 | End: 2020-01-01 | Stop reason: HOSPADM

## 2020-01-01 RX ORDER — CALCIUM GLUCONATE 20 MG/ML
1 INJECTION, SOLUTION INTRAVENOUS PRN
Status: DISCONTINUED | OUTPATIENT
Start: 2020-01-01 | End: 2020-01-01 | Stop reason: HOSPADM

## 2020-01-01 RX ORDER — PROPOFOL 10 MG/ML
10 INJECTION, EMULSION INTRAVENOUS
Status: DISCONTINUED | OUTPATIENT
Start: 2020-01-01 | End: 2020-01-01 | Stop reason: HOSPADM

## 2020-01-01 RX ORDER — PANTOPRAZOLE SODIUM 40 MG/10ML
40 INJECTION, POWDER, LYOPHILIZED, FOR SOLUTION INTRAVENOUS 2 TIMES DAILY
Status: DISCONTINUED | OUTPATIENT
Start: 2020-01-01 | End: 2020-01-01 | Stop reason: HOSPADM

## 2020-01-01 RX ORDER — MIDAZOLAM HYDROCHLORIDE 5 MG/ML
INJECTION INTRAMUSCULAR; INTRAVENOUS
Status: COMPLETED | OUTPATIENT
Start: 2020-01-01 | End: 2020-01-01

## 2020-01-01 RX ORDER — MAGNESIUM SULFATE 1 G/100ML
1 INJECTION INTRAVENOUS PRN
Status: DISCONTINUED | OUTPATIENT
Start: 2020-01-01 | End: 2020-01-01 | Stop reason: HOSPADM

## 2020-01-01 RX ORDER — ALBUTEROL SULFATE 90 UG/1
2 AEROSOL, METERED RESPIRATORY (INHALATION) EVERY 4 HOURS PRN
Status: DISCONTINUED | OUTPATIENT
Start: 2020-01-01 | End: 2020-01-01 | Stop reason: HOSPADM

## 2020-01-01 RX ORDER — SODIUM CHLORIDE, SODIUM LACTATE, POTASSIUM CHLORIDE, CALCIUM CHLORIDE 600; 310; 30; 20 MG/100ML; MG/100ML; MG/100ML; MG/100ML
INJECTION, SOLUTION INTRAVENOUS CONTINUOUS
Status: DISPENSED | OUTPATIENT
Start: 2020-01-01 | End: 2020-01-01

## 2020-01-01 RX ORDER — SODIUM CHLORIDE, SODIUM LACTATE, POTASSIUM CHLORIDE, CALCIUM CHLORIDE 600; 310; 30; 20 MG/100ML; MG/100ML; MG/100ML; MG/100ML
INJECTION, SOLUTION INTRAVENOUS CONTINUOUS PRN
Status: DISCONTINUED | OUTPATIENT
Start: 2020-01-01 | End: 2020-01-01 | Stop reason: SDUPTHER

## 2020-01-01 RX ORDER — MEGESTROL ACETATE 40 MG/1
40 TABLET ORAL 2 TIMES DAILY
Status: DISCONTINUED | OUTPATIENT
Start: 2020-01-01 | End: 2020-01-01 | Stop reason: SDUPTHER

## 2020-01-01 RX ORDER — ONDANSETRON 2 MG/ML
4 INJECTION INTRAMUSCULAR; INTRAVENOUS EVERY 6 HOURS PRN
Status: DISCONTINUED | OUTPATIENT
Start: 2020-01-01 | End: 2020-01-01 | Stop reason: HOSPADM

## 2020-01-01 RX ORDER — ALBUTEROL SULFATE 2.5 MG/3ML
2.5 SOLUTION RESPIRATORY (INHALATION) EVERY 6 HOURS PRN
Status: DISCONTINUED | OUTPATIENT
Start: 2020-01-01 | End: 2020-01-01 | Stop reason: HOSPADM

## 2020-01-01 RX ORDER — 0.9 % SODIUM CHLORIDE 0.9 %
30 INTRAVENOUS SOLUTION INTRAVENOUS ONCE
Status: COMPLETED | OUTPATIENT
Start: 2020-01-01 | End: 2020-01-01

## 2020-01-01 RX ORDER — HYDROXYZINE HYDROCHLORIDE 10 MG/1
25 TABLET, FILM COATED ORAL 2 TIMES DAILY PRN
Status: DISCONTINUED | OUTPATIENT
Start: 2020-01-01 | End: 2020-01-01 | Stop reason: HOSPADM

## 2020-01-01 RX ORDER — PANTOPRAZOLE SODIUM 40 MG/1
40 TABLET, DELAYED RELEASE ORAL
Qty: 30 TABLET | Refills: 3 | Status: SHIPPED | OUTPATIENT
Start: 2020-01-01 | End: 2020-01-01 | Stop reason: ALTCHOICE

## 2020-01-01 RX ORDER — MEGESTROL ACETATE 40 MG/ML
200 SUSPENSION ORAL 2 TIMES DAILY
Status: DISCONTINUED | OUTPATIENT
Start: 2020-01-01 | End: 2020-01-01 | Stop reason: HOSPADM

## 2020-01-01 RX ORDER — PREDNISONE 20 MG/1
20 TABLET ORAL DAILY
COMMUNITY

## 2020-01-01 RX ORDER — ONDANSETRON 2 MG/ML
INJECTION INTRAMUSCULAR; INTRAVENOUS PRN
Status: DISCONTINUED | OUTPATIENT
Start: 2020-01-01 | End: 2020-01-01 | Stop reason: SDUPTHER

## 2020-01-01 RX ORDER — HYDROMORPHONE HCL 110MG/55ML
0.5 PATIENT CONTROLLED ANALGESIA SYRINGE INTRAVENOUS EVERY 4 HOURS PRN
Status: DISCONTINUED | OUTPATIENT
Start: 2020-01-01 | End: 2020-01-01 | Stop reason: HOSPADM

## 2020-01-01 RX ORDER — DIPHENHYDRAMINE HCL 25 MG
25 TABLET ORAL EVERY 6 HOURS PRN
Status: DISCONTINUED | OUTPATIENT
Start: 2020-01-01 | End: 2020-01-01 | Stop reason: HOSPADM

## 2020-01-01 RX ORDER — ETOMIDATE 2 MG/ML
INJECTION INTRAVENOUS
Status: COMPLETED | OUTPATIENT
Start: 2020-01-01 | End: 2020-01-01

## 2020-01-01 RX ORDER — ACETAMINOPHEN 325 MG/1
650 TABLET ORAL EVERY 6 HOURS PRN
Status: DISCONTINUED | OUTPATIENT
Start: 2020-01-01 | End: 2020-01-01 | Stop reason: HOSPADM

## 2020-01-01 RX ORDER — PREDNISONE 20 MG/1
40 TABLET ORAL DAILY
Qty: 20 TABLET | Refills: 0 | Status: SHIPPED | OUTPATIENT
Start: 2020-01-01 | End: 2020-01-01

## 2020-01-01 RX ORDER — ROCURONIUM BROMIDE 10 MG/ML
INJECTION, SOLUTION INTRAVENOUS PRN
Status: DISCONTINUED | OUTPATIENT
Start: 2020-01-01 | End: 2020-01-01 | Stop reason: SDUPTHER

## 2020-01-01 RX ORDER — MORPHINE SULFATE 2 MG/ML
2 INJECTION, SOLUTION INTRAMUSCULAR; INTRAVENOUS
Status: DISCONTINUED | OUTPATIENT
Start: 2020-01-01 | End: 2020-01-01 | Stop reason: HOSPADM

## 2020-01-01 RX ORDER — ALBUTEROL SULFATE 90 UG/1
2 AEROSOL, METERED RESPIRATORY (INHALATION) EVERY 6 HOURS PRN
Status: DISCONTINUED | OUTPATIENT
Start: 2020-01-01 | End: 2020-01-01

## 2020-01-01 RX ORDER — LIDOCAINE HYDROCHLORIDE 10 MG/ML
5 INJECTION, SOLUTION INFILTRATION; PERINEURAL ONCE
Status: COMPLETED | OUTPATIENT
Start: 2020-01-01 | End: 2020-01-01

## 2020-01-01 RX ORDER — LIDOCAINE HYDROCHLORIDE 20 MG/ML
INJECTION, SOLUTION INFILTRATION; PERINEURAL PRN
Status: DISCONTINUED | OUTPATIENT
Start: 2020-01-01 | End: 2020-01-01 | Stop reason: SDUPTHER

## 2020-01-01 RX ORDER — PANTOPRAZOLE SODIUM 40 MG/10ML
40 INJECTION, POWDER, LYOPHILIZED, FOR SOLUTION INTRAVENOUS DAILY
Status: DISCONTINUED | OUTPATIENT
Start: 2020-01-01 | End: 2020-01-01 | Stop reason: SDUPTHER

## 2020-01-01 RX ORDER — DILTIAZEM HYDROCHLORIDE 120 MG/1
120 CAPSULE, COATED, EXTENDED RELEASE ORAL DAILY
Status: DISCONTINUED | OUTPATIENT
Start: 2020-01-01 | End: 2020-01-01 | Stop reason: HOSPADM

## 2020-01-01 RX ORDER — PROPOFOL 10 MG/ML
INJECTION, EMULSION INTRAVENOUS
Status: COMPLETED
Start: 2020-01-01 | End: 2020-01-01

## 2020-01-01 RX ORDER — PREDNISONE 20 MG/1
40 TABLET ORAL DAILY
Status: DISCONTINUED | OUTPATIENT
Start: 2020-01-01 | End: 2020-01-01 | Stop reason: HOSPADM

## 2020-01-01 RX ORDER — POLYETHYLENE GLYCOL 3350 17 G/17G
17 POWDER, FOR SOLUTION ORAL DAILY PRN
Status: DISCONTINUED | OUTPATIENT
Start: 2020-01-01 | End: 2020-01-01 | Stop reason: HOSPADM

## 2020-01-01 RX ORDER — POTASSIUM CHLORIDE 750 MG/1
10 TABLET, EXTENDED RELEASE ORAL 3 TIMES DAILY
Status: DISCONTINUED | OUTPATIENT
Start: 2020-01-01 | End: 2020-01-01

## 2020-01-01 RX ORDER — METHYLPREDNISOLONE SODIUM SUCCINATE 40 MG/ML
30 INJECTION, POWDER, LYOPHILIZED, FOR SOLUTION INTRAMUSCULAR; INTRAVENOUS EVERY 12 HOURS
Status: DISCONTINUED | OUTPATIENT
Start: 2020-01-01 | End: 2020-01-01 | Stop reason: HOSPADM

## 2020-01-01 RX ORDER — MAGNESIUM SULFATE IN WATER 40 MG/ML
2 INJECTION, SOLUTION INTRAVENOUS ONCE
Status: COMPLETED | OUTPATIENT
Start: 2020-01-01 | End: 2020-01-01

## 2020-01-01 RX ORDER — TAMSULOSIN HYDROCHLORIDE 0.4 MG/1
0.4 CAPSULE ORAL DAILY
Status: DISCONTINUED | OUTPATIENT
Start: 2020-01-01 | End: 2020-01-01 | Stop reason: HOSPADM

## 2020-01-01 RX ORDER — SIMETHICONE 80 MG
80 TABLET,CHEWABLE ORAL EVERY 6 HOURS PRN
Status: DISCONTINUED | OUTPATIENT
Start: 2020-01-01 | End: 2020-01-01 | Stop reason: HOSPADM

## 2020-01-01 RX ORDER — FENTANYL CITRATE 50 UG/ML
50 INJECTION, SOLUTION INTRAMUSCULAR; INTRAVENOUS EVERY 30 MIN PRN
Status: DISCONTINUED | OUTPATIENT
Start: 2020-01-01 | End: 2020-01-01

## 2020-01-01 RX ORDER — MAGNESIUM HYDROXIDE 1200 MG/15ML
LIQUID ORAL CONTINUOUS PRN
Status: COMPLETED | OUTPATIENT
Start: 2020-01-01 | End: 2020-01-01

## 2020-01-01 RX ORDER — PANTOPRAZOLE SODIUM 20 MG/1
20 TABLET, DELAYED RELEASE ORAL
Status: DISCONTINUED | OUTPATIENT
Start: 2020-01-01 | End: 2020-01-01

## 2020-01-01 RX ORDER — 0.9 % SODIUM CHLORIDE 0.9 %
20 INTRAVENOUS SOLUTION INTRAVENOUS ONCE
Status: COMPLETED | OUTPATIENT
Start: 2020-01-01 | End: 2020-01-01

## 2020-01-01 RX ORDER — LORAZEPAM 2 MG/ML
0.5 INJECTION INTRAMUSCULAR
Status: DISCONTINUED | OUTPATIENT
Start: 2020-01-01 | End: 2020-01-01 | Stop reason: HOSPADM

## 2020-01-01 RX ORDER — MAGNESIUM SULFATE IN WATER 40 MG/ML
4 INJECTION, SOLUTION INTRAVENOUS ONCE
Status: COMPLETED | OUTPATIENT
Start: 2020-01-01 | End: 2020-01-01

## 2020-01-01 RX ORDER — METHYLPREDNISOLONE SODIUM SUCCINATE 125 MG/2ML
125 INJECTION, POWDER, LYOPHILIZED, FOR SOLUTION INTRAMUSCULAR; INTRAVENOUS ONCE
Status: COMPLETED | OUTPATIENT
Start: 2020-01-01 | End: 2020-01-01

## 2020-01-01 RX ORDER — 0.9 % SODIUM CHLORIDE 0.9 %
1000 INTRAVENOUS SOLUTION INTRAVENOUS ONCE
Status: COMPLETED | OUTPATIENT
Start: 2020-01-01 | End: 2020-01-01

## 2020-01-01 RX ORDER — SODIUM CHLORIDE 9 MG/ML
INJECTION, SOLUTION INTRAVENOUS CONTINUOUS PRN
Status: DISCONTINUED | OUTPATIENT
Start: 2020-01-01 | End: 2020-01-01 | Stop reason: SDUPTHER

## 2020-01-01 RX ORDER — MEGESTROL ACETATE 40 MG/1
200 TABLET ORAL 2 TIMES DAILY
Status: CANCELLED | OUTPATIENT
Start: 2020-01-01

## 2020-01-01 RX ORDER — PANTOPRAZOLE SODIUM 40 MG/1
40 TABLET, DELAYED RELEASE ORAL
Status: DISCONTINUED | OUTPATIENT
Start: 2020-01-01 | End: 2020-01-01 | Stop reason: HOSPADM

## 2020-01-01 RX ORDER — SODIUM CHLORIDE 9 MG/ML
INJECTION, SOLUTION INTRAVENOUS
Status: DISCONTINUED
Start: 2020-01-01 | End: 2020-01-01 | Stop reason: HOSPADM

## 2020-01-01 RX ADMIN — PIPERACILLIN AND TAZOBACTAM 3.38 G: 3; .375 INJECTION, POWDER, LYOPHILIZED, FOR SOLUTION INTRAVENOUS at 16:22

## 2020-01-01 RX ADMIN — Medication 5 PUFF: at 16:24

## 2020-01-01 RX ADMIN — SODIUM ZIRCONIUM CYCLOSILICATE 10 G: 10 POWDER, FOR SUSPENSION ORAL at 16:23

## 2020-01-01 RX ADMIN — PIPERACILLIN AND TAZOBACTAM 3.38 G: 3; .375 INJECTION, POWDER, LYOPHILIZED, FOR SOLUTION INTRAVENOUS at 08:25

## 2020-01-01 RX ADMIN — Medication 10 ML: at 08:29

## 2020-01-01 RX ADMIN — METHYLPREDNISOLONE SODIUM SUCCINATE 40 MG: 40 INJECTION, POWDER, FOR SOLUTION INTRAMUSCULAR; INTRAVENOUS at 23:14

## 2020-01-01 RX ADMIN — TAMSULOSIN HYDROCHLORIDE 0.4 MG: 0.4 CAPSULE ORAL at 08:20

## 2020-01-01 RX ADMIN — Medication: at 12:04

## 2020-01-01 RX ADMIN — Medication 10 ML: at 21:18

## 2020-01-01 RX ADMIN — Medication 10 ML: at 09:00

## 2020-01-01 RX ADMIN — VASOPRESSIN 0.04 UNITS/MIN: 20 INJECTION INTRAVENOUS at 16:07

## 2020-01-01 RX ADMIN — PHENYLEPHRINE HYDROCHLORIDE 300 MCG/MIN: 10 INJECTION INTRAVENOUS at 15:53

## 2020-01-01 RX ADMIN — METHYLPREDNISOLONE SODIUM SUCCINATE 30 MG: 40 INJECTION, POWDER, FOR SOLUTION INTRAMUSCULAR; INTRAVENOUS at 06:18

## 2020-01-01 RX ADMIN — MEGESTROL ACETATE 200 MG: 40 SUSPENSION ORAL at 20:41

## 2020-01-01 RX ADMIN — PIPERACILLIN AND TAZOBACTAM 3.38 G: 3; .375 INJECTION, POWDER, LYOPHILIZED, FOR SOLUTION INTRAVENOUS at 00:22

## 2020-01-01 RX ADMIN — ONDANSETRON 4 MG: 2 INJECTION INTRAMUSCULAR; INTRAVENOUS at 05:40

## 2020-01-01 RX ADMIN — METHYLPREDNISOLONE SODIUM SUCCINATE 40 MG: 40 INJECTION, POWDER, FOR SOLUTION INTRAMUSCULAR; INTRAVENOUS at 10:56

## 2020-01-01 RX ADMIN — PANTOPRAZOLE SODIUM 40 MG: 40 INJECTION, POWDER, FOR SOLUTION INTRAVENOUS at 08:15

## 2020-01-01 RX ADMIN — POTASSIUM CHLORIDE 10 MEQ: 750 TABLET, EXTENDED RELEASE ORAL at 21:18

## 2020-01-01 RX ADMIN — METHYLPREDNISOLONE SODIUM SUCCINATE 40 MG: 40 INJECTION, POWDER, FOR SOLUTION INTRAMUSCULAR; INTRAVENOUS at 07:57

## 2020-01-01 RX ADMIN — POTASSIUM CHLORIDE 10 MEQ: 7.46 INJECTION, SOLUTION INTRAVENOUS at 09:45

## 2020-01-01 RX ADMIN — PHENYLEPHRINE HYDROCHLORIDE 300 MCG/MIN: 10 INJECTION INTRAVENOUS at 12:25

## 2020-01-01 RX ADMIN — SODIUM BICARBONATE: 84 INJECTION, SOLUTION INTRAVENOUS at 18:57

## 2020-01-01 RX ADMIN — METHYLPREDNISOLONE SODIUM SUCCINATE 30 MG: 40 INJECTION, POWDER, FOR SOLUTION INTRAMUSCULAR; INTRAVENOUS at 18:08

## 2020-01-01 RX ADMIN — WHITE PETROLATUM 41 % TOPICAL OINTMENT: OINTMENT at 11:52

## 2020-01-01 RX ADMIN — MEGESTROL ACETATE 200 MG: 40 SUSPENSION ORAL at 09:05

## 2020-01-01 RX ADMIN — NOREPINEPHRINE BITARTRATE 50 MCG/MIN: 1 INJECTION, SOLUTION, CONCENTRATE INTRAVENOUS at 22:09

## 2020-01-01 RX ADMIN — MIDAZOLAM HYDROCHLORIDE 5 MG: 2 INJECTION, SOLUTION INTRAMUSCULAR; INTRAVENOUS at 15:51

## 2020-01-01 RX ADMIN — TIOTROPIUM BROMIDE INHALATION SPRAY 2 PUFF: 3.12 SPRAY, METERED RESPIRATORY (INHALATION) at 07:26

## 2020-01-01 RX ADMIN — SODIUM CHLORIDE 250 ML: 9 INJECTION, SOLUTION INTRAVENOUS at 01:38

## 2020-01-01 RX ADMIN — MEROPENEM 1 G: 1 INJECTION, POWDER, FOR SOLUTION INTRAVENOUS at 00:18

## 2020-01-01 RX ADMIN — ETOMIDATE 10 MG: 2 INJECTION INTRAVENOUS at 15:23

## 2020-01-01 RX ADMIN — METHYLPREDNISOLONE SODIUM SUCCINATE 40 MG: 40 INJECTION, POWDER, FOR SOLUTION INTRAMUSCULAR; INTRAVENOUS at 06:10

## 2020-01-01 RX ADMIN — METHYLPREDNISOLONE SODIUM SUCCINATE 40 MG: 40 INJECTION, POWDER, FOR SOLUTION INTRAMUSCULAR; INTRAVENOUS at 22:32

## 2020-01-01 RX ADMIN — EPINEPHRINE 30 MCG/MIN: 1 INJECTION PARENTERAL at 00:45

## 2020-01-01 RX ADMIN — METHYLPREDNISOLONE SODIUM SUCCINATE 30 MG: 40 INJECTION, POWDER, FOR SOLUTION INTRAMUSCULAR; INTRAVENOUS at 17:10

## 2020-01-01 RX ADMIN — IOHEXOL 50 ML: 240 INJECTION, SOLUTION INTRATHECAL; INTRAVASCULAR; INTRAVENOUS; ORAL at 15:19

## 2020-01-01 RX ADMIN — SODIUM BICARBONATE: 84 INJECTION, SOLUTION INTRAVENOUS at 11:00

## 2020-01-01 RX ADMIN — NOREPINEPHRINE BITARTRATE 50 MCG/MIN: 1 INJECTION, SOLUTION, CONCENTRATE INTRAVENOUS at 18:55

## 2020-01-01 RX ADMIN — PANTOPRAZOLE SODIUM 40 MG: 40 INJECTION, POWDER, FOR SOLUTION INTRAVENOUS at 11:14

## 2020-01-01 RX ADMIN — PROPOFOL 10 MCG/KG/MIN: 10 INJECTION, EMULSION INTRAVENOUS at 15:40

## 2020-01-01 RX ADMIN — PIPERACILLIN AND TAZOBACTAM 3.38 G: 3; .375 INJECTION, POWDER, LYOPHILIZED, FOR SOLUTION INTRAVENOUS at 13:14

## 2020-01-01 RX ADMIN — Medication 10 ML: at 08:01

## 2020-01-01 RX ADMIN — MORPHINE SULFATE 2 MG: 2 INJECTION, SOLUTION INTRAMUSCULAR; INTRAVENOUS at 11:48

## 2020-01-01 RX ADMIN — SODIUM CHLORIDE: 9 INJECTION, SOLUTION INTRAVENOUS at 16:14

## 2020-01-01 RX ADMIN — MEGESTROL ACETATE 200 MG: 40 SUSPENSION ORAL at 00:30

## 2020-01-01 RX ADMIN — ASCORBIC ACID, VITAMIN A PALMITATE, CHOLECALCIFEROL, THIAMINE HYDROCHLORIDE, RIBOFLAVIN-5 PHOSPHATE SODIUM, PYRIDOXINE HYDROCHLORIDE, NIACINAMIDE, DEXPANTHENOL, ALPHA-TOCOPHEROL ACETATE, VITAMIN K1, FOLIC ACID, BIOTIN, CYANOCOBALAMIN: 200; 3300; 200; 6; 3.6; 6; 40; 15; 10; 150; 600; 60; 5 INJECTION, SOLUTION INTRAVENOUS at 20:00

## 2020-01-01 RX ADMIN — Medication 10 ML: at 09:14

## 2020-01-01 RX ADMIN — METHYLPREDNISOLONE SODIUM SUCCINATE 30 MG: 40 INJECTION, POWDER, FOR SOLUTION INTRAMUSCULAR; INTRAVENOUS at 18:00

## 2020-01-01 RX ADMIN — ENOXAPARIN SODIUM 40 MG: 40 INJECTION SUBCUTANEOUS at 08:55

## 2020-01-01 RX ADMIN — POTASSIUM CHLORIDE 10 MEQ: 750 TABLET, EXTENDED RELEASE ORAL at 14:02

## 2020-01-01 RX ADMIN — DILTIAZEM HYDROCHLORIDE 120 MG: 120 CAPSULE, COATED, EXTENDED RELEASE ORAL at 10:13

## 2020-01-01 RX ADMIN — Medication 10 ML: at 21:05

## 2020-01-01 RX ADMIN — METHYLPREDNISOLONE SODIUM SUCCINATE 30 MG: 40 INJECTION, POWDER, FOR SOLUTION INTRAMUSCULAR; INTRAVENOUS at 06:17

## 2020-01-01 RX ADMIN — PIPERACILLIN AND TAZOBACTAM 3.38 G: 3; .375 INJECTION, POWDER, LYOPHILIZED, FOR SOLUTION INTRAVENOUS at 13:42

## 2020-01-01 RX ADMIN — SODIUM CHLORIDE: 9 INJECTION, SOLUTION INTRAVENOUS at 11:58

## 2020-01-01 RX ADMIN — METHYLPREDNISOLONE SODIUM SUCCINATE 40 MG: 40 INJECTION, POWDER, FOR SOLUTION INTRAMUSCULAR; INTRAVENOUS at 22:42

## 2020-01-01 RX ADMIN — HYDROCORTISONE: 1 CREAM TOPICAL at 23:45

## 2020-01-01 RX ADMIN — DILTIAZEM HYDROCHLORIDE 10 MG: 5 INJECTION INTRAVENOUS at 21:50

## 2020-01-01 RX ADMIN — ENOXAPARIN SODIUM 40 MG: 40 INJECTION SUBCUTANEOUS at 09:34

## 2020-01-01 RX ADMIN — ROCURONIUM BROMIDE 50 MG: 10 SOLUTION INTRAVENOUS at 15:58

## 2020-01-01 RX ADMIN — PIPERACILLIN AND TAZOBACTAM 3.38 G: 3; .375 INJECTION, POWDER, LYOPHILIZED, FOR SOLUTION INTRAVENOUS at 06:19

## 2020-01-01 RX ADMIN — DILTIAZEM HYDROCHLORIDE 120 MG: 120 CAPSULE, COATED, EXTENDED RELEASE ORAL at 09:05

## 2020-01-01 RX ADMIN — HYDROMORPHONE HYDROCHLORIDE 0.5 MG: 2 INJECTION, SOLUTION INTRAMUSCULAR; INTRAVENOUS; SUBCUTANEOUS at 06:47

## 2020-01-01 RX ADMIN — CALCIUM CHLORIDE 0.5 G: 100 INJECTION, SOLUTION INTRAVENOUS at 16:26

## 2020-01-01 RX ADMIN — Medication 10 ML: at 21:16

## 2020-01-01 RX ADMIN — PIPERACILLIN AND TAZOBACTAM 3.38 G: 3; .375 INJECTION, POWDER, LYOPHILIZED, FOR SOLUTION INTRAVENOUS at 06:24

## 2020-01-01 RX ADMIN — ONDANSETRON 4 MG: 2 INJECTION INTRAMUSCULAR; INTRAVENOUS at 16:40

## 2020-01-01 RX ADMIN — HYDROMORPHONE HYDROCHLORIDE 0.5 MG: 2 INJECTION, SOLUTION INTRAMUSCULAR; INTRAVENOUS; SUBCUTANEOUS at 08:11

## 2020-01-01 RX ADMIN — PIPERACILLIN AND TAZOBACTAM 3.38 G: 3; .375 INJECTION, POWDER, LYOPHILIZED, FOR SOLUTION INTRAVENOUS at 18:38

## 2020-01-01 RX ADMIN — PHENYLEPHRINE HYDROCHLORIDE 300 MCG/MIN: 10 INJECTION INTRAVENOUS at 10:31

## 2020-01-01 RX ADMIN — METHYLPREDNISOLONE SODIUM SUCCINATE 30 MG: 40 INJECTION, POWDER, FOR SOLUTION INTRAMUSCULAR; INTRAVENOUS at 06:33

## 2020-01-01 RX ADMIN — Medication 5 PUFF: at 16:14

## 2020-01-01 RX ADMIN — SODIUM CHLORIDE: 9 INJECTION, SOLUTION INTRAVENOUS at 22:43

## 2020-01-01 RX ADMIN — ENOXAPARIN SODIUM 40 MG: 40 INJECTION SUBCUTANEOUS at 08:30

## 2020-01-01 RX ADMIN — PHENYLEPHRINE HYDROCHLORIDE 200 MCG/MIN: 10 INJECTION INTRAVENOUS at 05:31

## 2020-01-01 RX ADMIN — TIOTROPIUM BROMIDE INHALATION SPRAY 2 PUFF: 3.12 SPRAY, METERED RESPIRATORY (INHALATION) at 07:10

## 2020-01-01 RX ADMIN — HYDROMORPHONE HYDROCHLORIDE 0.5 MG: 2 INJECTION, SOLUTION INTRAMUSCULAR; INTRAVENOUS; SUBCUTANEOUS at 18:27

## 2020-01-01 RX ADMIN — MEGESTROL ACETATE 200 MG: 40 SUSPENSION ORAL at 20:00

## 2020-01-01 RX ADMIN — SODIUM CHLORIDE: 9 INJECTION, SOLUTION INTRAVENOUS at 02:02

## 2020-01-01 RX ADMIN — Medication: at 21:06

## 2020-01-01 RX ADMIN — NOREPINEPHRINE BITARTRATE 50 MCG/MIN: 1 INJECTION, SOLUTION, CONCENTRATE INTRAVENOUS at 04:18

## 2020-01-01 RX ADMIN — MEGESTROL ACETATE 200 MG: 40 SUSPENSION ORAL at 09:27

## 2020-01-01 RX ADMIN — SODIUM BICARBONATE 50 MEQ: 84 INJECTION INTRAVENOUS at 00:14

## 2020-01-01 RX ADMIN — Medication 500 ML/HR: at 13:02

## 2020-01-01 RX ADMIN — IOPAMIDOL 75 ML: 755 INJECTION, SOLUTION INTRAVENOUS at 17:53

## 2020-01-01 RX ADMIN — SODIUM CHLORIDE: 9 INJECTION, SOLUTION INTRAVENOUS at 13:44

## 2020-01-01 RX ADMIN — POTASSIUM CHLORIDE 10 MEQ: 750 TABLET, EXTENDED RELEASE ORAL at 20:47

## 2020-01-01 RX ADMIN — POTASSIUM CHLORIDE 10 MEQ: 7.46 INJECTION, SOLUTION INTRAVENOUS at 10:39

## 2020-01-01 RX ADMIN — SODIUM CHLORIDE 8 MG/HR: 9 INJECTION, SOLUTION INTRAVENOUS at 05:24

## 2020-01-01 RX ADMIN — METHYLPREDNISOLONE SODIUM SUCCINATE 40 MG: 40 INJECTION, POWDER, FOR SOLUTION INTRAMUSCULAR; INTRAVENOUS at 16:48

## 2020-01-01 RX ADMIN — HYDROMORPHONE HYDROCHLORIDE 0.5 MG: 2 INJECTION, SOLUTION INTRAMUSCULAR; INTRAVENOUS; SUBCUTANEOUS at 13:14

## 2020-01-01 RX ADMIN — Medication 10 ML: at 20:57

## 2020-01-01 RX ADMIN — EPINEPHRINE 30 MCG/MIN: 1 INJECTION PARENTERAL at 23:43

## 2020-01-01 RX ADMIN — Medication 10 ML: at 19:59

## 2020-01-01 RX ADMIN — MEROPENEM 1 G: 1 INJECTION, POWDER, FOR SOLUTION INTRAVENOUS at 08:20

## 2020-01-01 RX ADMIN — Medication 10 ML: at 18:08

## 2020-01-01 RX ADMIN — PIPERACILLIN AND TAZOBACTAM 3.38 G: 3; .375 INJECTION, POWDER, LYOPHILIZED, FOR SOLUTION INTRAVENOUS at 03:53

## 2020-01-01 RX ADMIN — MEGESTROL ACETATE 200 MG: 40 SUSPENSION ORAL at 20:09

## 2020-01-01 RX ADMIN — DILTIAZEM HYDROCHLORIDE 120 MG: 120 CAPSULE, COATED, EXTENDED RELEASE ORAL at 09:28

## 2020-01-01 RX ADMIN — FENTANYL CITRATE 25 MCG/HR: 50 INJECTION, SOLUTION INTRAMUSCULAR; INTRAVENOUS at 00:51

## 2020-01-01 RX ADMIN — Medication 10 ML: at 20:00

## 2020-01-01 RX ADMIN — WHITE PETROLATUM 41 % TOPICAL OINTMENT: OINTMENT at 08:30

## 2020-01-01 RX ADMIN — CALCIUM GLUCONATE: 98 INJECTION, SOLUTION INTRAVENOUS at 17:40

## 2020-01-01 RX ADMIN — IOPAMIDOL 75 ML: 755 INJECTION, SOLUTION INTRAVENOUS at 14:06

## 2020-01-01 RX ADMIN — PANTOPRAZOLE SODIUM 40 MG: 40 INJECTION, POWDER, FOR SOLUTION INTRAVENOUS at 09:14

## 2020-01-01 RX ADMIN — Medication 10 ML: at 20:10

## 2020-01-01 RX ADMIN — METHYLPREDNISOLONE SODIUM SUCCINATE 40 MG: 40 INJECTION, POWDER, FOR SOLUTION INTRAMUSCULAR; INTRAVENOUS at 06:07

## 2020-01-01 RX ADMIN — TIOTROPIUM BROMIDE INHALATION SPRAY 2 PUFF: 3.12 SPRAY, METERED RESPIRATORY (INHALATION) at 08:27

## 2020-01-01 RX ADMIN — WHITE PETROLATUM 41 % TOPICAL OINTMENT: OINTMENT at 18:45

## 2020-01-01 RX ADMIN — PANTOPRAZOLE SODIUM 40 MG: 40 INJECTION, POWDER, FOR SOLUTION INTRAVENOUS at 09:12

## 2020-01-01 RX ADMIN — METHYLPREDNISOLONE SODIUM SUCCINATE 40 MG: 40 INJECTION, POWDER, FOR SOLUTION INTRAMUSCULAR; INTRAVENOUS at 06:33

## 2020-01-01 RX ADMIN — TIOTROPIUM BROMIDE INHALATION SPRAY 2 PUFF: 3.12 SPRAY, METERED RESPIRATORY (INHALATION) at 07:56

## 2020-01-01 RX ADMIN — TRAZODONE HYDROCHLORIDE 100 MG: 50 TABLET ORAL at 23:23

## 2020-01-01 RX ADMIN — MEROPENEM 1 G: 1 INJECTION, POWDER, FOR SOLUTION INTRAVENOUS at 08:31

## 2020-01-01 RX ADMIN — HYDROMORPHONE HYDROCHLORIDE 0.5 MG: 2 INJECTION, SOLUTION INTRAMUSCULAR; INTRAVENOUS; SUBCUTANEOUS at 10:07

## 2020-01-01 RX ADMIN — IOHEXOL 50 ML: 240 INJECTION, SOLUTION INTRATHECAL; INTRAVASCULAR; INTRAVENOUS; ORAL at 10:17

## 2020-01-01 RX ADMIN — LIDOCAINE HYDROCHLORIDE 2 ML: 10 INJECTION, SOLUTION EPIDURAL; INFILTRATION; INTRACAUDAL; PERINEURAL at 11:24

## 2020-01-01 RX ADMIN — Medication 10 ML: at 19:52

## 2020-01-01 RX ADMIN — Medication 1000 ML/HR: at 21:55

## 2020-01-01 RX ADMIN — PIPERACILLIN AND TAZOBACTAM 3.38 G: 3; .375 INJECTION, POWDER, LYOPHILIZED, FOR SOLUTION INTRAVENOUS at 19:59

## 2020-01-01 RX ADMIN — ACETAMINOPHEN 650 MG: 325 TABLET ORAL at 08:31

## 2020-01-01 RX ADMIN — HYDROMORPHONE HYDROCHLORIDE 0.5 MG: 2 INJECTION, SOLUTION INTRAMUSCULAR; INTRAVENOUS; SUBCUTANEOUS at 02:07

## 2020-01-01 RX ADMIN — MEROPENEM 1 G: 1 INJECTION, POWDER, FOR SOLUTION INTRAVENOUS at 23:40

## 2020-01-01 RX ADMIN — PIPERACILLIN AND TAZOBACTAM 3.38 G: 3; .375 INJECTION, POWDER, LYOPHILIZED, FOR SOLUTION INTRAVENOUS at 12:40

## 2020-01-01 RX ADMIN — PANTOPRAZOLE SODIUM 40 MG: 40 INJECTION, POWDER, FOR SOLUTION INTRAVENOUS at 08:20

## 2020-01-01 RX ADMIN — METHYLPREDNISOLONE SODIUM SUCCINATE 30 MG: 40 INJECTION, POWDER, FOR SOLUTION INTRAMUSCULAR; INTRAVENOUS at 06:08

## 2020-01-01 RX ADMIN — LORAZEPAM 0.5 MG: 2 INJECTION, SOLUTION INTRAMUSCULAR; INTRAVENOUS at 11:48

## 2020-01-01 RX ADMIN — METHYLPREDNISOLONE SODIUM SUCCINATE 40 MG: 40 INJECTION, POWDER, FOR SOLUTION INTRAMUSCULAR; INTRAVENOUS at 14:26

## 2020-01-01 RX ADMIN — IOPAMIDOL 75 ML: 755 INJECTION, SOLUTION INTRAVENOUS at 10:17

## 2020-01-01 RX ADMIN — ENOXAPARIN SODIUM 40 MG: 40 INJECTION SUBCUTANEOUS at 09:19

## 2020-01-01 RX ADMIN — SULFAMETHOXAZOLE AND TRIMETHOPRIM 1 TABLET: 800; 160 TABLET ORAL at 09:18

## 2020-01-01 RX ADMIN — ASCORBIC ACID, VITAMIN A PALMITATE, CHOLECALCIFEROL, THIAMINE HYDROCHLORIDE, RIBOFLAVIN-5 PHOSPHATE SODIUM, PYRIDOXINE HYDROCHLORIDE, NIACINAMIDE, DEXPANTHENOL, ALPHA-TOCOPHEROL ACETATE, VITAMIN K1, FOLIC ACID, BIOTIN, CYANOCOBALAMIN: 200; 3300; 200; 6; 3.6; 6; 40; 15; 10; 150; 600; 60; 5 INJECTION, SOLUTION INTRAVENOUS at 20:38

## 2020-01-01 RX ADMIN — IOPAMIDOL 75 ML: 755 INJECTION, SOLUTION INTRAVENOUS at 10:19

## 2020-01-01 RX ADMIN — Medication: at 13:55

## 2020-01-01 RX ADMIN — VASOPRESSIN 0.04 UNITS/MIN: 20 INJECTION INTRAVENOUS at 00:45

## 2020-01-01 RX ADMIN — EPINEPHRINE 100 MCG/MIN: 1 INJECTION PARENTERAL at 10:47

## 2020-01-01 RX ADMIN — PHENYLEPHRINE HYDROCHLORIDE 300 MCG/MIN: 10 INJECTION INTRAVENOUS at 04:11

## 2020-01-01 RX ADMIN — HYDROMORPHONE HYDROCHLORIDE 0.5 MG: 2 INJECTION, SOLUTION INTRAMUSCULAR; INTRAVENOUS; SUBCUTANEOUS at 04:10

## 2020-01-01 RX ADMIN — PHENYLEPHRINE HYDROCHLORIDE 300 MCG/MIN: 10 INJECTION INTRAVENOUS at 22:05

## 2020-01-01 RX ADMIN — PIPERACILLIN AND TAZOBACTAM 3.38 G: 3; .375 INJECTION, POWDER, LYOPHILIZED, FOR SOLUTION INTRAVENOUS at 12:19

## 2020-01-01 RX ADMIN — TIOTROPIUM BROMIDE INHALATION SPRAY 2 PUFF: 3.12 SPRAY, METERED RESPIRATORY (INHALATION) at 08:24

## 2020-01-01 RX ADMIN — PIPERACILLIN AND TAZOBACTAM 3.38 G: 3; .375 INJECTION, POWDER, LYOPHILIZED, FOR SOLUTION INTRAVENOUS at 17:26

## 2020-01-01 RX ADMIN — WHITE PETROLATUM 41 % TOPICAL OINTMENT: OINTMENT at 00:00

## 2020-01-01 RX ADMIN — PIPERACILLIN AND TAZOBACTAM 3.38 G: 3; .375 INJECTION, POWDER, LYOPHILIZED, FOR SOLUTION INTRAVENOUS at 05:25

## 2020-01-01 RX ADMIN — CALCIUM GLUCONATE 2 G: 98 INJECTION, SOLUTION INTRAVENOUS at 10:07

## 2020-01-01 RX ADMIN — Medication 10 ML: at 21:01

## 2020-01-01 RX ADMIN — DILTIAZEM HYDROCHLORIDE 120 MG: 120 CAPSULE, COATED, EXTENDED RELEASE ORAL at 09:18

## 2020-01-01 RX ADMIN — Medication 10 ML: at 22:01

## 2020-01-01 RX ADMIN — Medication 10 ML: at 00:31

## 2020-01-01 RX ADMIN — POTASSIUM CHLORIDE 10 MEQ: 750 TABLET, EXTENDED RELEASE ORAL at 13:45

## 2020-01-01 RX ADMIN — Medication 10 ML: at 07:17

## 2020-01-01 RX ADMIN — MEGESTROL ACETATE 200 MG: 40 SUSPENSION ORAL at 20:54

## 2020-01-01 RX ADMIN — MEROPENEM 1 G: 1 INJECTION, POWDER, FOR SOLUTION INTRAVENOUS at 15:01

## 2020-01-01 RX ADMIN — NOREPINEPHRINE BITARTRATE 50 MCG/MIN: 1 INJECTION, SOLUTION, CONCENTRATE INTRAVENOUS at 06:20

## 2020-01-01 RX ADMIN — PIPERACILLIN AND TAZOBACTAM 3.38 G: 3; .375 INJECTION, POWDER, LYOPHILIZED, FOR SOLUTION INTRAVENOUS at 11:49

## 2020-01-01 RX ADMIN — SODIUM BICARBONATE 50 MEQ: 84 INJECTION, SOLUTION INTRAVENOUS at 08:04

## 2020-01-01 RX ADMIN — EPINEPHRINE 100 MCG/MIN: 1 INJECTION PARENTERAL at 12:00

## 2020-01-01 RX ADMIN — Medication 500 ML/HR: at 21:55

## 2020-01-01 RX ADMIN — DILTIAZEM HYDROCHLORIDE 120 MG: 120 CAPSULE, COATED, EXTENDED RELEASE ORAL at 09:10

## 2020-01-01 RX ADMIN — IOPAMIDOL 75 ML: 755 INJECTION, SOLUTION INTRAVENOUS at 13:13

## 2020-01-01 RX ADMIN — SODIUM CHLORIDE 250 ML: 9 INJECTION, SOLUTION INTRAVENOUS at 00:17

## 2020-01-01 RX ADMIN — PIPERACILLIN AND TAZOBACTAM 3.38 G: 3; .375 INJECTION, POWDER, LYOPHILIZED, FOR SOLUTION INTRAVENOUS at 13:35

## 2020-01-01 RX ADMIN — SODIUM CHLORIDE, POTASSIUM CHLORIDE, SODIUM LACTATE AND CALCIUM CHLORIDE: 600; 310; 30; 20 INJECTION, SOLUTION INTRAVENOUS at 18:32

## 2020-01-01 RX ADMIN — POTASSIUM CHLORIDE 10 MEQ: 7.46 INJECTION, SOLUTION INTRAVENOUS at 14:50

## 2020-01-01 RX ADMIN — PIPERACILLIN AND TAZOBACTAM 3.38 G: 3; .375 INJECTION, POWDER, LYOPHILIZED, FOR SOLUTION INTRAVENOUS at 09:32

## 2020-01-01 RX ADMIN — SIMETHICONE 80 MG: 80 TABLET, CHEWABLE ORAL at 16:01

## 2020-01-01 RX ADMIN — DEXTROSE AND SODIUM CHLORIDE: 5; 450 INJECTION, SOLUTION INTRAVENOUS at 13:41

## 2020-01-01 RX ADMIN — ONDANSETRON 4 MG: 2 INJECTION INTRAMUSCULAR; INTRAVENOUS at 13:43

## 2020-01-01 RX ADMIN — WHITE PETROLATUM 41 % TOPICAL OINTMENT: OINTMENT at 09:28

## 2020-01-01 RX ADMIN — MEGESTROL ACETATE 200 MG: 40 SUSPENSION ORAL at 22:44

## 2020-01-01 RX ADMIN — HYDROMORPHONE HYDROCHLORIDE 1 MG: 2 INJECTION, SOLUTION INTRAMUSCULAR; INTRAVENOUS; SUBCUTANEOUS at 10:55

## 2020-01-01 RX ADMIN — HYDROMORPHONE HYDROCHLORIDE 0.5 MG: 2 INJECTION, SOLUTION INTRAMUSCULAR; INTRAVENOUS; SUBCUTANEOUS at 20:47

## 2020-01-01 RX ADMIN — SODIUM BICARBONATE 50 MEQ: 84 INJECTION, SOLUTION INTRAVENOUS at 16:29

## 2020-01-01 RX ADMIN — PANTOPRAZOLE SODIUM 40 MG: 40 INJECTION, POWDER, FOR SOLUTION INTRAVENOUS at 08:00

## 2020-01-01 RX ADMIN — TAMSULOSIN HYDROCHLORIDE 0.4 MG: 0.4 CAPSULE ORAL at 08:00

## 2020-01-01 RX ADMIN — POTASSIUM CHLORIDE 10 MEQ: 750 TABLET, EXTENDED RELEASE ORAL at 13:36

## 2020-01-01 RX ADMIN — MEGESTROL ACETATE 200 MG: 40 SUSPENSION ORAL at 08:30

## 2020-01-01 RX ADMIN — POTASSIUM CHLORIDE 10 MEQ: 7.46 INJECTION, SOLUTION INTRAVENOUS at 13:50

## 2020-01-01 RX ADMIN — TIOTROPIUM BROMIDE INHALATION SPRAY 2 PUFF: 3.12 SPRAY, METERED RESPIRATORY (INHALATION) at 08:00

## 2020-01-01 RX ADMIN — PIPERACILLIN AND TAZOBACTAM 3.38 G: 3; .375 INJECTION, POWDER, LYOPHILIZED, FOR SOLUTION INTRAVENOUS at 00:17

## 2020-01-01 RX ADMIN — DILTIAZEM HYDROCHLORIDE 120 MG: 120 CAPSULE, COATED, EXTENDED RELEASE ORAL at 09:33

## 2020-01-01 RX ADMIN — Medication 10 ML: at 08:25

## 2020-01-01 RX ADMIN — SULFAMETHOXAZOLE AND TRIMETHOPRIM 1 TABLET: 800; 160 TABLET ORAL at 10:56

## 2020-01-01 RX ADMIN — VASOPRESSIN 0.04 UNITS/MIN: 20 INJECTION INTRAVENOUS at 14:42

## 2020-01-01 RX ADMIN — Medication 10 ML: at 22:45

## 2020-01-01 RX ADMIN — TIOTROPIUM BROMIDE INHALATION SPRAY 2 PUFF: 3.12 SPRAY, METERED RESPIRATORY (INHALATION) at 08:33

## 2020-01-01 RX ADMIN — DILTIAZEM HYDROCHLORIDE 120 MG: 120 CAPSULE, COATED, EXTENDED RELEASE ORAL at 08:56

## 2020-01-01 RX ADMIN — HYDROMORPHONE HYDROCHLORIDE 0.5 MG: 2 INJECTION, SOLUTION INTRAMUSCULAR; INTRAVENOUS; SUBCUTANEOUS at 07:14

## 2020-01-01 RX ADMIN — Medication 10 ML: at 20:46

## 2020-01-01 RX ADMIN — POTASSIUM CHLORIDE 10 MEQ: 750 TABLET, EXTENDED RELEASE ORAL at 08:31

## 2020-01-01 RX ADMIN — SODIUM CHLORIDE 1000 ML: 9 INJECTION, SOLUTION INTRAVENOUS at 09:41

## 2020-01-01 RX ADMIN — HYDROMORPHONE HYDROCHLORIDE 0.5 MG: 2 INJECTION, SOLUTION INTRAMUSCULAR; INTRAVENOUS; SUBCUTANEOUS at 13:43

## 2020-01-01 RX ADMIN — HYDROMORPHONE HYDROCHLORIDE 0.5 MG: 2 INJECTION, SOLUTION INTRAMUSCULAR; INTRAVENOUS; SUBCUTANEOUS at 17:19

## 2020-01-01 RX ADMIN — PHENYLEPHRINE HYDROCHLORIDE 50 MCG/MIN: 10 INJECTION INTRAVENOUS at 00:16

## 2020-01-01 RX ADMIN — PIPERACILLIN AND TAZOBACTAM 3.38 G: 3; .375 INJECTION, POWDER, LYOPHILIZED, FOR SOLUTION INTRAVENOUS at 01:34

## 2020-01-01 RX ADMIN — PIPERACILLIN AND TAZOBACTAM 3.38 G: 3; .375 INJECTION, POWDER, LYOPHILIZED, FOR SOLUTION INTRAVENOUS at 06:44

## 2020-01-01 RX ADMIN — I.V. FAT EMULSION 250 ML: 20 EMULSION INTRAVENOUS at 01:15

## 2020-01-01 RX ADMIN — MEGESTROL ACETATE 200 MG: 40 SUSPENSION ORAL at 09:00

## 2020-01-01 RX ADMIN — SODIUM CHLORIDE 80 MG: 9 INJECTION, SOLUTION INTRAVENOUS at 16:28

## 2020-01-01 RX ADMIN — IOPAMIDOL 75 ML: 755 INJECTION, SOLUTION INTRAVENOUS at 12:45

## 2020-01-01 RX ADMIN — Medication 10 ML: at 09:34

## 2020-01-01 RX ADMIN — WHITE PETROLATUM 41 % TOPICAL OINTMENT: OINTMENT at 20:55

## 2020-01-01 RX ADMIN — MEGESTROL ACETATE 40 MG: 20 TABLET ORAL at 23:24

## 2020-01-01 RX ADMIN — WHITE PETROLATUM 41 % TOPICAL OINTMENT: OINTMENT at 14:00

## 2020-01-01 RX ADMIN — SODIUM ZIRCONIUM CYCLOSILICATE 10 G: 10 POWDER, FOR SUSPENSION ORAL at 07:59

## 2020-01-01 RX ADMIN — SODIUM BICARBONATE 50 MEQ: 84 INJECTION, SOLUTION INTRAVENOUS at 08:45

## 2020-01-01 RX ADMIN — Medication 10 ML: at 20:42

## 2020-01-01 RX ADMIN — POTASSIUM CHLORIDE 10 MEQ: 750 TABLET, EXTENDED RELEASE ORAL at 08:24

## 2020-01-01 RX ADMIN — PHENYLEPHRINE HYDROCHLORIDE 300 MCG/MIN: 10 INJECTION INTRAVENOUS at 19:27

## 2020-01-01 RX ADMIN — EPINEPHRINE 30 MCG/MIN: 1 INJECTION PARENTERAL at 09:15

## 2020-01-01 RX ADMIN — METHYLPREDNISOLONE SODIUM SUCCINATE 30 MG: 40 INJECTION, POWDER, FOR SOLUTION INTRAMUSCULAR; INTRAVENOUS at 05:23

## 2020-01-01 RX ADMIN — Medication 500 ML/HR: at 08:41

## 2020-01-01 RX ADMIN — DILTIAZEM HYDROCHLORIDE 120 MG: 120 CAPSULE, COATED, EXTENDED RELEASE ORAL at 08:00

## 2020-01-01 RX ADMIN — TIOTROPIUM BROMIDE INHALATION SPRAY 2 PUFF: 3.12 SPRAY, METERED RESPIRATORY (INHALATION) at 07:59

## 2020-01-01 RX ADMIN — METHYLPREDNISOLONE SODIUM SUCCINATE 30 MG: 40 INJECTION, POWDER, FOR SOLUTION INTRAMUSCULAR; INTRAVENOUS at 06:56

## 2020-01-01 RX ADMIN — ONDANSETRON 4 MG: 2 INJECTION INTRAMUSCULAR; INTRAVENOUS at 19:51

## 2020-01-01 RX ADMIN — ENOXAPARIN SODIUM 40 MG: 40 INJECTION SUBCUTANEOUS at 07:38

## 2020-01-01 RX ADMIN — PANTOPRAZOLE SODIUM 20 MG: 20 TABLET, DELAYED RELEASE ORAL at 05:24

## 2020-01-01 RX ADMIN — HYDROMORPHONE HYDROCHLORIDE 0.5 MG: 2 INJECTION, SOLUTION INTRAMUSCULAR; INTRAVENOUS; SUBCUTANEOUS at 06:24

## 2020-01-01 RX ADMIN — SODIUM CHLORIDE 20 ML: 9 INJECTION, SOLUTION INTRAVENOUS at 07:00

## 2020-01-01 RX ADMIN — PIPERACILLIN AND TAZOBACTAM 3.38 G: 3; .375 INJECTION, POWDER, LYOPHILIZED, FOR SOLUTION INTRAVENOUS at 00:36

## 2020-01-01 RX ADMIN — HYDROMORPHONE HYDROCHLORIDE 0.5 MG: 2 INJECTION, SOLUTION INTRAMUSCULAR; INTRAVENOUS; SUBCUTANEOUS at 16:13

## 2020-01-01 RX ADMIN — TAMSULOSIN HYDROCHLORIDE 0.4 MG: 0.4 CAPSULE ORAL at 18:38

## 2020-01-01 RX ADMIN — EPINEPHRINE 30 MCG/MIN: 1 INJECTION PARENTERAL at 21:08

## 2020-01-01 RX ADMIN — ENOXAPARIN SODIUM 40 MG: 40 INJECTION SUBCUTANEOUS at 09:28

## 2020-01-01 RX ADMIN — TIOTROPIUM BROMIDE INHALATION SPRAY 2 PUFF: 3.12 SPRAY, METERED RESPIRATORY (INHALATION) at 07:17

## 2020-01-01 RX ADMIN — VANCOMYCIN HYDROCHLORIDE 1000 MG: 1 INJECTION, POWDER, LYOPHILIZED, FOR SOLUTION INTRAVENOUS at 15:43

## 2020-01-01 RX ADMIN — NOREPINEPHRINE BITARTRATE 100 MCG/MIN: 1 INJECTION, SOLUTION, CONCENTRATE INTRAVENOUS at 12:25

## 2020-01-01 RX ADMIN — DILTIAZEM HYDROCHLORIDE 120 MG: 120 CAPSULE, COATED, EXTENDED RELEASE ORAL at 08:31

## 2020-01-01 RX ADMIN — HYDROMORPHONE HYDROCHLORIDE 0.5 MG: 2 INJECTION, SOLUTION INTRAMUSCULAR; INTRAVENOUS; SUBCUTANEOUS at 23:44

## 2020-01-01 RX ADMIN — SULFAMETHOXAZOLE AND TRIMETHOPRIM 1 TABLET: 800; 160 TABLET ORAL at 14:26

## 2020-01-01 RX ADMIN — PIPERACILLIN AND TAZOBACTAM 3.38 G: 3; .375 INJECTION, POWDER, LYOPHILIZED, FOR SOLUTION INTRAVENOUS at 01:38

## 2020-01-01 RX ADMIN — Medication 10 ML: at 09:12

## 2020-01-01 RX ADMIN — DILTIAZEM HYDROCHLORIDE 120 MG: 120 CAPSULE, COATED, EXTENDED RELEASE ORAL at 08:30

## 2020-01-01 RX ADMIN — PANTOPRAZOLE SODIUM 40 MG: 40 INJECTION, POWDER, FOR SOLUTION INTRAVENOUS at 21:16

## 2020-01-01 RX ADMIN — MIDAZOLAM HYDROCHLORIDE 5 MG: 5 INJECTION, SOLUTION INTRAMUSCULAR; INTRAVENOUS at 15:23

## 2020-01-01 RX ADMIN — Medication 10 ML: at 20:14

## 2020-01-01 RX ADMIN — ENOXAPARIN SODIUM 40 MG: 40 INJECTION SUBCUTANEOUS at 10:13

## 2020-01-01 RX ADMIN — NOREPINEPHRINE BITARTRATE 50 MCG/MIN: 1 INJECTION, SOLUTION, CONCENTRATE INTRAVENOUS at 00:48

## 2020-01-01 RX ADMIN — SODIUM CHLORIDE: 9 INJECTION, SOLUTION INTRAVENOUS at 01:34

## 2020-01-01 RX ADMIN — PIPERACILLIN AND TAZOBACTAM 3.38 G: 3; .375 INJECTION, POWDER, LYOPHILIZED, FOR SOLUTION INTRAVENOUS at 13:43

## 2020-01-01 RX ADMIN — SODIUM CHLORIDE: 9 INJECTION, SOLUTION INTRAVENOUS at 15:51

## 2020-01-01 RX ADMIN — POTASSIUM CHLORIDE 10 MEQ: 750 TABLET, EXTENDED RELEASE ORAL at 08:56

## 2020-01-01 RX ADMIN — VASOPRESSIN 0.04 UNITS/MIN: 20 INJECTION INTRAVENOUS at 09:46

## 2020-01-01 RX ADMIN — METHYLPREDNISOLONE SODIUM SUCCINATE 30 MG: 40 INJECTION, POWDER, FOR SOLUTION INTRAMUSCULAR; INTRAVENOUS at 05:24

## 2020-01-01 RX ADMIN — SODIUM CHLORIDE 250 ML: 9 INJECTION, SOLUTION INTRAVENOUS at 21:19

## 2020-01-01 RX ADMIN — SODIUM ZIRCONIUM CYCLOSILICATE 10 G: 10 POWDER, FOR SUSPENSION ORAL at 21:51

## 2020-01-01 RX ADMIN — HYDROMORPHONE HYDROCHLORIDE 0.5 MG: 2 INJECTION, SOLUTION INTRAMUSCULAR; INTRAVENOUS; SUBCUTANEOUS at 14:04

## 2020-01-01 RX ADMIN — PHENYLEPHRINE HYDROCHLORIDE 250 MCG/MIN: 10 INJECTION INTRAVENOUS at 09:06

## 2020-01-01 RX ADMIN — METHYLPREDNISOLONE SODIUM SUCCINATE 30 MG: 40 INJECTION, POWDER, FOR SOLUTION INTRAMUSCULAR; INTRAVENOUS at 18:20

## 2020-01-01 RX ADMIN — IOHEXOL 50 ML: 240 INJECTION, SOLUTION INTRATHECAL; INTRAVASCULAR; INTRAVENOUS; ORAL at 14:04

## 2020-01-01 RX ADMIN — POTASSIUM CHLORIDE 10 MEQ: 7.46 INJECTION, SOLUTION INTRAVENOUS at 08:26

## 2020-01-01 RX ADMIN — Medication 10 ML: at 08:21

## 2020-01-01 RX ADMIN — METHYLPREDNISOLONE SODIUM SUCCINATE 40 MG: 40 INJECTION, POWDER, FOR SOLUTION INTRAMUSCULAR; INTRAVENOUS at 22:52

## 2020-01-01 RX ADMIN — PIPERACILLIN AND TAZOBACTAM 3.38 G: 3; .375 INJECTION, POWDER, LYOPHILIZED, FOR SOLUTION INTRAVENOUS at 19:52

## 2020-01-01 RX ADMIN — SODIUM CHLORIDE 8 MG/HR: 9 INJECTION, SOLUTION INTRAVENOUS at 20:14

## 2020-01-01 RX ADMIN — METHYLPREDNISOLONE SODIUM SUCCINATE 40 MG: 40 INJECTION, POWDER, FOR SOLUTION INTRAMUSCULAR; INTRAVENOUS at 22:37

## 2020-01-01 RX ADMIN — MAGNESIUM SULFATE HEPTAHYDRATE 4 G: 40 INJECTION, SOLUTION INTRAVENOUS at 08:55

## 2020-01-01 RX ADMIN — Medication 1000 ML/HR: at 17:41

## 2020-01-01 RX ADMIN — SODIUM CHLORIDE 250 ML: 9 INJECTION, SOLUTION INTRAVENOUS at 20:59

## 2020-01-01 RX ADMIN — Medication 10 ML: at 06:00

## 2020-01-01 RX ADMIN — Medication 10 ML: at 06:33

## 2020-01-01 RX ADMIN — PANTOPRAZOLE SODIUM 20 MG: 20 TABLET, DELAYED RELEASE ORAL at 06:08

## 2020-01-01 RX ADMIN — SODIUM BICARBONATE: 84 INJECTION, SOLUTION INTRAVENOUS at 12:15

## 2020-01-01 RX ADMIN — Medication 10 ML: at 20:55

## 2020-01-01 RX ADMIN — PIPERACILLIN AND TAZOBACTAM 3.38 G: 3; .375 INJECTION, POWDER, LYOPHILIZED, FOR SOLUTION INTRAVENOUS at 01:06

## 2020-01-01 RX ADMIN — METHYLPREDNISOLONE SODIUM SUCCINATE 30 MG: 40 INJECTION, POWDER, FOR SOLUTION INTRAMUSCULAR; INTRAVENOUS at 06:09

## 2020-01-01 RX ADMIN — SODIUM CHLORIDE 8 MG/HR: 9 INJECTION, SOLUTION INTRAVENOUS at 18:02

## 2020-01-01 RX ADMIN — SODIUM CHLORIDE: 9 INJECTION, SOLUTION INTRAVENOUS at 10:13

## 2020-01-01 RX ADMIN — IOPAMIDOL 75 ML: 755 INJECTION, SOLUTION INTRAVENOUS at 14:04

## 2020-01-01 RX ADMIN — ENOXAPARIN SODIUM 40 MG: 40 INJECTION SUBCUTANEOUS at 09:05

## 2020-01-01 RX ADMIN — SODIUM BICARBONATE 50 MEQ: 84 INJECTION, SOLUTION INTRAVENOUS at 17:39

## 2020-01-01 RX ADMIN — DILTIAZEM HYDROCHLORIDE 120 MG: 120 CAPSULE, COATED, EXTENDED RELEASE ORAL at 07:39

## 2020-01-01 RX ADMIN — PANTOPRAZOLE SODIUM 40 MG: 40 INJECTION, POWDER, FOR SOLUTION INTRAVENOUS at 19:52

## 2020-01-01 RX ADMIN — POTASSIUM CHLORIDE 10 MEQ: 750 TABLET, EXTENDED RELEASE ORAL at 12:40

## 2020-01-01 RX ADMIN — PIPERACILLIN AND TAZOBACTAM 3.38 G: 3; .375 INJECTION, POWDER, LYOPHILIZED, FOR SOLUTION INTRAVENOUS at 01:02

## 2020-01-01 RX ADMIN — Medication 10 ML: at 07:39

## 2020-01-01 RX ADMIN — SODIUM CHLORIDE 250 ML: 9 INJECTION, SOLUTION INTRAVENOUS at 19:52

## 2020-01-01 RX ADMIN — Medication 500 ML/HR: at 08:44

## 2020-01-01 RX ADMIN — POTASSIUM CHLORIDE 10 MEQ: 750 TABLET, EXTENDED RELEASE ORAL at 20:58

## 2020-01-01 RX ADMIN — HYDROCORTISONE SODIUM SUCCINATE 125 MG: 250 INJECTION, POWDER, FOR SOLUTION INTRAMUSCULAR; INTRAVENOUS at 16:38

## 2020-01-01 RX ADMIN — METHYLPREDNISOLONE SODIUM SUCCINATE 30 MG: 40 INJECTION, POWDER, FOR SOLUTION INTRAMUSCULAR; INTRAVENOUS at 22:35

## 2020-01-01 RX ADMIN — POTASSIUM CHLORIDE 10 MEQ: 750 TABLET, EXTENDED RELEASE ORAL at 21:05

## 2020-01-01 RX ADMIN — METHYLPREDNISOLONE SODIUM SUCCINATE 30 MG: 40 INJECTION, POWDER, FOR SOLUTION INTRAMUSCULAR; INTRAVENOUS at 18:18

## 2020-01-01 RX ADMIN — NOREPINEPHRINE BITARTRATE 18 MCG/MIN: 1 INJECTION, SOLUTION, CONCENTRATE INTRAVENOUS at 15:51

## 2020-01-01 RX ADMIN — FENTANYL CITRATE 50 MCG: 50 INJECTION, SOLUTION INTRAMUSCULAR; INTRAVENOUS at 09:42

## 2020-01-01 RX ADMIN — TIOTROPIUM BROMIDE INHALATION SPRAY 2 PUFF: 3.12 SPRAY, METERED RESPIRATORY (INHALATION) at 08:12

## 2020-01-01 RX ADMIN — Medication 1000 ML/HR: at 08:36

## 2020-01-01 RX ADMIN — WHITE PETROLATUM 41 % TOPICAL OINTMENT: OINTMENT at 16:14

## 2020-01-01 RX ADMIN — SODIUM CHLORIDE 2097 ML: 9 INJECTION, SOLUTION INTRAVENOUS at 15:19

## 2020-01-01 RX ADMIN — PIPERACILLIN AND TAZOBACTAM 3.38 G: 3; .375 INJECTION, POWDER, LYOPHILIZED, FOR SOLUTION INTRAVENOUS at 01:10

## 2020-01-01 RX ADMIN — PIPERACILLIN AND TAZOBACTAM 3.38 G: 3; .375 INJECTION, POWDER, LYOPHILIZED, FOR SOLUTION INTRAVENOUS at 16:38

## 2020-01-01 RX ADMIN — METHYLPREDNISOLONE SODIUM SUCCINATE 30 MG: 40 INJECTION, POWDER, FOR SOLUTION INTRAMUSCULAR; INTRAVENOUS at 05:46

## 2020-01-01 RX ADMIN — METHYLPREDNISOLONE SODIUM SUCCINATE 40 MG: 40 INJECTION, POWDER, FOR SOLUTION INTRAMUSCULAR; INTRAVENOUS at 07:17

## 2020-01-01 RX ADMIN — METHYLPREDNISOLONE SODIUM SUCCINATE 30 MG: 40 INJECTION, POWDER, FOR SOLUTION INTRAMUSCULAR; INTRAVENOUS at 17:40

## 2020-01-01 RX ADMIN — METHYLPREDNISOLONE SODIUM SUCCINATE 40 MG: 40 INJECTION, POWDER, FOR SOLUTION INTRAMUSCULAR; INTRAVENOUS at 16:14

## 2020-01-01 RX ADMIN — METHYLPREDNISOLONE SODIUM SUCCINATE 40 MG: 40 INJECTION, POWDER, FOR SOLUTION INTRAMUSCULAR; INTRAVENOUS at 15:15

## 2020-01-01 RX ADMIN — SODIUM CHLORIDE: 9 INJECTION, SOLUTION INTRAVENOUS at 05:54

## 2020-01-01 RX ADMIN — POTASSIUM CHLORIDE 10 MEQ: 7.46 INJECTION, SOLUTION INTRAVENOUS at 12:50

## 2020-01-01 RX ADMIN — POTASSIUM CHLORIDE 10 MEQ: 750 TABLET, EXTENDED RELEASE ORAL at 12:19

## 2020-01-01 RX ADMIN — NOREPINEPHRINE BITARTRATE 100 MCG/MIN: 1 INJECTION, SOLUTION, CONCENTRATE INTRAVENOUS at 10:25

## 2020-01-01 RX ADMIN — DILTIAZEM HYDROCHLORIDE 120 MG: 120 CAPSULE, COATED, EXTENDED RELEASE ORAL at 09:00

## 2020-01-01 RX ADMIN — MAGNESIUM SULFATE HEPTAHYDRATE 2 G: 40 INJECTION, SOLUTION INTRAVENOUS at 15:19

## 2020-01-01 RX ADMIN — IOHEXOL 50 ML: 240 INJECTION, SOLUTION INTRATHECAL; INTRAVASCULAR; INTRAVENOUS; ORAL at 13:13

## 2020-01-01 RX ADMIN — TIOTROPIUM BROMIDE INHALATION SPRAY 2 PUFF: 3.12 SPRAY, METERED RESPIRATORY (INHALATION) at 08:23

## 2020-01-01 RX ADMIN — CALCIUM GLUCONATE: 98 INJECTION, SOLUTION INTRAVENOUS at 04:27

## 2020-01-01 RX ADMIN — ONDANSETRON 4 MG: 2 INJECTION INTRAMUSCULAR; INTRAVENOUS at 12:15

## 2020-01-01 RX ADMIN — PIPERACILLIN AND TAZOBACTAM 3.38 G: 3; .375 INJECTION, POWDER, LYOPHILIZED, FOR SOLUTION INTRAVENOUS at 18:28

## 2020-01-01 RX ADMIN — HYDROMORPHONE HYDROCHLORIDE 0.5 MG: 1 INJECTION, SOLUTION INTRAMUSCULAR; INTRAVENOUS; SUBCUTANEOUS at 11:23

## 2020-01-01 RX ADMIN — TIOTROPIUM BROMIDE INHALATION SPRAY 2 PUFF: 3.12 SPRAY, METERED RESPIRATORY (INHALATION) at 08:34

## 2020-01-01 RX ADMIN — Medication 10 ML: at 10:14

## 2020-01-01 RX ADMIN — ENOXAPARIN SODIUM 40 MG: 40 INJECTION SUBCUTANEOUS at 09:00

## 2020-01-01 RX ADMIN — SODIUM CHLORIDE 250 ML: 900 INJECTION, SOLUTION INTRAVENOUS at 11:54

## 2020-01-01 RX ADMIN — IOPAMIDOL 75 ML: 755 INJECTION, SOLUTION INTRAVENOUS at 13:15

## 2020-01-01 RX ADMIN — HYDROMORPHONE HYDROCHLORIDE 0.5 MG: 2 INJECTION, SOLUTION INTRAMUSCULAR; INTRAVENOUS; SUBCUTANEOUS at 22:28

## 2020-01-01 RX ADMIN — METHYLPREDNISOLONE SODIUM SUCCINATE 30 MG: 40 INJECTION, POWDER, FOR SOLUTION INTRAMUSCULAR; INTRAVENOUS at 05:22

## 2020-01-01 RX ADMIN — Medication 1000 ML/HR: at 13:03

## 2020-01-01 RX ADMIN — PIPERACILLIN AND TAZOBACTAM 3.38 G: 3; .375 INJECTION, POWDER, LYOPHILIZED, FOR SOLUTION INTRAVENOUS at 17:10

## 2020-01-01 RX ADMIN — POTASSIUM CHLORIDE 10 MEQ: 750 TABLET, EXTENDED RELEASE ORAL at 20:57

## 2020-01-01 RX ADMIN — PANTOPRAZOLE SODIUM 40 MG: 40 INJECTION, POWDER, FOR SOLUTION INTRAVENOUS at 21:47

## 2020-01-01 RX ADMIN — FENTANYL CITRATE 50 MCG: 50 INJECTION INTRAMUSCULAR; INTRAVENOUS at 17:01

## 2020-01-01 RX ADMIN — SODIUM CHLORIDE 8 MG/HR: 9 INJECTION, SOLUTION INTRAVENOUS at 01:39

## 2020-01-01 RX ADMIN — METHYLPREDNISOLONE SODIUM SUCCINATE 40 MG: 40 INJECTION, POWDER, FOR SOLUTION INTRAMUSCULAR; INTRAVENOUS at 00:31

## 2020-01-01 RX ADMIN — IOPAMIDOL 75 ML: 755 INJECTION, SOLUTION INTRAVENOUS at 10:32

## 2020-01-01 RX ADMIN — Medication 10 ML: at 11:54

## 2020-01-01 RX ADMIN — CALCIUM GLUCONATE 3 G: 98 INJECTION, SOLUTION INTRAVENOUS at 14:12

## 2020-01-01 RX ADMIN — DILTIAZEM HYDROCHLORIDE 120 MG: 120 CAPSULE, COATED, EXTENDED RELEASE ORAL at 09:14

## 2020-01-01 RX ADMIN — POTASSIUM CHLORIDE 10 MEQ: 750 TABLET, EXTENDED RELEASE ORAL at 07:39

## 2020-01-01 RX ADMIN — METHYLPREDNISOLONE SODIUM SUCCINATE 125 MG: 125 INJECTION, POWDER, FOR SOLUTION INTRAMUSCULAR; INTRAVENOUS at 15:22

## 2020-01-01 RX ADMIN — SODIUM CHLORIDE: 9 INJECTION, SOLUTION INTRAVENOUS at 17:46

## 2020-01-01 RX ADMIN — DILTIAZEM HYDROCHLORIDE 120 MG: 120 CAPSULE, COATED, EXTENDED RELEASE ORAL at 08:24

## 2020-01-01 RX ADMIN — TIOTROPIUM BROMIDE INHALATION SPRAY 2 PUFF: 3.12 SPRAY, METERED RESPIRATORY (INHALATION) at 09:11

## 2020-01-01 RX ADMIN — PANTOPRAZOLE SODIUM 40 MG: 40 INJECTION, POWDER, FOR SOLUTION INTRAVENOUS at 21:01

## 2020-01-01 RX ADMIN — IOHEXOL 50 ML: 240 INJECTION, SOLUTION INTRATHECAL; INTRAVASCULAR; INTRAVENOUS; ORAL at 12:39

## 2020-01-01 RX ADMIN — PIPERACILLIN AND TAZOBACTAM 3.38 G: 3; .375 INJECTION, POWDER, LYOPHILIZED, FOR SOLUTION INTRAVENOUS at 06:39

## 2020-01-01 RX ADMIN — SODIUM CHLORIDE 8 MG/HR: 9 INJECTION, SOLUTION INTRAVENOUS at 17:50

## 2020-01-01 RX ADMIN — ENOXAPARIN SODIUM 40 MG: 40 INJECTION SUBCUTANEOUS at 08:00

## 2020-01-01 RX ADMIN — ENOXAPARIN SODIUM 40 MG: 40 INJECTION SUBCUTANEOUS at 08:24

## 2020-01-01 RX ADMIN — TIOTROPIUM BROMIDE INHALATION SPRAY 2 PUFF: 3.12 SPRAY, METERED RESPIRATORY (INHALATION) at 08:46

## 2020-01-01 RX ADMIN — POTASSIUM CHLORIDE 10 MEQ: 750 TABLET, EXTENDED RELEASE ORAL at 20:14

## 2020-01-01 RX ADMIN — METHYLPREDNISOLONE SODIUM SUCCINATE 30 MG: 40 INJECTION, POWDER, FOR SOLUTION INTRAMUSCULAR; INTRAVENOUS at 18:09

## 2020-01-01 RX ADMIN — Medication 10 ML: at 09:19

## 2020-01-01 RX ADMIN — PIPERACILLIN AND TAZOBACTAM 3.38 G: 3; .375 INJECTION, POWDER, LYOPHILIZED, FOR SOLUTION INTRAVENOUS at 05:22

## 2020-01-01 RX ADMIN — Medication: at 08:41

## 2020-01-01 RX ADMIN — Medication 10 ML: at 08:13

## 2020-01-01 RX ADMIN — PANTOPRAZOLE SODIUM 40 MG: 40 INJECTION, POWDER, FOR SOLUTION INTRAVENOUS at 22:07

## 2020-01-01 RX ADMIN — MEROPENEM 1 G: 1 INJECTION, POWDER, FOR SOLUTION INTRAVENOUS at 11:58

## 2020-01-01 RX ADMIN — WHITE PETROLATUM 41 % TOPICAL OINTMENT: OINTMENT at 16:00

## 2020-01-01 RX ADMIN — SODIUM CHLORIDE, POTASSIUM CHLORIDE, SODIUM LACTATE AND CALCIUM CHLORIDE: 600; 310; 30; 20 INJECTION, SOLUTION INTRAVENOUS at 15:51

## 2020-01-01 RX ADMIN — PIPERACILLIN AND TAZOBACTAM 3.38 G: 3; .375 INJECTION, POWDER, LYOPHILIZED, FOR SOLUTION INTRAVENOUS at 00:30

## 2020-01-01 RX ADMIN — HYDROMORPHONE HYDROCHLORIDE 0.5 MG: 2 INJECTION, SOLUTION INTRAMUSCULAR; INTRAVENOUS; SUBCUTANEOUS at 22:46

## 2020-01-01 RX ADMIN — METHYLPREDNISOLONE SODIUM SUCCINATE 30 MG: 40 INJECTION, POWDER, FOR SOLUTION INTRAMUSCULAR; INTRAVENOUS at 17:19

## 2020-01-01 RX ADMIN — SODIUM BICARBONATE: 84 INJECTION, SOLUTION INTRAVENOUS at 04:41

## 2020-01-01 RX ADMIN — FENTANYL CITRATE 25 MCG/HR: 50 INJECTION, SOLUTION INTRAMUSCULAR; INTRAVENOUS at 20:38

## 2020-01-01 RX ADMIN — Medication 10 ML: at 22:03

## 2020-01-01 RX ADMIN — IOHEXOL 50 ML: 240 INJECTION, SOLUTION INTRATHECAL; INTRAVASCULAR; INTRAVENOUS; ORAL at 11:46

## 2020-01-01 RX ADMIN — PIPERACILLIN AND TAZOBACTAM 3.38 G: 3; .375 INJECTION, POWDER, LYOPHILIZED, FOR SOLUTION INTRAVENOUS at 17:01

## 2020-01-01 RX ADMIN — PIPERACILLIN AND TAZOBACTAM 3.38 G: 3; .375 INJECTION, POWDER, LYOPHILIZED, FOR SOLUTION INTRAVENOUS at 18:51

## 2020-01-01 RX ADMIN — HYDROMORPHONE HYDROCHLORIDE 0.5 MG: 2 INJECTION, SOLUTION INTRAMUSCULAR; INTRAVENOUS; SUBCUTANEOUS at 12:05

## 2020-01-01 RX ADMIN — SODIUM CHLORIDE: 9 INJECTION, SOLUTION INTRAVENOUS at 05:18

## 2020-01-01 RX ADMIN — POTASSIUM CHLORIDE 10 MEQ: 750 TABLET, EXTENDED RELEASE ORAL at 09:33

## 2020-01-01 RX ADMIN — WHITE PETROLATUM 41 % TOPICAL OINTMENT: OINTMENT at 09:20

## 2020-01-01 RX ADMIN — Medication: at 20:46

## 2020-01-01 RX ADMIN — Medication 10 ML: at 22:32

## 2020-01-01 RX ADMIN — Medication 10 ML: at 21:46

## 2020-01-01 RX ADMIN — SODIUM CHLORIDE 8 MG/HR: 9 INJECTION, SOLUTION INTRAVENOUS at 05:22

## 2020-01-01 RX ADMIN — Medication 10 ML: at 06:17

## 2020-01-01 RX ADMIN — TIOTROPIUM BROMIDE INHALATION SPRAY 2 PUFF: 3.12 SPRAY, METERED RESPIRATORY (INHALATION) at 08:45

## 2020-01-01 RX ADMIN — METHYLPREDNISOLONE SODIUM SUCCINATE 30 MG: 40 INJECTION, POWDER, FOR SOLUTION INTRAMUSCULAR; INTRAVENOUS at 06:00

## 2020-01-01 RX ADMIN — METHYLPREDNISOLONE SODIUM SUCCINATE 30 MG: 40 INJECTION, POWDER, FOR SOLUTION INTRAMUSCULAR; INTRAVENOUS at 18:39

## 2020-01-01 RX ADMIN — METHYLPREDNISOLONE SODIUM SUCCINATE 30 MG: 40 INJECTION, POWDER, FOR SOLUTION INTRAMUSCULAR; INTRAVENOUS at 17:01

## 2020-01-01 RX ADMIN — IOPAMIDOL 75 ML: 755 INJECTION, SOLUTION INTRAVENOUS at 12:39

## 2020-01-01 RX ADMIN — METHYLPREDNISOLONE SODIUM SUCCINATE 40 MG: 40 INJECTION, POWDER, FOR SOLUTION INTRAMUSCULAR; INTRAVENOUS at 16:11

## 2020-01-01 RX ADMIN — PANTOPRAZOLE SODIUM 40 MG: 40 INJECTION, POWDER, FOR SOLUTION INTRAVENOUS at 20:57

## 2020-01-01 RX ADMIN — EPINEPHRINE 30 MCG/MIN: 1 INJECTION PARENTERAL at 06:02

## 2020-01-01 RX ADMIN — MEGESTROL ACETATE 200 MG: 40 SUSPENSION ORAL at 09:18

## 2020-01-01 RX ADMIN — Medication 1000 ML/HR: at 03:17

## 2020-01-01 RX ADMIN — LIDOCAINE HYDROCHLORIDE 80 MG: 20 INJECTION, SOLUTION INFILTRATION; PERINEURAL at 15:51

## 2020-01-01 RX ADMIN — METHYLPREDNISOLONE SODIUM SUCCINATE 30 MG: 40 INJECTION, POWDER, FOR SOLUTION INTRAMUSCULAR; INTRAVENOUS at 05:31

## 2020-01-01 RX ADMIN — MEGESTROL ACETATE 200 MG: 40 SUSPENSION ORAL at 10:13

## 2020-01-01 RX ADMIN — IOHEXOL 50 ML: 240 INJECTION, SOLUTION INTRATHECAL; INTRAVASCULAR; INTRAVENOUS; ORAL at 08:25

## 2020-01-01 RX ADMIN — SODIUM BICARBONATE: 84 INJECTION, SOLUTION INTRAVENOUS at 17:46

## 2020-01-01 RX ADMIN — SODIUM BICARBONATE 100 MEQ: 84 INJECTION, SOLUTION INTRAVENOUS at 09:37

## 2020-01-01 RX ADMIN — IOPAMIDOL 75 ML: 755 INJECTION, SOLUTION INTRAVENOUS at 11:37

## 2020-01-01 RX ADMIN — Medication 10 ML: at 08:00

## 2020-01-01 RX ADMIN — PHENYLEPHRINE HYDROCHLORIDE 300 MCG/MIN: 10 INJECTION INTRAVENOUS at 00:45

## 2020-01-01 RX ADMIN — HYDROMORPHONE HYDROCHLORIDE 0.5 MG: 2 INJECTION, SOLUTION INTRAMUSCULAR; INTRAVENOUS; SUBCUTANEOUS at 16:08

## 2020-01-01 RX ADMIN — VASOPRESSIN 0.04 UNITS/MIN: 20 INJECTION INTRAVENOUS at 16:44

## 2020-01-01 ASSESSMENT — ENCOUNTER SYMPTOMS
ABDOMINAL PAIN: 0
BACK PAIN: 0
NAUSEA: 0
COUGH: 0
VOMITING: 0
DIARRHEA: 0
RHINORRHEA: 0
SORE THROAT: 0
PHOTOPHOBIA: 0
SHORTNESS OF BREATH: 0
COUGH: 0
WHEEZING: 0
NAUSEA: 0
BACK PAIN: 0
VOMITING: 0
DIARRHEA: 1
ABDOMINAL PAIN: 1
SHORTNESS OF BREATH: 0

## 2020-01-01 ASSESSMENT — PAIN SCALES - GENERAL
PAINLEVEL_OUTOF10: 2
PAINLEVEL_OUTOF10: 6
PAINLEVEL_OUTOF10: 3
PAINLEVEL_OUTOF10: 4
PAINLEVEL_OUTOF10: 7
PAINLEVEL_OUTOF10: 2
PAINLEVEL_OUTOF10: 0
PAINLEVEL_OUTOF10: 7
PAINLEVEL_OUTOF10: 6
PAINLEVEL_OUTOF10: 9
PAINLEVEL_OUTOF10: 8
PAINLEVEL_OUTOF10: 0
PAINLEVEL_OUTOF10: 0
PAINLEVEL_OUTOF10: 7
PAINLEVEL_OUTOF10: 0
PAINLEVEL_OUTOF10: 4
PAINLEVEL_OUTOF10: 5
PAINLEVEL_OUTOF10: 0
PAINLEVEL_OUTOF10: 8
PAINLEVEL_OUTOF10: 2
PAINLEVEL_OUTOF10: 2
PAINLEVEL_OUTOF10: 9
PAINLEVEL_OUTOF10: 0
PAINLEVEL_OUTOF10: 6
PAINLEVEL_OUTOF10: 0
PAINLEVEL_OUTOF10: 9
PAINLEVEL_OUTOF10: 0
PAINLEVEL_OUTOF10: 8
PAINLEVEL_OUTOF10: 3
PAINLEVEL_OUTOF10: 2
PAINLEVEL_OUTOF10: 0
PAINLEVEL_OUTOF10: 0
PAINLEVEL_OUTOF10: 8
PAINLEVEL_OUTOF10: 7
PAINLEVEL_OUTOF10: 1
PAINLEVEL_OUTOF10: 0
PAINLEVEL_OUTOF10: 0
PAINLEVEL_OUTOF10: 2
PAINLEVEL_OUTOF10: 5
PAINLEVEL_OUTOF10: 6
PAINLEVEL_OUTOF10: 6
PAINLEVEL_OUTOF10: 5
PAINLEVEL_OUTOF10: 0
PAINLEVEL_OUTOF10: 5
PAINLEVEL_OUTOF10: 0
PAINLEVEL_OUTOF10: 10
PAINLEVEL_OUTOF10: 9
PAINLEVEL_OUTOF10: 3
PAINLEVEL_OUTOF10: 0
PAINLEVEL_OUTOF10: 7
PAINLEVEL_OUTOF10: 0
PAINLEVEL_OUTOF10: 9
PAINLEVEL_OUTOF10: 7
PAINLEVEL_OUTOF10: 8
PAINLEVEL_OUTOF10: 3
PAINLEVEL_OUTOF10: 0
PAINLEVEL_OUTOF10: 10
PAINLEVEL_OUTOF10: 0
PAINLEVEL_OUTOF10: 8
PAINLEVEL_OUTOF10: 7
PAINLEVEL_OUTOF10: 0
PAINLEVEL_OUTOF10: 8
PAINLEVEL_OUTOF10: 0
PAINLEVEL_OUTOF10: 0
PAINLEVEL_OUTOF10: 9
PAINLEVEL_OUTOF10: 8
PAINLEVEL_OUTOF10: 8
PAINLEVEL_OUTOF10: 0
PAINLEVEL_OUTOF10: 2
PAINLEVEL_OUTOF10: 8
PAINLEVEL_OUTOF10: 0
PAINLEVEL_OUTOF10: 9
PAINLEVEL_OUTOF10: 8
PAINLEVEL_OUTOF10: 9
PAINLEVEL_OUTOF10: 4
PAINLEVEL_OUTOF10: 3

## 2020-01-01 ASSESSMENT — PULMONARY FUNCTION TESTS
PIF_VALUE: 31
PIF_VALUE: 30
PIF_VALUE: 35
PIF_VALUE: 31
PIF_VALUE: 31
PIF_VALUE: 35
PIF_VALUE: 31
PIF_VALUE: 38
PIF_VALUE: 31
PIF_VALUE: 38
PIF_VALUE: 31
PIF_VALUE: 38
PIF_VALUE: 31
PIF_VALUE: 28
PIF_VALUE: 38
PIF_VALUE: 31
PIF_VALUE: 29
PIF_VALUE: 31
PIF_VALUE: 27
PIF_VALUE: 38
PIF_VALUE: 23
PIF_VALUE: 31
PIF_VALUE: 38
PIF_VALUE: 39
PIF_VALUE: 39
PIF_VALUE: 23
PIF_VALUE: 31
PIF_VALUE: 25
PIF_VALUE: 32
PIF_VALUE: 31
PIF_VALUE: 31
PIF_VALUE: 28
PIF_VALUE: 29
PIF_VALUE: 31
PIF_VALUE: 39
PIF_VALUE: 38
PIF_VALUE: 29
PIF_VALUE: 31
PIF_VALUE: 39
PIF_VALUE: 38
PIF_VALUE: 35
PIF_VALUE: 31
PIF_VALUE: 39
PIF_VALUE: 31
PIF_VALUE: 29
PIF_VALUE: 29
PIF_VALUE: 31
PIF_VALUE: 38
PIF_VALUE: 31
PIF_VALUE: 38
PIF_VALUE: 16
PIF_VALUE: 29
PIF_VALUE: 35
PIF_VALUE: 25
PIF_VALUE: 31
PIF_VALUE: 32
PIF_VALUE: 38
PIF_VALUE: 38
PIF_VALUE: 31
PIF_VALUE: 31
PIF_VALUE: 35
PIF_VALUE: 38
PIF_VALUE: 35
PIF_VALUE: 39
PIF_VALUE: 38
PIF_VALUE: 25
PIF_VALUE: 31
PIF_VALUE: 31
PIF_VALUE: 38
PIF_VALUE: 23
PIF_VALUE: 19
PIF_VALUE: 38
PIF_VALUE: 38
PIF_VALUE: 34
PIF_VALUE: 31
PIF_VALUE: 34
PIF_VALUE: 31
PIF_VALUE: 39
PIF_VALUE: 23
PIF_VALUE: 30
PIF_VALUE: 35
PIF_VALUE: 34
PIF_VALUE: 38
PIF_VALUE: 31
PIF_VALUE: 38
PIF_VALUE: 35
PIF_VALUE: 25
PIF_VALUE: 38
PIF_VALUE: 31
PIF_VALUE: 35
PIF_VALUE: 38
PIF_VALUE: 33
PIF_VALUE: 40
PIF_VALUE: 33
PIF_VALUE: 38
PIF_VALUE: 31
PIF_VALUE: 40
PIF_VALUE: 29
PIF_VALUE: 35
PIF_VALUE: 31
PIF_VALUE: 36
PIF_VALUE: 22
PIF_VALUE: 38
PIF_VALUE: 38
PIF_VALUE: 31
PIF_VALUE: 35
PIF_VALUE: 38
PIF_VALUE: 31
PIF_VALUE: 28
PIF_VALUE: 30
PIF_VALUE: 38
PIF_VALUE: 36
PIF_VALUE: 38
PIF_VALUE: 31
PIF_VALUE: 23
PIF_VALUE: 38
PIF_VALUE: 31
PIF_VALUE: 40
PIF_VALUE: 31
PIF_VALUE: 31
PIF_VALUE: 38
PIF_VALUE: 31
PIF_VALUE: 30
PIF_VALUE: 38
PIF_VALUE: 38
PIF_VALUE: 31
PIF_VALUE: 20
PIF_VALUE: 35
PIF_VALUE: 38
PIF_VALUE: 31
PIF_VALUE: 32
PIF_VALUE: 31
PIF_VALUE: 38
PIF_VALUE: 31
PIF_VALUE: 38
PIF_VALUE: 29
PIF_VALUE: 37
PIF_VALUE: 38
PIF_VALUE: 31
PIF_VALUE: 21
PIF_VALUE: 38
PIF_VALUE: 38
PIF_VALUE: 30
PIF_VALUE: 31
PIF_VALUE: 38
PIF_VALUE: 25
PIF_VALUE: 30
PIF_VALUE: 28
PIF_VALUE: 30
PIF_VALUE: 38
PIF_VALUE: 29
PIF_VALUE: 31
PIF_VALUE: 31
PIF_VALUE: 38
PIF_VALUE: 36
PIF_VALUE: 27
PIF_VALUE: 28
PIF_VALUE: 25
PIF_VALUE: 38
PIF_VALUE: 31
PIF_VALUE: 41
PIF_VALUE: 29
PIF_VALUE: 35
PIF_VALUE: 38
PIF_VALUE: 31
PIF_VALUE: 34
PIF_VALUE: 38
PIF_VALUE: 31
PIF_VALUE: 35
PIF_VALUE: 31
PIF_VALUE: 38
PIF_VALUE: 30
PIF_VALUE: 38
PIF_VALUE: 35
PIF_VALUE: 31
PIF_VALUE: 31
PIF_VALUE: 38
PIF_VALUE: 35
PIF_VALUE: 31
PIF_VALUE: 38

## 2020-01-01 ASSESSMENT — PAIN DESCRIPTION - LOCATION
LOCATION: ABDOMEN
LOCATION: FOOT
LOCATION: ABDOMEN
LOCATION: HAND;FOOT
LOCATION: ABDOMEN
LOCATION: FOOT
LOCATION: ABDOMEN
LOCATION: ABDOMEN

## 2020-01-01 ASSESSMENT — PAIN DESCRIPTION - PAIN TYPE
TYPE: ACUTE PAIN
TYPE: ACUTE PAIN;CHRONIC PAIN

## 2020-01-01 ASSESSMENT — PAIN DESCRIPTION - ORIENTATION
ORIENTATION: RIGHT;MID
ORIENTATION: LEFT;RIGHT
ORIENTATION: LEFT
ORIENTATION: LEFT;LOWER
ORIENTATION: RIGHT;LEFT
ORIENTATION: LOWER;LEFT
ORIENTATION: LEFT;LOWER
ORIENTATION: LEFT

## 2020-01-01 ASSESSMENT — PAIN DESCRIPTION - DESCRIPTORS
DESCRIPTORS: ACHING
DESCRIPTORS: ACHING

## 2020-01-01 ASSESSMENT — PAIN DESCRIPTION - PROGRESSION: CLINICAL_PROGRESSION: GRADUALLY IMPROVING

## 2020-01-01 ASSESSMENT — LIFESTYLE VARIABLES: SMOKING_STATUS: 1

## 2020-01-01 ASSESSMENT — PAIN DESCRIPTION - ONSET: ONSET: ON-GOING

## 2020-01-01 ASSESSMENT — PAIN DESCRIPTION - FREQUENCY: FREQUENCY: CONTINUOUS

## 2020-01-21 NOTE — PROGRESS NOTES
symptoms:   Neurogenic bowel or bladder symptoms:  no   Perceived weakness:  yes   Difficulty walking:  yes    Recent Imaging (within past one year)   Xrays: no   MRI or CT of spine: no    Current/Past Treatment:   · Physical Therapy:  none  · Chiropractic:  none  · Injection:  none  · Medications:   NSAIDS:  Aleve   Muscle relaxer:  none   Steriods:  none   Neuropathic medications:  none   Opioids:  none  · Previous surgery:  no  · Previous surgical consult:  no  · Other:  · Infection control  · Tested positive for MRSA in past 12 months:  no  · Tested positive for MSSA \"staph infection\" in past 12 months: no  · Tested positive for VRE (Vancomycin Resistant Enterococci) in past 12 months:   no  · Currently on any antibiotics for an infection: yes  · Anticoagulants:  · On a blood thinner:  no   · Any history of bleeding disorder: yes   · MRI Contraindication: no   · Previous Pain Management: no   · Goal for treatment improve function   · How long can you stand? unlimited     Sit? 15 minutes        Walk? - unlimited, but uncomfortable                  Past Medical History:   Past Medical History:   Diagnosis Date    Blood transfusion     Cancer ([address]     basal and squamous cell skin ca    COPD, mild ([address]     Disease of blood and blood forming organ     History of blood transfusion     Medical history reviewed with no changes     Non Hodgkin's [facility] [address] [facility] 12/6/12    chemo started 12/26/12x6, then stem cell tx    Pneumonia     lymphoma, pneumo history    Pneumothorax Dec. 2012    Spontaneous pneumothorax     x 3 last 11/3/2011      Past Surgical History:     Past Surgical History:   Procedure Laterality Date    CHEST TUBE INSERTION      x3    COLONOSCOPY  4/9    polyps q 5 yrs    COLONOSCOPY  5/15/13    FEMUR SURGERY      r/t fracture with retained hardware    FRACTURE SURGERY  1960    broken arm    FRACTURE SURGERY  2003    femur    LYMPH NODE BIOPSY  12/11/12    right inguinal lymph node excision and tone are normal. No atrophy or abnormal movements are noted. · LEFT UPPER EXTREMITY: Inspection/examination of the left upper extremity does not show any tenderness, deformity or injury. Range of motion is normal and pain-free. There is no gross instability. There are no rashes, ulcerations or lesions. Strength and tone are normal. No atrophy or abnormal movements are noted. LUMBAR/SACRAL EXAMINATION:  · Inspection: Local inspection shows no step-off or bruising. Lumbar alignment is normal. No instability is noted. · Palpation:   No evidence of tenderness at the midline. Lumbar paraspinal tenderness Mild L4/5 and L5/S1 tenderness  Bursal tenderness No tenderness bilaterally  There is no paraspinal spasm. · Range of Motion: limited by 25% in all planes due to pain  · Strength:   Strength testing is 5/5 in all muscle groups tested. · Special Tests:   Straight leg raise and crossed SLR negative. Emery's testing is negative bilaterally. FADIR's testing is negative bilaterally. · Skin: There are no rashes, ulcerations or lesions. · Reflexes: Reflexes are symmetrically 2+ at the patellar and ankle tendons. Clonus absent bilaterally at the feet. · Gait & station: normal, patient ambulates without assistance and no ataxia  · Additional Examinations:  · RIGHT LOWER EXTREMITY: Inspection/examination of the right lower extremity does not show any tenderness, deformity or injury. Range of motion is unremarkable. There is no gross instability. There are no rashes, ulcerations or lesions. Strength and tone are normal. No atrophy or abnormal movements are noted. · LEFT LOWER EXTREMITY:  Inspection/examination of the left lower extremity does not show any tenderness, deformity or injury. Range of motion is unremarkable. There is no gross instability. There are no rashes, ulcerations or lesions. Strength and tone are normal. No atrophy or abnormal movements are noted.       Diagnostic Testing:    Xrays:   AP and mmol/L   TYPE AND SCREEN   Result Value Ref Range    ABO/Rh O POS     Antibody Screen NEG        Impression (Medical Decision Making):       1. Closed compression fracture of L5 lumbar vertebra, initial encounter ([address]    2. Degenerative disc disease, lumbar    3. Spondylosis without myelopathy or radiculopathy, lumbar region    4. Pain of lumbar spine        Plan (Medical Decision Making):    I discussed the diagnosis and the treatment options with Asa Graves today. In Summary:  The various treatment options were outlined and discussed with Asa Graves including:  Conservative care options: physical therapy, ice, medications, bracing, and activity modification. The indications for therapeutic injections. The indications for additional imaging/laboratory studies. The indications for (possible future) interventions. After considering the various options discussed, Asa Graves elected to pursue a course of treatment that includes the followin. Medications: Continue anti-inflammatories with appropriate GI Precautions including to stop if develop dark tarry stools or GI upset and to take with food. 2. PT:  Hold on PT or HEP until MRI is complete    3. Further studies: MR Lumbar spine to evaluate for lumbar compression fracture L5      4. Interventional:  After further imaging is obtained, interventional options will be reviewed and recommended.     5. Healthy Lifestyle Measures:  Patient education material reviewing the following was distributed to Asa Graves  Anatomic drawings  Healthy lifestyle education  Osteoporosis prevention,   Back and neck pain educational information   Advanced imaging preparedness    Posture education   Proper lifting and carrying techniques,   Weight management  Quitting smoking and   Minor ways to treat back pain  For further information regarding the spine conditions and to review interventional treatments the patient was directed to

## 2020-01-21 NOTE — LETTER
[facility]  [address] [facility] [address]  Phone: [phone]  Fax: [phone]        January 21, 2020      Patient: Heather Balbuena  MR Number: [id number]  YOB: 1945  Date of Visit: 1/21/2020    Dear Dr. Janice Eason,      We all got into medicine to help and heal. One of the most devastating things to have is constant chronic pain. It means so much to us to be able to make a difference in people's lives. It means even more when someone like yourself has confidence in my ability to contribute. I am very appreciative for the chance to make a difference in your patient's life. Thank you for the request for consultation for [facility] for the evaluation of low back pain. Below are the relevant portions of my assessment and plan of care. If you have questions, please do not hesitate to call me. I look forward to following Aden Olmstead along with you.     Sincerely,    MD Nano Carcamo MD  [address] [place] [address]  [address] [facility] [address] [facility]

## 2020-02-11 NOTE — PROGRESS NOTES
injury. Range of motion is unremarkable and pain-free. There is no gross instability. There are no rashes, ulcerations or lesions. Strength and tone are normal. No atrophy or abnormal movements are noted. · LEFT UPPER EXTREMITY: Inspection/examination of the left upper extremity does not show any tenderness, deformity or injury. Range of motion is unremarkable and pain-free. There is no gross instability. There are no rashes, ulcerations or lesions. Strength and tone are normal. No atrophy or abnormal movements are noted. LUMBAR/SACRAL EXAMINATION:  · Inspection: Local inspection shows no step-off or bruising. Lumbar alignment is normal. No instability is noted. · Palpation:   No evidence of tenderness at the midline. Lumbar paraspinal tenderness: Mild tenderness Bursal tenderness No tenderness bilaterally  There is no paraspinal spasm. · Range of Motion: limited by 25%  · Strength:   Strength testing is 5/5 in all muscle groups tested. · Special Tests:   Straight leg raise and crossed SLR negative. Emery's testing is negative bilaterally. FADIR's testing is negative bilaterally. Bowstring test negative. Slump test negative. · Skin: There are no rashes, ulcerations or lesions. · Reflexes: Reflexes are symmetrically 2+ at the patellar and ankle tendons. Clonus absent bilaterally at the feet. · Gait & station: antalgic on the left and no ataxia  · Additional Examinations:  · RIGHT LOWER EXTREMITY: Inspection/examination of the right lower extremity does not show any tenderness, deformity or injury. Range of motion is normal and pain-free. There is no gross instability. There are no rashes, ulcerations or lesions. Strength and tone are normal. No atrophy or abnormal movements are noted. · LEFT LOWER EXTREMITY:  Inspection/examination of the left lower extremity does not show any tenderness, deformity or injury. Range of motion is normal and pain-free. There is no gross instability.  There are no welcome to call for an appointment sooner if he has any additional concerns or questions. Missy Basurto ATC, am scribing for and in the presence of Dr. Lela Castano.   02/11/20 10:37 AM Shanita Ramos ATC    The physical examination was performed between the patient and Dr. Lela Castano. All counseling during the appointment was performed between the patient and provider. I, Dr. Britton Abel. Ryan, personally performed the services described in this documentation as scribed by RISSA Latif in my presence and it is both accurate and complete. Mendez Batres. Titus Lechuga MD, MYNOR, [facility]  Board Certified in [address] [place] [address] Certified and Fellowship Trained in [facility] ([facility])             This dictation was performed with a verbal recognition program [facility]) and it was checked for errors. It is possible that there are still dictated errors within this office note. If so, please bring any errors to my attention for an addendum. All efforts were made to ensure that this office note is accurate.

## 2020-02-24 NOTE — TELEPHONE ENCOUNTER
S/W DAVID who states Dr. Tila Benjamin - oncologist - is going to order a DEXA scan prior to seeing endocrinologist. They wanted to inform the office and provider first.

## 2020-03-05 PROBLEM — F17.210 CIGARETTE SMOKER: Status: ACTIVE | Noted: 2020-01-01

## 2020-03-05 NOTE — PROGRESS NOTES
[facility] Pulmonary, Critical Care, and Sleep    Outpatient Follow Up Note    CC: SOB, COPD  Consulting provider: Sanjuana Almonte MD    Interval History: 69 y.o. male   Spiriva not started due to cost. The lozenges did not help much. He continues to smoke 1-2 cig per day. Using his rescue inhaler less than daily. No wheezing or cough. Initial HPI: h/o Mantle cell lymphoma is referred for a history of COPD. He uses his PRN albuterol less than daily. Never hospitalized. Does not have MEDRANO with ADLs or walking even greater than 1 mile. Would have mild MEDRANO with 2 flights of stairs. The MEDRANO is stable and not progressing. Rarely wheezes. Mild intermittent morning cough. Retired   Smokes a few cigarettes every few days. In the past smoked 1/2 PPD for 50 years.  Has tried to quit in the past.    Current Medications:    Current Outpatient Medications:     Sulfamethoxazole-Trimethoprim (BACTRIM PO), Take by mouth DUE TO A RESEARCH STUDY, Disp: , Rfl:     diltiazem (CARTIA XT) 120 MG extended release capsule, Take 1 capsule by mouth daily, Disp: 90 capsule, Rfl: 3    albuterol sulfate HFA (PROAIR HFA) 108 (90 Base) MCG/ACT inhaler, Inhale 2 puffs into the lungs every 6 hours as needed for Wheezing or Shortness of Breath, Disp: 1 Inhaler, Rfl: 5    megestrol (MEGACE) 40 MG tablet, Take 200 mg by mouth 2 times daily, Disp: , Rfl:     traZODone (DESYREL) 50 MG tablet, TAKE 1 TO 2 TABLETS BY MOUTH EVERY NIGHT AT BEDTIME FOR SLEEP, Disp: 60 tablet, Rfl: 5    Multiple Vitamins-Iron (MULTIVITAMIN/IRON PO), Take 1 tablet by mouth daily , Disp: , Rfl:     NONFORMULARY, RESEARCH MEDICATION - BLINDED TO PATIENT, Disp: , Rfl:     tiotropium (SPIRIVA RESPIMAT) 2.5 MCG/ACT AERS inhaler, Inhale 2 puffs into the lungs daily (Patient not taking: Reported on 3/5/2020), Disp: 1 Inhaler, Rfl: 5    hydrOXYzine (ATARAX) 25 MG tablet, Take 1 tablet by mouth 2 times daily as needed for Anxiety (Patient not taking: Reported on 3/5/2020), Disp: 30 tablet, Rfl: 1    Objective:   PHYSICAL EXAM:    VITALS:  /70   Pulse 87   Resp 19   Ht 6' 0.99\" (1.854 m)   Wt 154 lb 12.8 oz (70.2 kg)   SpO2 94% Comment: RA  BMI 20.43 kg/m²   Constitutional: In no acute distress. Appears stated age. Well developed and nourished  Eyes: PERRL. No sclera icterus. No conjunctival injection. ENT: Oropharynx clear. Neck: Trachea midline. No thyroid tenderness. Lymph: No cervical LAD. No supraclavicular LAD. Resp: No accessory muscle use. No crackles. No wheezes. No rhonchi. CV: Regular rate. Regular rhythm. No murmur or rub. No lower extremity edema. Skin: Warm and dry. No nodules on exposed extremities. No rash on exposed extremities. Musc: No clubbing. No cyanosis. No synovitis or joint deformity in digits. Psych: Oriented x 3. Mood and affect normal. Intact judgment and insight. LABS:  Reviewed any pertinent new labs that are available. PFTs   5/9/13    FVC (77%) FEV1 2.0L (66%) FEV1/FVC ratio 53 TLC (%) RV (139%) DLCO (%) Bronchodilator response:   8/18/15:  FVC (71%) FEV1 1.8L (47%) FEV1/FVC ratio 49 TLC (122%) RV (224%) DLCO (76%) Bronchodilator response: + 6MWT: 1100ft, 95%    IMAGING: I personally reviewed and interpreted the following imaging today in the office:   10/10/16 Chest CT: mild emphysema. ASSESSMENT:   COPD, severe  History of Mantle cell lymphoma, original diagnosis 12/2012, status post R-CHOP x6, status post autologous stem cell transplant, 06/13/13, with biopsy-proven relapsed disease 09/26/14, completed Imbruvica, started 10/4/14 ; now on experimental protocol  Left Vats Pleurodesis 1/2018 and also in 2011  H/o remote right PTX but he does not recall a surgery on that side  Cigarette abuse    PLAN:   Start Incruse  Continue PRN albuterol   We discussed smoking cessation for >3 minutes.  We discussed the association of smoking with the patient's current symptoms and the risks of continued use and the

## 2020-07-14 PROBLEM — L30.9 DERMATITIS: Status: ACTIVE | Noted: 2020-01-01

## 2020-07-14 NOTE — ED NOTES

## 2020-07-14 NOTE — ED NOTES
1540 - PS to hospitalists  Re: exfoliative dermatitis, needs Step down  1545 - Dr Yoni Vu called back to speak with Dr Jaiden Wilkerson  07/14/20 STEFF/ Ivanna Corbett

## 2020-07-14 NOTE — ED NOTES

## 2020-07-14 NOTE — ED NOTES
Attempted to call [facility] to come down for report; stated that they did not know when able to come get patient due to shift change. Did not get bed during sterile cockpit.      Raya Shaw RN  07/14/20 8598

## 2020-07-14 NOTE — H&P
Hospital Medicine History & Physical      PCP: Phillip Mejia MD    Date of Admission: 7/14/2020    Date of Service: Pt seen/examined on 14 July and Admitted to Inpatient with expected LOS greater than two midnights due to medical therapy. Chief Complaint:  Skin peeling. History Of Present Illness:        77 y.o. male w/ known hx of Lymphoma in active drug trial who presented to [facility] with a several day hx of skin peeling. ED d/w Heme/Onc w/ reported known possible ADR of Exfoliative Dermatitis. The patient denies any fever/chills or other signs/sxs of systemic illness or identifiable aggravating/alleviating factors.       Past Medical History:          Diagnosis Date    Blood transfusion     Cancer ([address]     basal and squamous cell skin ca    COPD, mild (HCC)     Dermatitis 7/14/2020    Disease of blood and blood forming organ     History of blood transfusion     Medical history reviewed with no changes     Non Hodgkin's [address] [address] 12/6/12    chemo started 12/26/12x6, then stem cell tx    Pneumonia     lymphoma, pneumo history    Pneumothorax Dec. 2012    Spontaneous pneumothorax     x 3 last 11/3/2011       Past Surgical History:          Procedure Laterality Date    CHEST TUBE INSERTION      x3    COLONOSCOPY  4/9    polyps q 5 yrs    COLONOSCOPY  5/15/13    FEMUR SURGERY      r/t fracture with retained hardware    FRACTURE SURGERY  1960    broken arm    FRACTURE SURGERY  2003    femur    LYMPH NODE BIOPSY  12/11/12    right inguinal lymph node excision and biopsy    MIDDLE EAR SURGERY      OTHER SURGICAL HISTORY  6/7/12    stem cell transplant    SKIN BIOPSY      SKIN CANCER DESTRUCTION      STOMACH SURGERY      at age 7 weeks    THORACOSCOPY Left 01/08/2018    video assisted thoracoscopy,left upper lobe wedge resection;mechanical and chemical pleurodesis    TUNNELED VENOUS PORT PLACEMENT         Medications Prior to Admission:      Prior to Admission medications    Medication Sig Start Date End Date Taking? Authorizing Provider   INCRUSE ELLIPTA 62.5 MCG/INH AEPB INHALE 1 PUFF INTO THE LUNGS DAILY 20  Yes Nolberto Ndiaye MD   traZODone (DESYREL) 50 MG tablet TAKE 1 TO 2 TABLETS BY MOUTH EVERY NIGHT AT BEDTIME FOR SLEEP 3/9/20  Yes Christopher Shields MD   Sulfamethoxazole-Trimethoprim (BACTRIM PO) Take by mouth DUE TO A RESEARCH STUDY   Yes Historical Provider, MD   [address] [place] [address]   Yes Historical Provider, MD   diltiazem (CARTIA XT) 120 MG extended release capsule Take 1 capsule by mouth daily 19  Yes ANAID Cosby - CNP   albuterol sulfate HFA (PROAIR HFA) 108 (90 Base) MCG/ACT inhaler Inhale 2 puffs into the lungs every 6 hours as needed for Wheezing or Shortness of Breath 19  Yes Nolberto Ndiaye MD   megestrol (MEGACE) 40 MG tablet Take 200 mg by mouth 2 times daily   Yes Historical Provider, MD   hydrOXYzine (ATARAX) 25 MG tablet Take 1 tablet by mouth 2 times daily as needed for Anxiety 18  Yes Christopher Shields MD   Multiple Vitamins-Iron (MULTIVITAMIN/IRON PO) Take 1 tablet by mouth daily    Yes Historical Provider, MD       Allergies:  Latex and Adhesive tape    Social History:      The patient currently lives independently. Wife of 54 year present at bedside in ED when seen. TOBACCO:   reports that he has been smoking cigarettes. He has a 50.00 pack-year smoking history. He has never used smokeless tobacco.  ETOH:   reports current alcohol use. Family History:      Reviewed in detail and negative for DM, CAD. Positive as follows:        Problem Relation Age of Onset    High Blood Pressure Father     Stroke Father 68    Alcohol Abuse Sister     Liver Disease Sister          age 55    [de-identified] Brother     COPD Brother     Cancer Brother         Brain / Lung       REVIEW OF SYSTEMS:   Pertinent positives as noted in the HPI. All other systems reviewed and negative.     PHYSICAL EXAM:    /67   Pulse 106   Temp 98.2 °F (36.8 °C) (Oral)   Resp 18   Ht 6' (1.829 m)   Wt 154 lb (69.9 kg)   SpO2 98%   BMI 20.89 kg/m²     General appearance:  No apparent distress, appears stated age and cooperative. HEENT:  Normal cephalic, atraumatic without obvious deformity. Pupils equal, round, and reactive to light. Extra ocular muscles intact. Conjunctivae/corneas clear. Neck: Supple, with full range of motion. No jugular venous distention. Trachea midline. Respiratory:  Normal respiratory effort. Clear to auscultation, bilaterally without Rales/Wheezes/Rhonchi. Cardiovascular:  Regular rate and rhythm with normal S1/S2 without murmurs, rubs or gallops. Abdomen: Soft, non-tender, non-distended with normal bowel sounds. Musculoskeletal:  No clubbing, cyanosis or edema bilaterally. Full range of motion without deformity. Skin: Skin color, texture, turgor normal.  No rashes or lesions. Neurologic:  Neurovascularly intact without any focal sensory/motor deficits. Cranial nerves: II-XII intact, grossly non-focal.  Psychiatric:  Alert and oriented, thought content appropriate, normal insight  Capillary Refill: Brisk,< 3 seconds   Peripheral Pulses: +2 palpable, equal bilaterally       CXR:  I have reviewed the CXR with the following interpretation: no acute ASD   EKG:  I have reviewed the EKG with the following interpretation: No acute ischemic changes. Labs:     Recent Labs     07/14/20  1441   WBC 10.1   HGB 10.6*   HCT 31.5*        Recent Labs     07/14/20  1441   *   K 4.5      CO2 15*   BUN 20   CREATININE 1.6*   CALCIUM 6.5*     Recent Labs     07/14/20  1441   AST 18   ALT 22   BILITOT 0.3   ALKPHOS 80     No results for input(s): INR in the last 72 hours.   Recent Labs     07/14/20  1441   TROPONINI <0.01       Urinalysis:      Lab Results   Component Value Date    NITRU Negative 06/24/2013    WBCUA 0-2 06/24/2013    BACTERIA Few 05/08/2013    RBCUA 10-20 06/24/2013    BLOODU SMALL 06/24/2013    SPECGRAV 1.015 06/24/2013    GLUCOSEU Negative 06/24/2013    GLUCOSEU NEGATIVE 11/30/2011         ASSESSMENT:    Active Hospital Problems    Diagnosis Date Noted    Dermatitis [L30.9] 07/14/2020       PLAN:      Dermatitis - Exfoliative, likely 2nd to chemo ADR. Started on Solumedrol in ED. Heme/Onc consulted from ED and appreciated in advance. Lymphoma - currently in drug tx trial.  Heme/Onc consulted and appreciated in advance. Anemia - etiology clinically unable to determine, w/out evidence of active bleeding/hemolysis. Asymptomatic w/out indication for transfusion. Follow serial labs. Reviewed and documented as above. ARF - w/out elevated BUN/Cr ratio but w/ hx c/w pre-renal azotemia. Given IVF hydration and follow serial labs. Reviewed and documented as above. HypoNatremia - etiology clinically unable to determine but likely hypovolemic. Follow serial labs on IVF. Reviewed and documented as above. COPD - w/out chronic respiratory failure on no baseline home O2. Controlled on home medication regimen - continued. DVT Prophylaxis: LMWH  Diet: General  Code Status: Full Code      PT/OT Eval Status: not yet ordered. Dispo - likely here several days. Kurtis Salazar MD    Thank you Alia Cabral MD for the opportunity to be involved in this patient's care. If you have any questions or concerns please feel free to contact me at [phone].

## 2020-07-14 NOTE — ED PROVIDER NOTES
other surgical history (12); skin biopsy; and Thoracoscopy (Left, 2018). Home medications:   Prior to Admission medications    Medication Sig Start Date End Date Taking? Authorizing Provider   INCRUSE ELLIPTA 62.5 MCG/INH AEPB INHALE 1 PUFF INTO THE LUNGS DAILY 20  Yes Bola Devries MD   traZODone (DESYREL) 50 MG tablet TAKE 1 TO 2 TABLETS BY MOUTH EVERY NIGHT AT BEDTIME FOR SLEEP 3/9/20  Yes Papito Anderson MD   Sulfamethoxazole-Trimethoprim (BACTRIM PO) Take by mouth DUE TO A RESEARCH STUDY   Yes Historical Provider, MD   [address] [place] [address]   Yes Historical Provider, MD   diltiazem (CARTIA XT) 120 MG extended release capsule Take 1 capsule by mouth daily 19  Yes ANAID Orantes CNP   albuterol sulfate HFA (PROAIR HFA) 108 (90 Base) MCG/ACT inhaler Inhale 2 puffs into the lungs every 6 hours as needed for Wheezing or Shortness of Breath 19  Yes Bola Devries MD   megestrol (MEGACE) 40 MG tablet Take 200 mg by mouth 2 times daily   Yes Historical Provider, MD   hydrOXYzine (ATARAX) 25 MG tablet Take 1 tablet by mouth 2 times daily as needed for Anxiety 18  Yes Papito Anderson MD   Multiple Vitamins-Iron (MULTIVITAMIN/IRON PO) Take 1 tablet by mouth daily    Yes Historical Provider, MD       Social history:  reports that he has been smoking cigarettes. He has a 50.00 pack-year smoking history. He has never used smokeless tobacco. He reports current alcohol use. He reports that he does not use drugs. Family history:    Family History   Problem Relation Age of Onset    High Blood Pressure Father     Stroke Father 68    Alcohol Abuse Sister     Liver Disease Sister          age 54    Cancer Brother     COPD Brother     Cancer Brother         Brain / Lung         ROS  Review of Systems   Constitutional: Positive for chills. Negative for fever. HENT: Negative for congestion and rhinorrhea.     Eyes: Negative for photophobia and Status: He is alert and oriented to person, place, and time. Motor: No abnormal muscle tone. Psychiatric:         Behavior: Behavior normal.           ED Course    ED Medication Orders (From admission, onward)    Start Ordered     Status Ordering Provider    07/14/20 1500 07/14/20 1451  0.9 % sodium chloride IV bolus 2,097 mL  ONCE      Last MAR action:  New Bag - by Magda Garcia on 07/14/20 at [facility]    07/14/20 1500 07/14/20 1451  vancomycin 1000 mg IVPB in 250 mL D5W addavial  ONCE      Last MAR action:  Stopped - by Lala Gibson on 07/14/20 at [facility] [address]    07/14/20 1500 07/14/20 1451  methylPREDNISolone sodium (SOLU-MEDROL) injection 125 mg  ONCE      Last MAR action:  Given - by Magda Garcia on 07/14/20 at [facility]. Treovn Meng, [facility] SHANICE L              Radiology  Xr Chest Portable    Result Date: 7/14/2020  EXAMINATION: ONE XRAY VIEW OF THE CHEST 7/14/2020 2:53 pm COMPARISON: CT chest, 06/08/2020 HISTORY: ORDERING SYSTEM PROVIDED HISTORY: sirs TECHNOLOGIST PROVIDED HISTORY: Reason for exam:->sirs Reason for Exam: lymphoma, exfoliating dermatitis Acuity: Acute Type of Exam: Initial FINDINGS: A left infusion port catheter terminates over the superior cavoatrial junction. The cardiac silhouette is normal.  Aortic arch calcification and mild tortuosity of the thoracic aorta are noted. Hilar contours are stable. Lungs are hyperinflated. No consolidation, pleural effusion or pneumothorax is evident. No acute cardiopulmonary disease. Changes consistent with COPD.          Labs  Results for orders placed or performed during the hospital encounter of 07/14/20   CBC Auto Differential   Result Value Ref Range    WBC 10.1 4.0 - 11.0 K/uL    RBC 3.46 (L) 4.20 - 5.90 M/uL    Hemoglobin 10.6 (L) 13.5 - 17.5 g/dL    Hematocrit 31.5 (L) 40.5 - 52.5 %    MCV 91.2 80.0 - 100.0 fL    MCH 30.5 26.0 - 34.0 pg    MCHC 33.5 31.0 - 36.0 g/dL    RDW 16.0 (H) 12.4 - 15.4 %    Platelets 253 212 - 705 K/uL MPV 7.1 5.0 - 10.5 fL    PLATELET SLIDE REVIEW Adequate     SLIDE REVIEW see below     Neutrophils % 10.0 %    Lymphocytes % 19.0 %    Monocytes % 5.0 %    Eosinophils % 0.0 %    Basophils % 0.0 %    Neutrophils Absolute 6.8 1.7 - 7.7 K/uL    Lymphocytes Absolute 2.8 1.0 - 5.1 K/uL    Monocytes Absolute 0.5 0.0 - 1.3 K/uL    Eosinophils Absolute 0.0 0.0 - 0.6 K/uL    Basophils Absolute 0.0 0.0 - 0.2 K/uL    Bands Relative 57 (H) 0 - 7 %    Atypical Lymphocytes Relative 9 (H) 0 - 6 %    Anisocytosis 1+ (A)     Polychromasia Occasional (A)     Poikilocytes Occasional (A)    Comprehensive Metabolic Panel w/ Reflex to MG   Result Value Ref Range    Sodium 133 (L) 136 - 145 mmol/L    Potassium reflex Magnesium 4.5 3.5 - 5.1 mmol/L    Chloride 103 99 - 110 mmol/L    CO2 15 (L) 21 - 32 mmol/L    Anion Gap 15 3 - 16    Glucose 151 (H) 70 - 99 mg/dL    BUN 20 7 - 20 mg/dL    CREATININE 1.6 (H) 0.8 - 1.3 mg/dL    GFR Non-African American 42 (A) >60    GFR  51 (A) >60    Calcium 6.5 (L) 8.3 - 10.6 mg/dL    Total Protein 4.4 (L) 6.4 - 8.2 g/dL    Alb 2.7 (L) 3.4 - 5.0 g/dL    Albumin/Globulin Ratio 1.6 1.1 - 2.2    Total Bilirubin 0.3 0.0 - 1.0 mg/dL    Alkaline Phosphatase 80 40 - 129 U/L    ALT 22 10 - 40 U/L    AST 18 15 - 37 U/L    Globulin 1.7 g/dL   Lipase   Result Value Ref Range    Lipase 8.0 (L) 13.0 - 60.0 U/L   Troponin   Result Value Ref Range    Troponin <0.01 <0.01 ng/mL   Lactate, Sepsis   Result Value Ref Range    Lactic Acid, Sepsis 3.2 (H) 0.4 - 1.9 mmol/L   EKG 12 Lead   Result Value Ref Range    Ventricular Rate 106 BPM    Atrial Rate 106 BPM    P-R Interval 140 ms    QRS Duration 86 ms    Q-T Interval 362 ms    QTc Calculation (Bazett) 480 ms    P Axis 58 degrees    R Axis -54 degrees    T Axis 72 degrees    Diagnosis       Demand pacemaker; interpretation is based on intrinsic rhythmSinus tachycardia with occasional Premature ventricular complexes and Fusion complexesLeft axis deviationInferior infarct , age undeterminedCannot rule out Anterior infarct , age undeterminedAbnormal ECGNo previous ECGs available         MDM  This is a 27-year-old male on experimental drug clinical trial for lymphoma who presents with diffuse severe exfoliative dermatitis-like rash. Endorses chills and is somewhat anxious appearing but no other complaints. Is not painful or pleuritic. On exam he is overall in no acute distress he is anxious appearing was initially tachypneic however is now calm down and vitals are now within normal limits. I did consider multiple life-threatening possibilities given his rash. He has no evidence of mucocutaneous involvement. Does not follow clear pattern for SJS/TENS and there is not significant sloughing as I would expect. To be conservative we will treat with IV Solu-Medrol however. We will also lean towards treating with IV antibiotics for possible scattered skin syndrome though less likely. Discussed with Dr. Qing Lopez states that there is a correlation with exfoliative dermatitis with this trial medication. Will treat with IV fluids for unintentional losses, will wrap skin, admit to hospitalist Ted Zambrano stepdown level care. Clinical Impression:  1. Exfoliative dermatitis          Disposition:  Admit to telemetry in stable condition. Blood pressure 100/76, pulse 105, temperature 98.2 °F (36.8 °C), temperature source Oral, resp. rate 21, height 6' (1.829 m), weight 154 lb (69.9 kg), SpO2 97 %. Patient was given scripts for the following medications. I counseled patient how to take these medications.    New Prescriptions    No medications on file       Disposition referral (if applicable):  Johanna Grace MD  93 Johana Emerson MD  [address]  [phone]              Total critical care time is 30-35 minutes, which excludes separately billable procedures and updating family. Time spent is specifically for management of the presenting complaint and symptoms initially, direct bedside care, reevaluation, review of records, and consultation. There was a high probability of clinically significant life-threatening deterioration in the patient's condition, which required my urgent intervention. This chart was generated in part by using Dragon Dictation system and may contain errors related to that system including errors in grammar, punctuation, and spelling, as well as words and phrases that may be inappropriate. If there are any questions or concerns please feel free to contact the dictating provider for clarification.      Jillian Yip MD  [address] [place]  07/14/20 4600

## 2020-07-14 NOTE — ED NOTES
Bed: 08  Expected date:   Expected time:   Means of arrival:   Comments:  [address] [facility]  07/14/20 6779

## 2020-07-14 NOTE — ED NOTES
1534 - PS to Dr Aldo Hargrove per Hem/Onc consult  Purnima Hargrove called back to speak with Dr Gianni Naylor  07/14/20 7891

## 2020-07-15 NOTE — CARE COORDINATION
CASE MANAGEMENT INITIAL ASSESSMENT      Reviewed chart and completed assessment via telephone with:  Explained Case Management role/services. Triggered by age and diagnosis    Primary contact information: Bam Lowry spouse [phone]    Admit date/status: 7/14/2020 inpatient  Diagnosis: dermatitis in setting of drug induced rash , mantle lymphoma  Is this a Readmission?:  No    Insurance: Medicare and [facility] - Y        3 night stay required -  N    Living arrangements, Adls, care needs, prior to admission: Lives at home independently with spouse    Transportation: TBD    Durable Medical Equipment at home: Walker__Cane__RTS__ BSC__Shower Chair__  02__ HHN__ CPAP__  BiPap__  Hospital Bed__ W/C___ Other__________    Services in the home and/or outpatient, prior to admission: None      PT/OT recs: not seen, ambulatory    Hospital Exemption Notification (HEN): N/A    Barriers to discharge: None identified at present    Plan/comments: To return home with spouse. He denies need for home care or assistance.  Will monitor hospital course for any barriers that arise to discharge or any needs or specialty recommendations    [facility] on chart for MD signature

## 2020-07-15 NOTE — PROGRESS NOTES
evidence of active bleeding/hemolysis. Asymptomatic w/out indication for transfusion. Follow serial labs. Reviewed and documented as above. HypoNatremia - etiology clinically unable to determine but likely hypovolemic. Follow serial labs on IVF. Reviewed and documented as above.     COPD - w/out chronic respiratory failure on no baseline home O2. Controlled on home medication regimen - continued.         DVT Prophylaxis: LMWH  Diet: DIET GENERAL;  Code Status: DNR-CCA      PT/OT Eval Status: not yet ordered.      Dispo - likely here several days pending Heme/Onc recs and clinical course.        Kely Sim MD

## 2020-07-15 NOTE — CONSULTS
Hematology/Oncology Consultation         Patient:   Jose Cordova       Reason for Consult:  NHL on study drug   Requesting Physician: No ref. provider found    Chief Complaint:     Chief Complaint   Patient presents with    Other     Patient from [address] [place] [address] office to be admitted for exfoliative dermatitis per . Patient on experiemental medication for Lymphoma, skin peeling for the past 2 weeks. History Obtained from:     patient, electronic medical record    History of Present Illness:     Jose Cordova is a 77 y.o. male  with significant past medical history of mantle cell NHL who presents with diffuse rash and diarrhea while on study drug [id number].      Past Medical History:         Diagnosis Date    Blood transfusion     Cancer ([facility]     basal and squamous cell skin ca    COPD, mild (HCC)     Dermatitis 7/14/2020    Disease of blood and blood forming organ     History of blood transfusion     Medical history reviewed with no changes     Non Hodgkin's lymphoma ([facility] 12/6/12    chemo started 12/26/12x6, then stem cell tx    Pneumonia     lymphoma, pneumo history    Pneumothorax Dec. 2012    Spontaneous pneumothorax     x 3 last 11/3/2011       Past Surgical History:         Procedure Laterality Date    CHEST TUBE INSERTION      x3    COLONOSCOPY  4/9    polyps q 5 yrs    COLONOSCOPY  5/15/13    FEMUR SURGERY      r/t fracture with retained hardware   [address]    broken arm    FRACTURE SURGERY  2003    femur    LYMPH NODE BIOPSY  12/11/12    right inguinal lymph node excision and biopsy    MIDDLE EAR SURGERY      OTHER SURGICAL HISTORY  6/7/12    stem cell transplant    SKIN BIOPSY      SKIN CANCER DESTRUCTION      STOMACH SURGERY      at age 7 weeks    THORACOSCOPY Left 01/08/2018    video assisted thoracoscopy,left upper lobe wedge resection;mechanical and chemical pleurodesis    TUNNELED VENOUS PORT PLACEMENT         Current Medications:     Current Facility-Administered Medications   Medication Dose Route Frequency Provider Last Rate Last Dose    hydrocortisone 1 % cream   Topical BID ANAID Stock NP   Stopped at 07/15/20 9323    diphenhydrAMINE (BENADRYL) tablet 25 mg  25 mg Oral Q6H PRN ANAID Stock NP        0.9 % sodium chloride infusion   Intravenous Continuous Dana Payne  mL/hr at 07/15/20 0554      albuterol (PROVENTIL) nebulizer solution 2.5 mg  2.5 mg Nebulization Q6H PRN Dana Payne MD        dilTIAZem (CARDIZEM CD) extended release capsule 120 mg  120 mg Oral Daily Dana Payne MD   120 mg at 07/15/20 5711    hydrOXYzine (ATARAX) tablet 25 mg  25 mg Oral BID PRN Dana Payne MD        tiotropium (SPIRIVA RESPIMAT) 2.5 MCG/ACT inhaler 2 puff  2 puff Inhalation Daily Dana Payne MD   2 puff at 07/15/20 0710    traZODone (DESYREL) tablet 100 mg  100 mg Oral Nightly Dana Payne MD        methylPREDNISolone sodium (SOLU-MEDROL) injection 40 mg  40 mg Intravenous Q8H Dana Payne MD   40 mg at 07/15/20 1377    sodium chloride flush 0.9 % injection 10 mL  10 mL Intravenous 2 times per day Dana Payne MD   10 mL at 07/14/20 2245    sodium chloride flush 0.9 % injection 10 mL  10 mL Intravenous PRN Dana Payne MD        acetaminophen (TYLENOL) tablet 650 mg  650 mg Oral Q6H PRN Dana Payne MD        Or   [place] acetaminophen (TYLENOL) suppository 650 mg  650 mg Rectal Q6H PRN Dana Payne MD        polyethylene glycol (GLYCOLAX) packet 17 g  17 g Oral Daily PRN Dana Payne MD        promethazine (PHENERGAN) tablet 12.5 mg  12.5 mg Oral Q6H PRN Dana Payne MD        Or    ondansetron [facility]) injection 4 mg  4 mg Intravenous Q6H PRN Dana Payne MD        enoxaparin (LOVENOX) injection 40 mg  40 mg Subcutaneous Daily Dana Payne MD   40 mg at 07/15/20 0905    oxyCODONE-acetaminophen (PERCOCET) 5-325 MG per tablet 1 tablet  1 tablet Oral Q4H PRN Dana Payne MD        Or    oxyCODONE-acetaminophen (PERCOCET) 5-325 MG per tablet 2 tablet  2 tablet Oral Q4H PRN Dana Payne MD        megestrol (MEGACE) 40 MG/ML suspension 200 mg  200 mg Oral BID Dana Payne MD   200 mg at 07/15/20 9281       Allergies: Allergies   Allergen Reactions    Latex Rash    Adhesive Tape      Rash       Social History:     Social History     Socioeconomic History    Marital status:      Spouse name: Mónica Howe Number of children: Not on file    Years of education: 15    Highest education level: Not on file   Occupational History     Comment: fire dept retired   Social Needs    Financial resource strain: Not on file    Food insecurity     Worry: Not on file     Inability: Not on file   [employer] needs     Medical: Not on file     Non-medical: Not on file   Tobacco Use    Smoking status: Current Every Day Smoker     Packs/day: 1.00     Years: 50.00     Pack years: 50.00     Types: Cigarettes    Smokeless tobacco: Never Used    Tobacco comment: 1-2 cigs daily - 3/5/20   Substance and Sexual Activity    Alcohol use:  Yes     Alcohol/week: 0.0 standard drinks     Comment: 8 pack beer weekly    Drug use: No    Sexual activity: Never     Partners: Female   Lifestyle    Physical activity     Days per week: Not on file     Minutes per session: Not on file    Stress: Not on file   Relationships    Social connections     Talks on phone: Not on file     Gets together: Not on file     Attends Zoroastrianism service: Not on file     Active member of club or organization: Not on file     Attends meetings of clubs or organizations: Not on file     Relationship status: Not on file    Intimate partner violence     Fear of current or ex partner: Not on file     Emotionally abused: Not on file     Physically abused: Not on file     Forced sexual activity: Not on file   Other Topics Concern    Not on file   Social History Narrative    ** Merged History Encounter **          Social History     Substance and Sexual Activity   Drug Use No     Social History     Substance and Sexual Activity   Alcohol Use Yes    Alcohol/week: 0.0 standard drinks    Comment: 8 pack beer weekly     Social History     Substance and Sexual Activity   Sexual Activity Never    Partners: Female     Social History     Tobacco Use   Smoking Status Current Every Day Smoker    Packs/day: 1.00    Years: 50.00    Pack years: 50.00    Types: Cigarettes   Smokeless Tobacco Never Used   Tobacco Comment    1-2 cigs daily - 3/5/20       Family History:     Family History   Problem Relation Age of Onset    High Blood Pressure Father     Stroke Father 68    Alcohol Abuse Sister     Liver Disease Sister          age 54    Cancer Brother     COPD Brother     Cancer Brother         Brain / Lung       Review of Systems:     Constitutional: Denies fever, sweats, weight loss. .     Eyes: No visual changes or diplopia. No scleral icterus. ENT: No Headaches, no hearing loss, no  vertigo. No mouth sores or sore throat. Cardiovascular: No chest pain, no dyspnea on exertion, no palpitations, no  loss of consciousness. Respiratory: No cough, no  wheezing, no dyspnea, no sputum production. No hemoptysis. .    Gastrointestinal: see HPI   Genitourinary: No dysuria, trouble voiding, or hematuria. Musculoskeletal:  Generalized weakness. No joint complaints. Integumentary: see HPI   Neurological: No headache, diplopia. No change in gait, balance, or coordination. No paresthesias. Endocrine: No temperature intolerance. No excessive thirst, fluid intake, or urination. Hematologic/Lymphatic: No abnormal bruising or ecchymoses, no blood clots or swollen lymph nodes. Allergic/Immunologic: No nasal congestion or hives.      Physical Exam:     /61   Pulse 97   Temp 98 °F (36.7 °C)   Resp 20   Ht 6' (1.829 m)   Wt 143 lb 1.6 oz (64.9 kg)   SpO2 97%   BMI 19.41 kg/m²     CONSTITUTIONAL: awake, alert, Medical Oncology/Hematology    [facility] - [address], [address]  Phone: [phone]  Fax: [phone]

## 2020-07-15 NOTE — PROGRESS NOTES
RESPIRATORY THERAPY ASSESSMENT    Name:  Colette Padgett  Medical Record Number:  [id number]  Age: 77 y.o. Gender: male  : 1945  Today's Date:  2020  Room:  0421/0421-01    Assessment     Is the patient being admitted for a COPD or Asthma exacerbation? No   (If yes the patient will be seen every 4 hours for the first 24 hours and then reassessed)    Patient Admission Diagnosis      Allergies  Allergies   Allergen Reactions    Latex Rash    Adhesive Tape      Rash       Minimum Predicted Vital Capacity:     1155          Actual Vital Capacity:      2000              Pulmonary History:COPD  Home Oxygen Therapy:  room air  Home Respiratory Therapy:Albuterol prn and ELLIPTA BID  Current Respiratory Therapy:  ALBUTEROL Q6 PRN SPIRIVA 2PUFF DAILY          Respiratory Severity Index(RSI)   Patients with orders for inhalation medications, oxygen, or any therapeutic treatment modality will be placed on Respiratory Protocol. They will be assessed with the first treatment and at least every 72 hours thereafter. The following severity scale will be used to determine frequency of treatment intervention. Smoking History: Pulmonary Disease or Smoking History, Greater than 15 pack year = 2    Social History  Social History     Tobacco Use    Smoking status: Current Every Day Smoker     Packs/day: 1.00     Years: 50.00     Pack years: 50.00     Types: Cigarettes    Smokeless tobacco: Never Used    Tobacco comment: 1-2 cigs daily - 3/5/20   Substance Use Topics    Alcohol use:  Yes     Alcohol/week: 0.0 standard drinks     Comment: 8 pack beer weekly    Drug use: No       Recent Surgical History: None = 0  Past Surgical History  Past Surgical History:   Procedure Laterality Date    CHEST TUBE INSERTION      x3    COLONOSCOPY      polyps q 5 yrs    COLONOSCOPY  5/15/13    FEMUR SURGERY      r/t fracture with retained hardware    FRACTURE SURGERY      broken arm    FRACTURE SURGERY   femur    LYMPH NODE BIOPSY  12/11/12    right inguinal lymph node excision and biopsy    MIDDLE EAR SURGERY      OTHER SURGICAL HISTORY  6/7/12    stem cell transplant    SKIN BIOPSY      SKIN CANCER DESTRUCTION      STOMACH SURGERY      at age 7 weeks    THORACOSCOPY Left 01/08/2018    video assisted thoracoscopy,left upper lobe wedge resection;mechanical and chemical pleurodesis    TUNNELED VENOUS PORT PLACEMENT         Level of Consciousness: Alert, Oriented, and Cooperative = 0    Level of Activity: Walking unassisted = 0    Respiratory Pattern: Regular Pattern; RR 8-20 = 0    Breath Sounds: Clear = 0    Sputum   ,  ,    Cough: Strong, spontaneous, non-productive = 0    Vital Signs   BP (!) 141/71   Pulse 107   Temp 98.2 °F (36.8 °C) (Oral)   Resp 20   Ht 6' (1.829 m)   Wt 154 lb (69.9 kg)   SpO2 98%   BMI 20.89 kg/m²   SPO2 (COPD values may differ): Greater than or equal to 92% on room air = 0    Peak Flow (asthma only): not applicable = 0    RSI: 0-4 = See once and convert to home regimen or discontinue        Plan       Goals: mobilize retained secretions, volume expansion and improve oxygenation    Patient/caregiver was educated on the proper method of use for Respiratory Care Devices:  Yes      Level of patient/caregiver understanding able to:   ? Verbalize understanding   ? Demonstrate understanding       ? Teach back        ? Needs reinforcement       ? No available caregiver               ? Other:     Response to education:  Excellent     Is patient being placed on Home Treatment Regimen? Yes     Does the patient have everything they need prior to discharge? Yes     Comments: PATIENT ASSESSED CHART REVIEWED    Plan of Care: HOME REG. Electronically signed by Yunior Preston RCP on 7/14/2020 at 9:25 PM    Respiratory Protocol Guidelines     1.  Assessment and treatment by Respiratory Therapy will be initiated for medication and therapeutic interventions upon initiation of aerosolized bronchodilator. 2. Discontinue if patient experiences worsened bronchospasm, or secretions have lessened to the point that the patient is able to clear them with a cough. Anti-inflammatory and Combination Medications:    1. If the patient lacks prior history of lung disease, is not using inhaled anti-inflammatory medication at home, and lacks wheezing by examination or by history for at least 24 hours, contact physician for possible discontinuation.

## 2020-07-15 NOTE — PROGRESS NOTES
Bedside report given to [facility]. Call light and bedside table within reach. No needs voiced at this time. Bed in lowest position, brakes locked. Bed alarm on and in place.

## 2020-07-15 NOTE — PLAN OF CARE
10/11/2019. Treatment was discontinued after a CT scan on 2019 showed a cavitary mass in the pelvis. A subsequent colonoscopy on 2019 showed submucosal inflammation in the sigmoid colon which was biopsied and shown to be mantle cell lymphoma. 6. Signed consent for LYM 39684 on 10/15/2019. This is a phase 2 study of [id number], a PI3K inhibitor, in relapsed or refractory mantle cell lymphoma. A. Restaging prior to starting treatment on LYM [id number]:   i. Bone marrow biopsy, 10/22/2019, showed 50% cellularity with trilineage hematopoiesis and small lymphoid aggregates. Flow cytometry showed 10% clonal B cells consistent with mantle cell NHL. 46,XY[20]. ii. CT neck/chest/abdomen/pelvis, 10/23/2019, showed a 1.6 x 1.0 cm LN posterior to the left sternocleidomastoid muscle, a 2.3 x 1.5 cm AP window lymph node, a 2.3 x 1.4 cm left paratracheal lymph node, a 2.3 x 2.0 cm left hilar lymph node, a 3.2 x 2.0 cm subcarinal lymph node, a 3.0 x 3.5 cm left common iliac lymph node, a 2.2 x 1.9 cm left periaortic lymph node, a 5.6 x 2.6 cm hypodensity marginating the left iliac vessels, and a 10.0 x 7.7 cm cavitary mass in the pelvis   B. C1D1 was 10/30/2019. PMHx:  1. History of pleural effusion, post VATS. 2. COPD. 3. History of 1 cm left lower lobe pulmonary nodule which is not FDG avid on PET scan 13. 4. Tobacco abuse  5. Atrial fibrillation on a baby ASA  6. History of spontaneous pneumothorax  7. Osteoporosis  Previous Therapies  As above.     Current Therapy   OHC Hydration Cycle Length: 1 Number Cycles: 2 Start: C1D1 on 2019 Assoc Dx: Mantle cell lymphoma (disorder) LOT: Toxicity Management 2019 C1 D1  Sodium Chloride IV 0.9 %, .9 % 1000 mL, Dose modified  USOR 27232 Treatment B [id number] Q28D Cycle Length: 28 Number Cycles: 12 Start: C1D1 on 10/29/2019 Assoc Dx: Mantle cell lymphoma (disorder) LOT: 4th Line Stage: IVB 2019 C10 D1  [id number] invest Oral, 2.5 mg [id number] invest Oral, 20 mg  Zoledronic acid (Reclast) Q12M Cycle Length: 365 Number Cycles: 1 Start: Kurt Zamudio on 2020 Assoc Dx: Osteoporosis (disorder) LOT: Other 2020 C1 D1  Reclast (Zoledronic Acid-Mannitol IV),  mg  Interval History   The patient is a 78-year-old male who is here today for follow up of mantle cell NHL. Started Ibrutinib on 10/04/2014. The patient was seen ahead of schedule on 2019 for cough and dyspnea on exertion. A PE protocol CT scan performed the same day showed no evidence of a pulmonary embolism, but he did have a new right-sided pneumothorax. He was seen by Dr. Michel Gray who followed him conservatively. By 2019, a chest x-ray showed resolution of the pneumothorax. He had a CT scan of the chest/abdomen/pelvis performed on 2019, which showed a thick-walled cavitary mass in the pelvis marginating the sigmoid colon. This was felt to represent a pelvic abscess due to perforated diverticulitis. He was started on Augmentin. He saw Dr. Nicky Calvo who recommended a colonoscopy because the patient lacked the typical symptoms of a diverticular abscess. A colonoscopy with Dr. Christine Bond was performed on 2019, which showed moderate diverticulosis of the sigmoid colon, a 3 mm descending colon polyp, and submucosal inflammation of a sigmoid polyp that was biopsied. Pathology showed mantle cell lymphoma. The patient signed consent for LYM [id number], which is a phase 2 study of [id number], a PI3K inhibitor, in relapsed or refractory mantle cell lymphoma on 10/15/2019. He started treatment with study drug ([id number]) on 10/30/2019. Today is cycle 8, day 1. He last had a CT scan of the neck/chest/abdomen/pelvis performed on 2020, which showed stable findings (see below). When the patient was seen prior to cycle #10 on 2020, study drug was held due to diarrhea. Following that, he returned to the office on 2020 with diffuse erythema.  He was felt to have exfoliative dermatitis due to his study drug. Since then he has lost skin in the shower. It has clogged the drain. Today, he is shivering. He is tachypneic. He is painful. He has erythema and peeling of the skin from head to toe. Review of Systems  Per interval history; otherwise 10 point ROS is negative   Vital Signs  Blood pressure: 118/68, L arm, Regular, Pulse: 68, Temperature: 97.8 F, Respirations: 18, O2 sat: , Pain Scale: 5, Height: 73 in, Weight: 145 lb, BSA: 1.84, BMI: 19.13 kg/m2   Physical Exam   CONSTITUTIONAL: awake, alert, cooperative, no apparent distress   EYES: pupils equal, round and reactive to light, sclera clear and conjunctiva normal   ENT: normocephalic, without obvious abnormality, atraumatic   NECK: supple, symmetrical, no jugular venous distension and no carotid bruits   HEMATOLOGIC/LYMPHATIC: no cervical, supraclavicular or axillary lymphadenopathy   LUNGS: no increased work of breathing. distant breath sounds. +wheeze in the left lung base. CARDIOVASCULAR: regular rate and rhythm, normal S1 and S2, no murmur noted   ABDOMEN: normal bowel sounds x 4, soft, non-distended, non-tender, no masses palpated, no hepatosplenomegaly   MUSCULOSKELETAL: full range of motion noted, tone is normal   NEUROLOGIC: awake, alert, oriented to name, place and time. Motor skills grossly intact. SKIN: +erythematous rash bilateral forearms with some scaling/scabbing. Normal texture, turgor and no jaundice.  appears intact   EXTREMITIES: no LE edema   Labs  CBC   Lab Results 07/14/2020 07/08/2020 07/07/2020 06/09/2020 05/12/2020 04/14/2020   CBC                     WBC x 10^3/uL 10.3 (H)    8.7 6.3 6.1 5.3   RBC x 10^6/uL 3.39 (L)    3.91 (L) 3.72 (L) 3.98 (L) 3.80 (L)   HGB g/dL 10.3 (L)    11.8 (L) 11.1 (L) 11.9 (L) 11.5 (L)   HCT % 29.8 (L)    34.6 (L) 33.4 (L) 35.9 (L) 34.1 (L)   MCV fL 87.9    88.5 89.8 90.2 89.7   MCH pg 30.4    30.2 29.8 29.9 30.3   MCHC g/dL 34.6    34.1 33.2 33.1 33.7 RDW-CV, % 16.0 (H)    16.6 (H) 15.9 (H) 16.1 (H) 16.4 (H)   PLT x 10^3/uL 252.0    265.0 258.0 292.0 211.0   Willard % 59.3    36.0 53.2 61.4 53.3   LY % 33.6    48.9 27.1 17.4 (L) 25.3   MO % 6.6    7.0 8.3 8.3 9.8   EO % 0.3 (L)    7.6 (H) 11.1 (H) 12.2 (H) 10.5 (H)   BA % 0.2    0.5 0.3 0.7 1.1   Willard # (ANC) x 10^3/uL 6.09 (H)    3.14 3.35 3.72 2.84   LY # x 10^3/uL 3.45    4.26 (H) 1.71 1.05 (L) 1.35   MO # x 10^3/uL 0.68    0.61 0.52 0.50 0.52   EO # x 10^3/uL 0.03 (L)    0.66 (H) 0.70 (H) 0.74 (H) 0.56 (H)   BA # x 10^3/uL 0.02    0.04 0.02 0.04 0.06   CMP   Lab Results 07/14/2020 07/08/2020 07/07/2020 06/09/2020 05/12/2020 04/14/2020   Chemistries                     Glucose mg/dL 166 (H)    121 (H) 127 (H) 104 102   BUN mg/dL 18    12 9 11 12   Creatinine mg/dL 1.6 (H)    1.1 1.2 1.2 1.1   Sodium mmol/L 134    140 136 141 141   Potassium mmol/L 4.3    4.7 4.2 4.6 4.1   Chloride mmol/L 106    110 (H) 106 108 105   CO2 mmol/L 18    22 25 27 25   Calcium mg/dL 6.9 (L)    8.9 8.7 8.7 9.3   Albumin g/dL 2.4 (L)    2.8 (L) 2.6 (L) 3.0 (L) 3.4   Total protein g/dL 4.5 (L)    5.4 (L) 4.8 (L) 5.6 (L) 5.9 (L)   Bilirubin, total mg/dL 0.6    0.7 0.5 0.6 0.6   Alkaline phosphatase U/L 84    106 87 110 110   AST/SGOT U/L 27    25 41 (H) 30 25   ALT/SGPT U/L 30    32 33 33 29   GFR non-, estimated mL/min/1.73m2 42.5 (L)    >60.0 59.2 (L) 59.2 (L) >60.0   GFR African American, estimated mL/min/1.73m2 51.4 (L)    >60.0 >60.0 >60.0 >60.0   Imaging  CT CHEST ABDOMEN PELVIS W CONTRAST (2/18/2019): Impression  Stable mediastinal, axillary, and external iliac lymph nodes. No interval  lymph node enlargement or new enlarged lymph nodes in the chest, abdomen, and  pelvis      CT CHEST ABDOMEN PELVIS W CONTRAST (8/29/2019): Impression  Interval development of thick walled cavitary mass in the pelvis marginating  the sigmoid colon.  While this likely represents developing pelvic abscess  from perforated diverticulitis, necrotic adenopathy could also have this  appearance. Increased adenopathy along the proximal left common iliac., presumably  related to the history of lymphoma. Hypodense focus is seen anterior to the left external iliac artery, new from  prior, measuring up to 4.4 cm x 2.1 cm. . This could represent developing  low-density lymph nodes or possibly fluid    Stable index pulmonary nodule right middle lobe. Stable mediastinal  adenopathy in the chest.       CT NECK/CHEST/ABDOMEN/PELVIS (10/23/2019): Impression  Scattered adenopathy in the neck, increased from 2015    Increased adenopathy within the chest. No change in triangular nodule right  middle lobe    Increasing adenopathy in abdomen and pelvis. No obvious change in thick walled cavitary mass marginating the sigmoid  colon. Differential is unchanged    Tiny bilateral nonobstructing renal calculi      CT NECK CHEST ABDOMEN PELVIS W CONTRAST (12/19/2019): Impression  Findings are consistent with a positive interval treatment response of the  patient's lymphoma, with interval improvement of target and non-target  lesions, as fully described above. MRI LUMBAR SPINE WO CONTRAST (1/23/2020): Impression  Acute to subacute fracture of the L5 vertebral body with loss of 70% of the  vertebral body height. The fracture is most likely  osteoporotic/posttraumatic in nature. Mild central canal stenosis at L4-L5 eccentric to the right. Moderate right  foraminal stenosis and mild left foraminal stenosis is noted at this level. CT NECK CHEST ABDOMEN PELVIS W CONTRAST (2/13/2020): Impression  Other than slight decrease in size of cavitary mass medial to the sigmoid  colon, no significant change in neck, chest, abdomen, and pelvis CT. CT SOFT TISSUE NECK W CONTRAST (4/13/2020): Impression  Regressing, small cervical lymph nodes. CT CHEST ABDOMEN PELVIS W CONTRAST (4/13/2020): Impression  1.  Stable mediastinal/hilar adenopathy. 2. Enlarging right middle lobe pulmonary nodule. Developing concomitant  bronchogenic carcinoma is not excluded. PET scan and/or short-term CT  follow-up recommended for further evaluation. 3. Continued decrease in size of the artemio sigmoidal soft tissue mass. 4. Stable retroperitoneal/left pelvic borderline lymph node enlargement. CT SOFT TISSUE NECK W CONTRAST (6/08/2020): Impression  Stable small lymph nodes in the neck      CT CHEST ABDOMEN PELVIS W CONTRAST (6/08/2020): Impression  1. Minimal interval increase of several mediastinal lymph nodes. Continued  CT surveillance recommended. 2. Stable right middle lobe indeterminate nodule. 3. Stable CT of the abdomen and pelvis. OCM - Patient Care Management   Date of Service: 07/14/2020  Pain Scale (0-10): 5  Pain Treatment Plan: NOT TAKING ANYTHING FOR PAIN  Comment:             ECOG Status 3 In bed >50% of the time. Capable of only limited self-care, confined to bed or chair more than 50% of waking hours. (Date: 07/14/2020)   Depression Status Was screened; Outcome positive: No; Screening Date: 07/07/2020; Screening Tool: Patient Health Questionnaire (PHQ9)   Psycho-social PHQ-9 Follow-up Plan (if applicable):      Research    Would you like this patient screened for clinical trial eligibility? If yes, please enter the order for \"Research: Screen for Eligibility\"    Assessment & Plan  1. Mantle cell NHL - Diagnosed at the time of an admission to [facility] for SOB between 12/06 - 12/11/2012. Had a CT scan on the day of admission that showed a large right pneumothorax and mediastinal and axillary LAD. A CT scan the following day showed retroperitoneal and inguinal lymphadenopathy as well. He did require a chest tube. Pleural fluid was negative. Had an excisional biopsy of a right inguinal LN that showed mantle cell NHL. A subsequent bone marrow biopsy on 12/13/2012 showed extensive involvement by mantle cell NHL.     Received R-CHOP x 6 cycles between 12/26/2012 - 4/10/2013, then high-dose BEAM with stem-cell rescue on 6/13/2013. Had a CR. Followed until a CT scan on 9/15/2014 showed interval growth of axillary and infraclavicular adenopathy. A CT-guided biopsy on 9/26/2014 showed mantle cell NHL. Received Ibrutinib between 10/04/2014 - 10/11/2019. Treatment was discontinued after a CT scan on 8/29/2019 showed a cavitary mass in the pelvis. A subsequent colonoscopy on 9/25/2019 showed submucosal inflammation in the sigmoid colon which was biopsied and shown to be mantle cell lymphoma     He signed consent for LYM [id number], which is a phase 2 study of [id number], a PI3K inhibitor, in relapsed or refractory mantle cell lymphoma on 10/15/2019. He started treatment with [id number] on 10/20/2019. When he was seen prior to cycle #10 on 7/07/2020, study drug was held due to diarrhea. [id number] is taken orally once daily with water without regard to food. He last underwent imaging with a CT of the neck/chest/abdomen/pelvis on 6/08/2020. This showed stable disease with [id number]. 2. PCP prophylaxis - Takes Bactrim DS on Mon, Wed, Fri of each week. 3. Drug-induced Rash - Had been grade 1 and was manged by Triamcinolone. Then he developed exfoliative dermatitis on 7/09/2020.    4. Osteoporosis with L5 vertebra body compression fracture - MRI of the lumbar spine on 1/23/2020 showed an L5 vertebral body compression fracture. A follow up DEXA scan on 3/09/2020 showed T-score -3.4 in the lumbar spine and -2.7 in the LFN. Prescribed Fosamax on 3/17/2020. He informed me that he quit taking it due to intolerance of side effects on 5/12/2020. Gave Reclast #1 on 6/09/2020.     5. RML nodule - An 8 mm RML pulm nodule was first brought to light on a CT scan performed on 4/13/2020. In retrospect it was present previously. For instance it was present on a CT of the chest on 6/16/2017 and measure 4-5 mm at that time.  A slowly growing NSCLC is in the differential.    6. Anorexia - Prescribed Megace on 5/12/2020. This was not received by the pharmacy. Will resend. 7. Loose stools - Prescribed Lomotil on 5/12/2020.     8. Bilateral ankle swelling - Will check a duplex ultrasound of the LE. If neg, then try GCS. 9. Plan - Will refer to the ER. He will require inpatient management for chemo-induced diarrhea and grade 3 exfoliative dermatitis. End of study. .  Recent imaging and labs were reviewed and discussed with the patient. I have recommended that the patient follow CDC guidelines for prevention of COVID-19 infection.     Raúl Barlow MD     Note Recipient:      Electronically signed by Raúl Barlow MD 07/14/2020 14:26 EDT

## 2020-07-15 NOTE — PROGRESS NOTES
Perfectserve sent to Luci Galindo CNP: pt here for dermatitis. not here for cardiac issues. his skin is peeling off in layers. tele leads keep coming off and peeling his skin off. can we d/c tele please? also his last lactic was 2.8. ok if I add on a lactic with his morning labs?       0356: NP replied to pass on to day shift. Will continue to monitor.

## 2020-07-15 NOTE — CONSULTS
[facility] Nurse  Consult Note       NAME:  Bob Mcburney  MEDICAL RECORD NUMBER:  [id number]  AGE: 77 y.o. GENDER: male  : 1945  TODAY'S DATE:  7/15/2020    Subjective  Wife reports she has used angelic products, tea oil, and other lotions at home. Reason for WOCN Evaluation and Assessment: dermatitis, peeling skin      Bob Mcburney is a 77 y.o. male referred by:   [x] Physician  [x] Nursing  [] Other:     Wound Identification:  Wound Type:   skin tear left forearm and elbow. Exfoliative dermatitis - Dry peeling skin on 95% of body, MD reports secondary to chemotherapy (ADR).   Contributing Factors: edema, chronic pressure, decreased mobility, shear force and Recent chemotherapy with side effects of dry skin    Wound History:   Current Wound Care Treatment: open to air     Patient Goal of Care:  [x] Wound Healing  [] Odor Control  [] Palliative Care  [] Pain Control   [x] Other: skin moisturizer      PAST MEDICAL HISTORY        Diagnosis Date    Blood transfusion     Cancer ([address]     basal and squamous cell skin ca    COPD, mild ([address]     Dermatitis 2020    Disease of blood and blood forming organ     History of blood transfusion     Medical history reviewed with no changes     Non Hodgkin's lymphoma ([address] 12    chemo started 12/26/12x6, then stem cell tx    Pneumonia     lymphoma, pneumo history    Pneumothorax Dec. 2012    Spontaneous pneumothorax     x 3 last 11/3/2011       PAST SURGICAL HISTORY    Past Surgical History:   Procedure Laterality Date    CHEST TUBE INSERTION      x3    COLONOSCOPY  4/    polyps q 5 yrs    COLONOSCOPY  5/15/13    FEMUR SURGERY      r/t fracture with retained hardware    FRACTURE SURGERY      broken arm    FRACTURE SURGERY      femur    LYMPH NODE BIOPSY  12    right inguinal lymph node excision and biopsy    MIDDLE EAR SURGERY      OTHER SURGICAL HISTORY  12    stem cell transplant    SKIN BIOPSY  SKIN CANCER DESTRUCTION      STOMACH SURGERY      at age 7 weeks    THORACOSCOPY Left 2018    video assisted thoracoscopy,left upper lobe wedge resection;mechanical and chemical pleurodesis    TUNNELED VENOUS PORT PLACEMENT         FAMILY HISTORY    Family History   Problem Relation Age of Onset    High Blood Pressure Father     Stroke Father 68    Alcohol Abuse Sister     Liver Disease Sister          age 54    Cancer Brother     COPD Brother     Cancer Brother         Brain / Lung       SOCIAL HISTORY    Social History     Tobacco Use    Smoking status: Current Every Day Smoker     Packs/day: 1.00     Years: 50.00     Pack years: 50.00     Types: Cigarettes    Smokeless tobacco: Never Used    Tobacco comment: 1-2 cigs daily - 3/5/20   Substance Use Topics    Alcohol use: Yes     Alcohol/week: 0.0 standard drinks     Comment: 8 pack beer weekly    Drug use: No       ALLERGIES    Allergies   Allergen Reactions    Latex Rash    Adhesive Tape      Rash       MEDICATIONS    No current facility-administered medications on file prior to encounter.       Current Outpatient Medications on File Prior to Encounter   Medication Sig Dispense Refill    INCRUSE ELLIPTA 62.5 MCG/INH AEPB INHALE 1 PUFF INTO THE LUNGS DAILY 1 each 5    traZODone (DESYREL) 50 MG tablet TAKE 1 TO 2 TABLETS BY MOUTH EVERY NIGHT AT BEDTIME FOR SLEEP 60 tablet 5    Sulfamethoxazole-Trimethoprim (BACTRIM PO) Take by mouth DUE TO A RESEARCH STUDY      NONFORMULARY RESEARCH MEDICATION - BLINDED TO PATIENT      diltiazem (CARTIA XT) 120 MG extended release capsule Take 1 capsule by mouth daily 90 capsule 3    albuterol sulfate HFA (PROAIR HFA) 108 (90 Base) MCG/ACT inhaler Inhale 2 puffs into the lungs every 6 hours as needed for Wheezing or Shortness of Breath 1 Inhaler 5    megestrol (MEGACE) 40 MG tablet Take 200 mg by mouth 2 times daily      hydrOXYzine (ATARAX) 25 MG tablet Take 1 tablet by mouth 2 times daily as needed for Anxiety 30 tablet 1    Multiple Vitamins-Iron (MULTIVITAMIN/IRON PO) Take 1 tablet by mouth daily          Objective  Lying in bed. Wife at bed side. /61   Pulse 97   Temp 98 °F (36.7 °C)   Resp 20   Ht 6' (1.829 m)   Wt 143 lb 1.6 oz (64.9 kg)   SpO2 97%   BMI 19.41 kg/m²     LABS:  WBC:    Lab Results   Component Value Date    WBC 7.1 07/15/2020     H/H:    Lab Results   Component Value Date    HGB 8.8 07/15/2020    HCT 26.4 07/15/2020     PTT:    Lab Results   Component Value Date    APTT 40.5 06/24/2013   [APTT}  PT/INR:    Lab Results   Component Value Date    PROTIME 11.7 06/16/2017    INR 1.04 06/16/2017     HgBA1c:    Lab Results   Component Value Date    LABA1C 4.9 01/05/2018       Assessment  2 open skin tears on the left fore arm and elbow. The skin has turned black. Wound bed pale pink. Dry flaky skin on 95% of body. Photo taken of lower legs and the skin is like that through out body.   See photo's   Paul Risk Score: Paul Scale Score: 19    Patient Active Problem List   Diagnosis    Tobacco abuse disorder    Primary spontaneous pneumothorax    COPD, severe ([address]    Autologous bone marrow transplantation    Mantle cell lymphoma of lymph nodes of multiple sites ([address]    Encounter for long-term (current) use of high-risk medication    Chest pain    Spontaneous pneumothorax    PAF (paroxysmal atrial fibrillation) (HCC)    Typical atrial flutter ([address]    Cigarette smoker    Dermatitis       Measurements:  Wound 07/15/20 Radial Left open pink wound bed, black skin from deroofed bulla present (Active)   Wound Image   07/15/20 1537   Wound Skin Tear 07/15/20 1537   Dressing Status Changed 07/15/20 1537   Dressing Changed Changed/New 07/15/20 1537   Dressing/Treatment Silicone XQDQAHFM;0F5;QBUR gauze 07/15/20 1537   Wound Cleansed Rinsed/Irrigated with saline 07/15/20 1537   Dressing Change Due 07/16/20 07/15/20 1537   Wound Length (cm) 6 cm 07/15/20 1537   Wound Width (cm) 4 cm 07/15/20 1537   Wound Depth (cm) 0.1 cm 07/15/20 1537   Wound Surface Area (cm^2) 24 cm^2 07/15/20 1537   Wound Volume (cm^3) 2.4 cm^3 07/15/20 1537   Distance Tunneling (cm) 0 cm 07/15/20 1537   Tunneling Position ___ O'Clock 0 07/15/20 1537   Undermining Starts ___ O'Clock 0 07/15/20 1537   Undermining Ends___ O'Clock 0 07/15/20 1537   Undermining Maxium Distance (cm) 0 07/15/20 1537   Wound Assessment Pale;Pink 07/15/20 1537   Drainage Amount Scant 07/15/20 1537   Drainage Description Serous 07/15/20 1537   Odor None 07/15/20 1537   Margins Unattached edges 07/15/20 1537   [place]%Wound Bed 100 07/15/20 1537   Culture Taken No 07/15/20 1537   Number of days: 0      Left forearm:           Wound 07/15/20 Elbow Left;Proximal pink wound bed, black skin from deroofed bulla (Active)   Wound Image   07/15/20 1537   Wound Skin Tear 07/15/20 1537   Dressing Status Changed 07/15/20 1537   Dressing Changed Changed/New 07/15/20 1537   Dressing/Treatment Silicone QQYYGOMH;3C2;NAEK gauze 07/15/20 1537   Wound Cleansed Rinsed/Irrigated with saline 07/15/20 1537   Dressing Change Due 07/16/20 07/15/20 1537   Wound Length (cm) 2 cm 07/15/20 1537   Wound Width (cm) 2 cm 07/15/20 1537   Wound Depth (cm) 0.1 cm 07/15/20 1537   Wound Surface Area (cm^2) 4 cm^2 07/15/20 1537   Wound Volume (cm^3) 0.4 cm^3 07/15/20 1537   Tunneling Position ___ O'Clock 0 07/15/20 1537   Undermining Starts ___ O'Clock 0 07/15/20 1537   Undermining Ends___ O'Clock 0 07/15/20 1537   Undermining Maxium Distance (cm) 0 07/15/20 1537   Wound Assessment Pale;Pink 07/15/20 1537   Drainage Amount Scant 07/15/20 1537   Drainage Description Serous 07/15/20 1537   Odor None 07/15/20 1537   Margins Unattached edges 07/15/20 1537   Lorena-wound Assessment Dark edges;Edema; Red 07/15/20 1537   Non-staged Wound Description Partial thickness 07/15/20 1537   [place]%Wound Bed 100 07/15/20 1537   Culture Taken No 07/15/20 1537   Number of days: 0     Left

## 2020-07-16 NOTE — ONCOLOGY
ONCOLOGY HEMATOLOGY CARE PROGRESS NOTE      SUBJECTIVE:     Afebrile and on room air. 2 loose stools overnight. Overall, he feels that he is improving. ROS:   The remaining 10 point review of symptoms is unremarkable. OBJECTIVE        Physical    VITALS:  /61   Pulse 97   Temp 97.4 °F (36.3 °C) (Oral)   Resp 16   Ht 6' (1.829 m)   Wt 143 lb 1.6 oz (64.9 kg)   SpO2 96%   BMI 19.41 kg/m²   TEMPERATURE:  Current - Temp: 97.4 °F (36.3 °C); Max - Temp  Av.9 °F (36.6 °C)  Min: 97.4 °F (36.3 °C)  Max: 98.9 °F (37.2 °C)  PULSE OXIMETRY RANGE: SpO2  Av.9 %  Min: 95 %  Max: 98 %  24HR INTAKE/OUTPUT:      Intake/Output Summary (Last 24 hours) at 2020 0750  Last data filed at 2020 1296  Gross per 24 hour   Intake 2786 ml   Output 700 ml   Net 2086 ml       CONSTITUTIONAL:  awake, alert, cooperative, no apparent distress, HEENT oral pharynx , no scleral icterus  HEMATOLOGIC/LYMPHATICS:  no cervical lymphadenopathy, no supraclavicular lymphadenopathy, no axillary lymphadenopathy and no inguinal lymphadenopathy  LUNGS:  No increased work of breathing, good air exchange, clear to auscultation bilaterally, no crackles or wheezing  CARDIOVASCULAR:  , regular rate and rhythm, normal S1 and S2, no S3 or S4, and no murmur noted  ABDOMEN:  No scars, normal bowel sounds, soft, non-distended, non-tender, no masses palpated, no hepatosplenomegally  MUSCULOSKELETAL:  There is no redness, warmth, or swelling of the joints. EXTREMETIES: No clubbing cynosis or edema  NEUROLOGIC:  Awake, alert, oriented to name, place and time. Cranial nerves II-XII are grossly intact. Motor is 5 out of 5 bilaterally.    SKIN:  no bruising or bleeding      Data      Recent Labs     20  1441 07/15/20  0555 20  0655   WBC 10.1 7.1 9.9   HGB 10.6* 8.8* 9.1*   HCT 31.5* 26.4* 27.4*    150 241   MCV 91.2 90.6 90.4        Recent Labs     20  1441 07/15/20  0555 07/16/20  0655   * 132* 138   K 4.5 5.9* 4.3    108 110   CO2 15* 17* 20*   PHOS  --  2.4* 1.9*   BUN 20 17 18   CREATININE 1.6* 1.1 0.9     Recent Labs     07/14/20  1441   AST 18   ALT 22   BILITOT 0.3   ALKPHOS 80       Magnesium:    Lab Results   Component Value Date    MG 1.90 01/10/2018    MG 2.20 01/09/2018    MG 1.50 01/09/2018         Problem List  Patient Active Problem List   Diagnosis    Tobacco abuse disorder    Primary spontaneous pneumothorax    [facility], [facility] [address]    Autologous bone marrow transplantation    Mantle cell lymphoma of lymph nodes of multiple sites ([facility] [address]    Encounter for long-term (current) use of high-risk medication    Chest pain    Spontaneous pneumothorax    PAF (paroxysmal atrial fibrillation) (HCC)    Typical atrial flutter (HCC)    Cigarette smoker    Dermatitis       ASSESSMENT AND PLAN    NHL Mantle Cell with drug rash   - Study drug permanently discontinued for grade 3 rash  - Cont supportive care with foamy cleanser then emollient (Aquaphor) and solumedrol 40 every 8 hours   - Cont Bactrim DS MWF per protocol and PCP prevention      Anemia  - transfuse to keep over 7- now 8.8      Diarrhea  - 2 loose stools overnight.   - This is improving.     Pain  -improved, no changes        ONCOLOGIC DISPOSITION:  TBEDDIE Horn MD  Please contact through [facility]

## 2020-07-16 NOTE — PROGRESS NOTES
Hospitalist Progress Note      PCP: Tj Miller MD    Date of Admission: 7/14/2020    Chief Complaint: skin peeling    Subjective: no new c/o. Medications:  Reviewed    Infusion Medications     Scheduled Medications    hydrocortisone   Topical BID    sulfamethoxazole-trimethoprim  1 tablet Oral Once per day on Mon Wed Fri    mineral oil-hydrophilic petrolatum   Topical BID    dilTIAZem  120 mg Oral Daily    tiotropium  2 puff Inhalation Daily    traZODone  100 mg Oral Nightly    methylPREDNISolone  40 mg Intravenous Q8H    sodium chloride flush  10 mL Intravenous 2 times per day    enoxaparin  40 mg Subcutaneous Daily    megestrol  200 mg Oral BID     PRN Meds: diphenhydrAMINE, albuterol, hydrOXYzine, sodium chloride flush, acetaminophen **OR** acetaminophen, polyethylene glycol, promethazine **OR** ondansetron, oxyCODONE-acetaminophen **OR** oxyCODONE-acetaminophen      Intake/Output Summary (Last 24 hours) at 7/16/2020 0745  Last data filed at 7/16/2020 7459  Gross per 24 hour   Intake 2786 ml   Output 700 ml   Net 2086 ml       Physical Exam Performed:    /61   Pulse 97   Temp 97.4 °F (36.3 °C) (Oral)   Resp 16   Ht 6' (1.829 m)   Wt 143 lb 1.6 oz (64.9 kg)   SpO2 96%   BMI 19.41 kg/m²     General appearance: No apparent distress, appears stated age and cooperative. HEENT: Pupils equal, round, and reactive to light. Conjunctivae/corneas clear. Neck: Supple, with full range of motion. No jugular venous distention. Trachea midline. Respiratory:  Normal respiratory effort. Clear to auscultation, bilaterally without Rales/Wheezes/Rhonchi. Cardiovascular: Regular rate and rhythm with normal S1/S2 without murmurs, rubs or gallops. Abdomen: Soft, non-tender, non-distended with normal bowel sounds. Musculoskeletal: No clubbing, cyanosis or edema bilaterally. Full range of motion without deformity. Skin: Skin color, texture, turgor normal.  No rashes or lesions.   Neurologic: Neurovascularly intact without any focal sensory/motor deficits. Cranial nerves: II-XII intact, grossly non-focal.  Psychiatric: Alert and oriented, thought content appropriate, normal insight  Capillary Refill: Brisk,< 3 seconds   Peripheral Pulses: +2 palpable, equal bilaterally       Labs:   Recent Labs     07/14/20  1441 07/15/20  0555 07/16/20  0655   WBC 10.1 7.1 9.9   HGB 10.6* 8.8* 9.1*   HCT 31.5* 26.4* 27.4*    150 241     Recent Labs     07/14/20  1441 07/15/20  0555 07/16/20  0655   * 132* 138   K 4.5 5.9* 4.3    108 110   CO2 15* 17* 20*   BUN 20 17 18   CREATININE 1.6* 1.1 0.9   CALCIUM 6.5* 5.5* 6.0*   PHOS  --  2.4* 1.9*     Recent Labs     07/14/20  1441   AST 18   ALT 22   BILITOT 0.3   ALKPHOS 80     No results for input(s): INR in the last 72 hours. Recent Labs     07/14/20  1441   TROPONINI <0.01       Urinalysis:      Lab Results   Component Value Date    NITRU Negative 06/24/2013    WBCUA 0-2 06/24/2013    BACTERIA Few 05/08/2013    RBCUA 10-20 06/24/2013    BLOODU SMALL 06/24/2013    SPECGRAV 1.015 06/24/2013    GLUCOSEU Negative 06/24/2013    GLUCOSEU NEGATIVE 11/30/2011       Consults:    IP CONSULT TO HOSPITALIST  IP CONSULT TO ONCOLOGY      Assessment/Plan:    Active Hospital Problems    Diagnosis    Dermatitis [L30.9]    PAF (paroxysmal atrial fibrillation) ([facility] [address] [I48.0]    COPD, severe ([facility] [address] [J44.9]       Dermatitis - Exfoliative, likely 2nd to chemo ADR. Started on Solumedrol in ED. Heme/Onc consulted from ED and appreciated in advance. Given dose of Vancomycin in ED - not continued w/out evidence of infection.     Lymphoma - NHL Mantle Cell, currently in drug tx trial - currently held. Heme/Onc consulted and appreciated.      Anemia - etiology clinically unable to determine, w/out evidence of active bleeding/hemolysis. Stable and asymptomatic w/out indication for transfusion. Follow serial labs.   Reviewed and documented as above.     ARF - w/out elevated BUN/Cr ratio but w/ hx c/w pre-renal azotemia. Given IVF hydration and follow serial labs - improving and stable. Reviewed and documented as above.     HypoNatremia - etiology clinically unable to determine but likely hypovolemic. Follow serial labs on IVF. Reviewed and documented as above.     COPD - w/out chronic respiratory failure on no baseline home O2. Controlled on home medication regimen - continued.         DVT Prophylaxis: LMWH  Diet: DIET GENERAL;  Code Status: DNR-CCA      PT/OT Eval Status: not yet ordered.      Dispo - likely here several days pending Heme/Onc recs and clinical course.        Romeo Oakes MD

## 2020-07-16 NOTE — PLAN OF CARE
Problem: Falls - Risk of:  Goal: Will remain free from falls  Description: Will remain free from falls  7/16/2020 1333 by Jefferson Arora RN  Outcome: Ongoing  7/16/2020 1045 by Erin Mao RN  Outcome: Ongoing  7/16/2020 1044 by Erin Mao RN  Outcome: Ongoing  Goal: Absence of physical injury  Description: Absence of physical injury  7/16/2020 1333 by Jefferson Arora RN  Outcome: Ongoing  7/16/2020 1045 by Erin Mao RN  Outcome: Ongoing  7/16/2020 1044 by Erin Mao RN  Outcome: Ongoing     Problem: Pain:  Goal: Pain level will decrease  Description: Pain level will decrease  7/16/2020 1333 by Jefferson Arora RN  Outcome: Ongoing  7/16/2020 1045 by Erin Mao RN  Outcome: Ongoing  7/16/2020 1044 by Erin Mao RN  Outcome: Ongoing  Goal: Control of acute pain  Description: Control of acute pain  7/16/2020 1333 by Jefferson Arora RN  Outcome: Ongoing  7/16/2020 1045 by Erin Mao RN  Outcome: Ongoing  7/16/2020 1044 by Erin Mao RN  Outcome: Ongoing  Goal: Control of chronic pain  Description: Control of chronic pain  7/16/2020 1333 by Jefferson Arora RN  Outcome: Ongoing  7/16/2020 1045 by Erin Mao RN  Outcome: Ongoing  7/16/2020 1044 by Erin Mao RN  Outcome: Ongoing

## 2020-07-17 NOTE — PROGRESS NOTES
Transfer to [address] [place] [address] from [address]. Jazmin Simpson and Zohra Sargent RN performed complete skin assessment on transfer. Assessment revealed dry flaky skin and indention's on upper ears from mask . Upon transferring patient to ordered level of care, patients medications were gathered from the following locations and given to receiving nurse during bedside report:    Lock Box in room :     [x] Yes   [] No   Pyxis Bin:       [x] Yes   [] No     Pyxis Refrigerator:      [x] Yes   [] No   Tube System:       [x] Yes   [] No   LDA's documented:  [x] Yes   [] No          The following paperwork was transferred with patient:       12 hour chart check:       [x] Yes   [] No   Patient belongings:       [x] Yes   [] No      Continuous pulse ox:   [] Yes   [] No      [x] N/A     MD notified via [employer]:   [x] Yes   [] No    Family notified of transfer:    [x] Yes   [] No    Spoke with: Pt spoke with family on phone.

## 2020-07-17 NOTE — ONCOLOGY
* 138 141   K 5.9* 4.3 4.4    110 111*   CO2 17* 20* 22   PHOS 2.4* 1.9* 1.5*   BUN 17 18 16   CREATININE 1.1 0.9 0.8     Recent Labs     07/14/20  1441   AST 18   ALT 22   BILITOT 0.3   ALKPHOS 80       Magnesium:    Lab Results   Component Value Date    MG 1.90 01/10/2018    MG 2.20 01/09/2018    MG 1.50 01/09/2018         Problem List  Patient Active Problem List   Diagnosis    Tobacco abuse disorder    Primary spontaneous pneumothorax    COPD, severe ([facility] [address]    Autologous bone marrow transplantation    Mantle cell lymphoma of lymph nodes of multiple sites ([facility] [address]    Encounter for long-term (current) use of high-risk medication    Chest pain    Spontaneous pneumothorax    PAF (paroxysmal atrial fibrillation) (HCC)    Typical atrial flutter (HCC)    Cigarette smoker    Dermatitis       ASSESSMENT AND PLAN    NHL Mantle Cell with drug rash   - Study drug permanently discontinued for grade 3 rash  - Cont supportive care with foamy cleanser then emollient (Aquaphor) and solumedrol 40 every 8 hours   - Cont Bactrim DS MWF per protocol and PCP prevention      Anemia  - transfuse to keep over 7- now 9.4      Diarrhea  - This is improving.     Pain  -improved, no changes        ONCOLOGIC DISPOSITION:  TBD, needs to be on bactrim on DS- will stop in the outpatient setting per research protocol.      Ksenia Fox  [facility]   Please contact through [employer]

## 2020-07-17 NOTE — PROGRESS NOTES
Patient with history of atrial fibrillation. Flipped into afib, HR 130s-140s. 1x order for 10mg IVP cardizem ordered by Clay Gonzales, NP and administered. Patient asymptomatic with HR 90s.

## 2020-07-17 NOTE — PROGRESS NOTES
Transfer to [facility] from [address] [place] [address] and [facility] performed complete skin assessment on transfer. Assessment revealed dry, flaking skin, red areas. Upon transferring patient to ordered level of care, patients medications were gathered from the following locations and given to receiving nurse during bedside report:      Lock Box in room :     [x] Yes   [] No   Pyxis Bin:       [x] Yes   [] No      Pyxis Refrigerator:      [x] Yes   [] No   Tube System:       [x] Yes   [] No   LDA's documented:  [x] Yes   [] No          The following paperwork was transferred with patient:    Blue medication book:       [] Yes   [] No      12 hour chart check:       [] Yes   [] No   Patient belongings:       [x] Yes   [] No      Continuous pulse ox:   [] Yes   [x] No      [] N/A      No Tele monitor assigned to patient and placed on patient prior to transfer.     CMU Notified at Transfer: [x] Yes   [] No     Name of [facility] Staff:  Jose Francisco Humphrey MD notified via Perfect [employer]:   [x] Yes   [] No    Family notified of transfer:    [x] Yes   [] No

## 2020-07-17 NOTE — PLAN OF CARE
Problem: Falls - Risk of:  Goal: Will remain free from falls  Description: Will remain free from falls  7/16/2020 2327 by Kimberley Villaseñor RN  Outcome: Ongoing

## 2020-07-17 NOTE — PROGRESS NOTES
pre-renal azotemia. Given IVF hydration and follow serial labs - improving and stable. Reviewed and documented as above.     HypoNatremia - etiology clinically unable to determine but likely hypovolemic. Followed serial labs on IVF - resolved, now w/ IVF held and tolerating PO. Reviewed and documented as above.     COPD - w/out chronic respiratory failure on no baseline home O2. Controlled on home medication regimen - continued.         DVT Prophylaxis: LMWH  Diet: DIET GENERAL;  Code Status: DNR-CCA      PT/OT Eval Status: not yet ordered.      Dispo - possibly Sat/Sunday 18/19 July.         Henok Flood MD

## 2020-07-18 NOTE — ONCOLOGY
ONCOLOGY HEMATOLOGY CARE PROGRESS NOTE      SUBJECTIVE:     Skin has improved on steroids. Feeling better overall. ROS:   The remaining 10 point review of symptoms is unremarkable. OBJECTIVE        Physical    VITALS:  BP (!) 93/53   Pulse 92   Temp 97.7 °F (36.5 °C) (Oral)   Resp 18   Ht 6' (1.829 m)   Wt 146 lb 14.4 oz (66.6 kg)   SpO2 92%   BMI 19.92 kg/m²   TEMPERATURE:  Current - Temp: 97.7 °F (36.5 °C); Max - Temp  Av.6 °F (36.4 °C)  Min: 97.3 °F (36.3 °C)  Max: 97.9 °F (36.6 °C)  PULSE OXIMETRY RANGE: SpO2  Av %  Min: 92 %  Max: 97 %  24HR INTAKE/OUTPUT:      Intake/Output Summary (Last 24 hours) at 2020 0756  Last data filed at 2020 0510  Gross per 24 hour   Intake 670 ml   Output 1000 ml   Net -330 ml       CONSTITUTIONAL:  awake, alert, cooperative, no apparent distress, HEENT oral pharynx , no scleral icterus  HEMATOLOGIC/LYMPHATICS:  no cervical lymphadenopathy, no supraclavicular lymphadenopathy, no axillary lymphadenopathy and no inguinal lymphadenopathy  LUNGS:  No increased work of breathing, good air exchange, clear to auscultation bilaterally, no crackles or wheezing  CARDIOVASCULAR:  , regular rate and rhythm, normal S1 and S2, no S3 or S4, and no murmur noted  ABDOMEN:  No scars, normal bowel sounds, soft, non-distended, non-tender, no masses palpated, no hepatosplenomegally  MUSCULOSKELETAL:  There is no redness, warmth, or swelling of the joints. EXTREMETIES: No clubbing cynosis or edema  NEUROLOGIC:  Awake, alert, oriented to name, place and time. Cranial nerves II-XII are grossly intact. Motor is 5 out of 5 bilaterally.    SKIN:  no bruising or bleeding      Data      Recent Labs     20  0655 20  0400 20  0610   WBC 9.9 10.9 10.6   HGB 9.1* 9.4* 8.9*   HCT 27.4* 28.3* 26.7*    254 239   MCV 90.4 90.8 90.4        Recent Labs     20  0655 20  0400 20  0610    141 139   K 4.3 4.4 4.2    111* 110   CO2 20* 22 24   PHOS 1.9* 1.5* 1.3*   BUN 18 16 17   CREATININE 0.9 0.8 0.8     No results for input(s): AST, ALT, ALB, BILIDIR, BILITOT, ALKPHOS in the last 72 hours.     Magnesium:    Lab Results   Component Value Date    MG 1.90 01/10/2018    MG 2.20 01/09/2018    MG 1.50 01/09/2018         Problem List  Patient Active Problem List   Diagnosis    Tobacco abuse disorder    Primary spontaneous pneumothorax    COPD, severe ([facility] [address]    Autologous bone marrow transplantation    Mantle cell lymphoma of lymph nodes of multiple sites ([facility] [address]    Encounter for long-term (current) use of high-risk medication    Chest pain    Spontaneous pneumothorax    PAF (paroxysmal atrial fibrillation) (HCC)    Typical atrial flutter (HCC)    Cigarette smoker    Dermatitis       ASSESSMENT AND PLAN    NHL Mantle Cell with drug rash   - Study drug permanently discontinued for grade 3 rash  - Cont supportive care with foamy cleanser then emollient (Aquaphor) and solumedrol 40 every 8 hours   - Cont Bactrim DS MWF per protocol and PCP prevention      Anemia  - transfuse to keep over 7- now 8.9     Diarrhea  - This is improving.     Pain  -improved, no changes        Windy Morris MD

## 2020-07-18 NOTE — PROGRESS NOTES
Hospitalist Progress Note      PCP: Raffi Navarro MD    Date of Admission: 7/14/2020    Chief Complaint: skin peeling    Subjective: no new c/o. Medications:  Reviewed    Infusion Medications     Scheduled Medications    sulfamethoxazole-trimethoprim  1 tablet Oral Once per day on Mon Wed Fri    mineral oil-hydrophilic petrolatum   Topical BID    dilTIAZem  120 mg Oral Daily    tiotropium  2 puff Inhalation Daily    traZODone  100 mg Oral Nightly    methylPREDNISolone  40 mg Intravenous Q8H    sodium chloride flush  10 mL Intravenous 2 times per day    enoxaparin  40 mg Subcutaneous Daily    megestrol  200 mg Oral BID     PRN Meds: diphenhydrAMINE, albuterol, hydrOXYzine, sodium chloride flush, acetaminophen **OR** acetaminophen, polyethylene glycol, promethazine **OR** ondansetron, oxyCODONE-acetaminophen **OR** oxyCODONE-acetaminophen      Intake/Output Summary (Last 24 hours) at 7/18/2020 1227  Last data filed at 7/18/2020 1014  Gross per 24 hour   Intake 430 ml   Output 1000 ml   Net -570 ml       Physical Exam Performed:    /67   Pulse 79   Temp 98 °F (36.7 °C) (Oral)   Resp 18   Ht 6' (1.829 m)   Wt 146 lb 14.4 oz (66.6 kg)   SpO2 96%   BMI 19.92 kg/m²     General appearance: No apparent distress, appears stated age and cooperative. HEENT: Pupils equal, round, and reactive to light. Conjunctivae/corneas clear. Neck: Supple, with full range of motion. No jugular venous distention. Trachea midline. Respiratory:  Normal respiratory effort. Clear to auscultation, bilaterally without Rales/Wheezes/Rhonchi. Cardiovascular: Regular rate and rhythm with normal S1/S2 without murmurs, rubs or gallops. Abdomen: Soft, non-tender, non-distended with normal bowel sounds. Musculoskeletal: No clubbing, cyanosis or edema bilaterally. Full range of motion without deformity. Skin: Skin color, texture, turgor normal.  No rashes or lesions.   Neurologic:  Neurovascularly intact without any focal sensory/motor deficits. Cranial nerves: II-XII intact, grossly non-focal.  Psychiatric: Alert and oriented, thought content appropriate, normal insight  Capillary Refill: Brisk,< 3 seconds   Peripheral Pulses: +2 palpable, equal bilaterally       Labs:   Recent Labs     07/16/20  0655 07/17/20  0400 07/18/20  0610   WBC 9.9 10.9 10.6   HGB 9.1* 9.4* 8.9*   HCT 27.4* 28.3* 26.7*    254 239     Recent Labs     07/16/20  0655 07/17/20  0400 07/18/20  0610    141 139   K 4.3 4.4 4.2    111* 110   CO2 20* 22 24   BUN 18 16 17   CREATININE 0.9 0.8 0.8   CALCIUM 6.0* 6.2* 6.2*   PHOS 1.9* 1.5* 1.3*     No results for input(s): AST, ALT, BILIDIR, BILITOT, ALKPHOS in the last 72 hours. No results for input(s): INR in the last 72 hours. No results for input(s): Charlynne Bicker in the last 72 hours. Urinalysis:      Lab Results   Component Value Date    NITRU Negative 07/17/2020    WBCUA 0-2 06/24/2013    BACTERIA Few 05/08/2013    RBCUA 10-20 06/24/2013    BLOODU Negative 07/17/2020    SPECGRAV 1.025 07/17/2020    GLUCOSEU Negative 07/17/2020    GLUCOSEU NEGATIVE 11/30/2011       Consults:    IP CONSULT TO HOSPITALIST  IP CONSULT TO ONCOLOGY      Assessment/Plan:    Active Hospital Problems    Diagnosis    Dermatitis [L30.9]    PAF (paroxysmal atrial fibrillation) ([facility] [address] [I48.0]    COPD, severe ([facility] [address] [J44.9]       Dermatitis - Exfoliative, likely 2nd to chemo ADR. Started on Solumedrol in ED. Heme/Onc consulted from ED and appreciated in advance. Given dose of Vancomycin in ED - not continued w/out evidence of infection.     Lymphoma - NHL Mantle Cell, currently in drug tx trial - currently held. Heme/Onc consulted and appreciated.      Anemia - etiology clinically unable to determine, w/out evidence of active bleeding/hemolysis. Stable and asymptomatic w/out indication for transfusion. Follow serial labs.   Reviewed and documented as above.     ARF - w/out elevated BUN/Cr ratio but w/ hx c/w pre-renal azotemia. Given IVF hydration and follow serial labs - improving and stable. Reviewed and documented as above.     HypoNatremia - etiology clinically unable to determine but likely hypovolemic. Followed serial labs on IVF - resolved, now w/ IVF held and tolerating PO. Reviewed and documented as above.     COPD - w/out chronic respiratory failure on no baseline home O2. Controlled on home medication regimen - continued.         DVT Prophylaxis: LMWH  Diet: DIET GENERAL;  Code Status: DNR-CCA      PT/OT Eval Status: not yet ordered.      Dispo - d/c once ok from Hematology stand point.        Maddie Saenz MD

## 2020-07-18 NOTE — PROGRESS NOTES
Patient a/ox4. Dressing completed to LFA as ordered. Aquaphor applied to patient after shower. Patient denies needs, will monitor.

## 2020-07-19 NOTE — PLAN OF CARE
Pt is a fall risk but refuses bed alarm- calls out appropriately. Bed in lowest position with wheels locked and side rails x2. Non slip socks on and room door open. Call light and bedside table within reach, will continue to monitor.

## 2020-07-19 NOTE — PROGRESS NOTES
Hospitalist Progress Note      PCP: Ld Bernstein MD    Date of Admission: 7/14/2020    Chief Complaint: skin peeling    Subjective: no new c/o. Medications:  Reviewed    Infusion Medications     Scheduled Medications    sulfamethoxazole-trimethoprim  1 tablet Oral Once per day on Mon Wed Fri    mineral oil-hydrophilic petrolatum   Topical BID    dilTIAZem  120 mg Oral Daily    tiotropium  2 puff Inhalation Daily    traZODone  100 mg Oral Nightly    methylPREDNISolone  40 mg Intravenous Q8H    sodium chloride flush  10 mL Intravenous 2 times per day    enoxaparin  40 mg Subcutaneous Daily    megestrol  200 mg Oral BID     PRN Meds: diphenhydrAMINE, albuterol, hydrOXYzine, sodium chloride flush, acetaminophen **OR** acetaminophen, polyethylene glycol, promethazine **OR** ondansetron, oxyCODONE-acetaminophen **OR** oxyCODONE-acetaminophen      Intake/Output Summary (Last 24 hours) at 7/19/2020 1142  Last data filed at 7/19/2020 0847  Gross per 24 hour   Intake 1920 ml   Output 1275 ml   Net 645 ml       Physical Exam Performed:    /74   Pulse 95   Temp 98.1 °F (36.7 °C) (Oral)   Resp 18   Ht 6' (1.829 m)   Wt 146 lb 14.4 oz (66.6 kg)   SpO2 95%   BMI 19.92 kg/m²     General appearance: No apparent distress, appears stated age and cooperative. HEENT: Pupils equal, round, and reactive to light. Conjunctivae/corneas clear. Neck: Supple, with full range of motion. No jugular venous distention. Trachea midline. Respiratory:  Normal respiratory effort. Clear to auscultation, bilaterally without Rales/Wheezes/Rhonchi. Cardiovascular: Regular rate and rhythm with normal S1/S2 without murmurs, rubs or gallops. Abdomen: Soft, non-tender, non-distended with normal bowel sounds. Musculoskeletal: No clubbing, cyanosis or edema bilaterally. Full range of motion without deformity. Skin: Skin color, texture, turgor normal.  No rashes or lesions.   Neurologic:  Neurovascularly intact without any focal sensory/motor deficits. Cranial nerves: II-XII intact, grossly non-focal.  Psychiatric: Alert and oriented, thought content appropriate, normal insight  Capillary Refill: Brisk,< 3 seconds   Peripheral Pulses: +2 palpable, equal bilaterally       Labs:   Recent Labs     07/17/20  0400 07/18/20  0610   WBC 10.9 10.6   HGB 9.4* 8.9*   HCT 28.3* 26.7*    239     Recent Labs     07/17/20  0400 07/18/20  0610    139   K 4.4 4.2   * 110   CO2 22 24   BUN 16 17   CREATININE 0.8 0.8   CALCIUM 6.2* 6.2*   PHOS 1.5* 1.3*     No results for input(s): AST, ALT, BILIDIR, BILITOT, ALKPHOS in the last 72 hours. No results for input(s): INR in the last 72 hours. No results for input(s): Valentino Bun in the last 72 hours. Urinalysis:      Lab Results   Component Value Date    NITRU Negative 07/17/2020    WBCUA 0-2 06/24/2013    BACTERIA Few 05/08/2013    RBCUA 10-20 06/24/2013    BLOODU Negative 07/17/2020    SPECGRAV 1.025 07/17/2020    GLUCOSEU Negative 07/17/2020    GLUCOSEU NEGATIVE 11/30/2011       Consults:    IP CONSULT TO HOSPITALIST  IP CONSULT TO ONCOLOGY      Assessment/Plan:    Active Hospital Problems    Diagnosis    Dermatitis [L30.9]    PAF [facility]paroxysmal atrial fibrillation) ([facility] [I48.0]    COPD, severe ([facility] [J44.9]       Dermatitis - Exfoliative, likely 2nd to NHL Mantle Cell with drug rash   On iv solumedrol per Hematology recommendation   Cont Bactrim DS MWF per protocol and PCP prevention   Given dose of Vancomycin in ED - not continued w/out evidence of infection     Lymphoma - NHL Mantle Cell, currently in drug tx trial - currently held. H  briana/Onc consulted and appreciated.      Anemia - etiology clinically unable to determine, w/out evidence of active bleeding/hemolysis. Stable and asymptomatic w/out indication for transfusion. Follow serial labs. Reviewed and documented as above.     ARF - w/out elevated BUN/Cr ratio but w/ hx c/w pre-renal azotemia.  Given IVF hydration and follow serial labs - improving and stable. Reviewed and documented as above.     HypoNatremia - etiology clinically unable to determine but likely hypovolemic. Resolved.      COPD - w/out chronic respiratory failure on no baseline home O2. Controlled on home medication regimen - continued.         DVT Prophylaxis: LMWH  Diet: DIET GENERAL;  Code Status: DNR-CCA      PT/OT Eval Status: not yet ordered.      Dispo - d/c once ok from Hematology stand point.        Poornima Vera MD

## 2020-07-19 NOTE — PROGRESS NOTES
Assessment complete and charted. aquafor and dressings changed per orders. Pt denies needs at this time. Call light within reach, will continue to monitor.

## 2020-07-20 NOTE — PROGRESS NOTES
D/c to home instructions given to pt. Expressed understanding; denied questions. All pt belongings given to pt. Pt aware to  prescriptions at local pharmacy. Pt transported in stable condition via wheelchair to personal vehicle by staff.    Electronically signed by Noemi Randall RN on 7/20/2020 at 3:37 PM

## 2020-07-20 NOTE — PROGRESS NOTES
Assessment complete and charted. No change in pt condition from last night. Pt denies needs at this time. Call light within reach, will continue to monitor.

## 2020-07-20 NOTE — PLAN OF CARE
Problem: Falls - Risk of:  Goal: Will remain free from falls  Description: Will remain free from falls  Outcome: Ongoing  Goal: Absence of physical injury  Description: Absence of physical injury  Outcome: Ongoing     Problem: Falls - Risk of: Intervention: Assess risk factors for falls  Note: Pt is high fall risk  Intervention: Postfall assessment  Note: No falls sustained thus far this shift. Intervention: Manage a safe environment  Note: Call light within reach. Bed side table within reach. Wheels locked. Bed in lowest position. Refused bed alarm; independent with ADLs. Pt instructed to call out for assistance. Pt expressed understanding & calls out appropriately. Intervention: Toileting assistance  Note: Independent with toileting needs.

## 2020-07-20 NOTE — DISCHARGE INSTR - COC
Continuity of Care Form    Patient Name: Carlton Peña   :  1945  MRN:  [id number]    Admit date:  2020  Discharge date:  ***    Code Status Order: DNR-CCA   Advance Directives:   [facility] Documentation     Date/Time Healthcare Directive Type of Healthcare Directive Copy in [address] Agent's Name Healthcare Agent's Phone Number    07/15/20 0603  Yes, patient has an advance directive for healthcare treatment  Durable power of  for health care;Living will  No, copy requested from family  --  --  --          Admitting Physician:  Trent Martinez MD  PCP: Raffi Navarro MD    Discharging Nurse: [facility] Unit/Room#: 8953/0241-93  Discharging Unit Phone Number: ***    Emergency Contact:   Extended Emergency Contact Information  Primary Emergency Contact: Salina Baldwin  Address: [address] [place] [address] Phone: [phone]  Mobile Phone: [phone]  Relation: Spouse  Secondary Emergency Contact: Dany [address] [place] [address] Phone: [phone]  Mobile Phone: [phone]  Relation: Child    Past Surgical History:  Past Surgical History:   Procedure Laterality Date    CHEST TUBE INSERTION      x3    COLONOSCOPY      polyps q 5 yrs    COLONOSCOPY  5/15/13    FEMUR SURGERY      r/t fracture with retained hardware    FRACTURE SURGERY      broken arm    FRACTURE SURGERY      femur    LYMPH NODE BIOPSY  12    right inguinal lymph node excision and biopsy    MIDDLE EAR SURGERY      OTHER SURGICAL HISTORY  12    stem cell transplant    SKIN BIOPSY      SKIN CANCER DESTRUCTION      STOMACH SURGERY      at age 7 weeks    THORACOSCOPY Left 2018    video assisted thoracoscopy,left upper lobe wedge resection;mechanical and chemical pleurodesis    TUNNELED VENOUS PORT PLACEMENT         Immunization History:   Immunization Mental Status:  {IP PT MENTAL STATUS:35614}    IV Access:  { REVA IV ACCESS:871531776}    Nursing Mobility/ADLs:  Walking   {CHP DME FNYZ:083906566}  Transfer  {CHP DME MQEZ:423110010}  Bathing  {CHP DME ECXS:319482248}  Dressing  {CHP DME YIPV:617187768}  Toileting  {CHP DME EUQX:195909177}  Feeding  {CHP DME FLDU:032902256}  Med Admin  {CHP DME TZSJ:651447477}  Med Delivery   { REVA MED Delivery:214359596}    Wound Care Documentation and Therapy:  Wound 07/15/20 Radial Left open pink wound bed, black skin from deroofed bulla present (Active)   Wound Image   07/15/20 1537   Wound Skin Tear 07/20/20 0915   Dressing Status Clean;Dry; Intact 07/20/20 0915   Dressing Changed Changed/New 07/20/20 0915   Dressing/Treatment Other (comment) 07/18/20 1435   Wound Cleansed Rinsed/Irrigated with saline 07/18/20 1435   Dressing Change Due 07/21/20 07/20/20 0915   Wound Length (cm) 6 cm 07/15/20 1537   Wound Width (cm) 4 cm 07/15/20 1537   Wound Depth (cm) 0.1 cm 07/15/20 1537   Wound Surface Area (cm^2) 24 cm^2 07/15/20 1537   Wound Volume (cm^3) 2.4 cm^3 07/15/20 1537   Distance Tunneling (cm) 0 cm 07/15/20 1537   Tunneling Position ___ O'Clock 0 07/15/20 1537   Undermining Starts ___ O'Clock 0 07/15/20 1537   Undermining Ends___ O'Clock 0 07/15/20 1537   Undermining Maxium Distance (cm) 0 07/15/20 1537   Wound Assessment Pale;Pink 07/15/20 1537   Drainage Amount None 07/16/20 0730   Drainage Description Serous 07/15/20 1537   Odor None 07/16/20 0730   Margins Unattached edges 07/15/20 1537   [place]%Wound Bed 100 07/15/20 1537   Culture Taken No 07/15/20 1537   Number of days: 4       Wound 07/15/20 Elbow Left;Proximal pink wound bed, black skin from deroofed bulla (Active)   Wound Image   07/15/20 1537   Wound Skin Tear 07/20/20 0915   Dressing Status Clean;Dry; Intact 07/20/20 0915   Dressing Changed Changed/New 07/20/20 0915   Dressing/Treatment Open to air 07/18/20 1435   Wound Cleansed Rinsed/Irrigated with saline 07/18/20 1435   Dressing Change Due 07/21/20 07/20/20 0915   Wound Length (cm) 2 cm 07/15/20 1537   Wound Width (cm) 2 cm 07/15/20 1537   Wound Depth (cm) 0.1 cm 07/15/20 1537   Wound Surface Area (cm^2) 4 cm^2 07/15/20 1537   Wound Volume (cm^3) 0.4 cm^3 07/15/20 1537   Tunneling Position ___ O'Clock 0 07/15/20 1537   Undermining Starts ___ O'Clock 0 07/15/20 1537   Undermining Ends___ O'Clock 0 07/15/20 1537   Undermining Maxium Distance (cm) 0 07/15/20 1537   Wound Assessment Pale;Pink 07/15/20 1537   Drainage Amount None 07/16/20 0730   Drainage Description Serous 07/15/20 1537   Odor None 07/16/20 0730   Margins Unattached edges 07/15/20 1537   Lorena-wound Assessment Dark edges;Edema; Red 07/15/20 1537   Non-staged Wound Description Partial thickness 07/15/20 1537   [place]%Wound Bed 100 07/15/20 1537   Culture Taken No 07/15/20 1537   Number of days: 4        Elimination:  Continence:   · Bowel: {YES / YA:69418}  · Bladder: {YES / ND:19090}  Urinary Catheter: {Urinary Catheter:896409550}   Colostomy/Ileostomy/Ileal Conduit: {YES / SS:79778}       Date of Last BM: ***    Intake/Output Summary (Last 24 hours) at 7/20/2020 1149  Last data filed at 7/20/2020 0918  Gross per 24 hour   Intake 1030 ml   Output 500 ml   Net 530 ml     I/O last 3 completed shifts:   In: 1500 [P.O.:1500]  Out: 7320 [Urine:1275]    Safety Concerns:     [address] Safety Concerns:024742956}    Impairments/Disabilities:      [address] Impairments/Disabilities:081393088}    Nutrition Therapy:  Current Nutrition Therapy:   [address] Diet List:317954070}    Routes of Feeding: {CHP DME Other Feedings:800373653}  Liquids: {Slp liquid thickness:17303}  Daily Fluid Restriction: {CHP DME Yes amt example:441156212}  Last Modified Barium Swallow with Video (Video Swallowing Test): {Done Not Done BLQV:143086893}    Treatments at the Time of Hospital Discharge:   Respiratory Treatments: ***  Oxygen Therapy:  {Therapy; copd oxygen:91600}  Ventilator:    { CC Vent NKBK:566903252}    Rehab Therapies: {THERAPEUTIC INTERVENTION:5238418120}  Weight Bearing Status/Restrictions: { CC Weight Bearin}  Other Medical Equipment (for information only, NOT a DME order):  {EQUIPMENT:306082792}  Other Treatments: ***    Patient's personal belongings (please select all that are sent with patient):  {CHP DME Belongings:692057205}    RN SIGNATURE:  {Esignature:375825169}    CASE MANAGEMENT/SOCIAL WORK SECTION    Inpatient Status Date: ***    Readmission Risk Assessment Score:  Readmission Risk              Risk of Unplanned Readmission:        19           Discharging to Facility/ Agency   · Name:   · Address:  · Phone:  · Fax:    Dialysis Facility (if applicable)   · Name:  · Address:  · Dialysis Schedule:  · Phone:  · Fax:    / signature: {Esignature:214651909}    PHYSICIAN SECTION    Prognosis: {Prognosis:3942502344}    Condition at Discharge: [address] [place] [address] Patient Condition:200929520}    Rehab Potential (if transferring to Rehab): {Prognosis:9832674352}    Recommended Labs or Other Treatments After Discharge: ***    Physician Certification: I certify the above information and transfer of Colette Padgett  is necessary for the continuing treatment of the diagnosis listed and that he requires {Admit to Appropriate Level of Care:80060} for {GREATER/LESS:264747956} 30 days.      Update Admission H&P: {CHP DME Changes in XDIHO:492621533}    PHYSICIAN SIGNATURE:  {Esignature:377998499}

## 2020-07-20 NOTE — ONCOLOGY
ONCOLOGY HEMATOLOGY CARE PROGRESS NOTE      SUBJECTIVE:     Feels great and ready to leave today. No longer with scaly rash. ROS:   The remaining 10 point review of symptoms is unremarkable. OBJECTIVE        Physical    VITALS:  /75   Pulse 84   Temp 97.8 °F (36.6 °C) (Oral)   Resp 12   Ht 6' (1.829 m)   Wt 146 lb 14.4 oz (66.6 kg)   SpO2 98%   BMI 19.92 kg/m²   TEMPERATURE:  Current - Temp: 97.8 °F (36.6 °C); Max - Temp  Av.2 °F (36.8 °C)  Min: 97.8 °F (36.6 °C)  Max: 98.7 °F (37.1 °C)  PULSE OXIMETRY RANGE: SpO2  Av.7 %  Min: 95 %  Max: 98 %  24HR INTAKE/OUTPUT:      Intake/Output Summary (Last 24 hours) at 2020 1000  Last data filed at 2020 9553  Gross per 24 hour   Intake 1030 ml   Output 500 ml   Net 530 ml       CONSTITUTIONAL:  awake, alert, cooperative, no apparent distress, HEENT oral pharynx , no scleral icterus  HEMATOLOGIC/LYMPHATICS:  no cervical lymphadenopathy, no supraclavicular lymphadenopathy, no axillary lymphadenopathy and no inguinal lymphadenopathy  LUNGS:  No increased work of breathing, good air exchange, clear to auscultation bilaterally, no crackles or wheezing  CARDIOVASCULAR:  , regular rate and rhythm, normal S1 and S2, no S3 or S4, and no murmur noted  ABDOMEN:  No scars, normal bowel sounds, soft, non-distended, non-tender, no masses palpated, no hepatosplenomegally  MUSCULOSKELETAL:  There is no redness, warmth, or swelling of the joints. EXTREMETIES: No clubbing cynosis or edema  NEUROLOGIC:  Awake, alert, oriented to name, place and time. Cranial nerves II-XII are grossly intact. Motor is 5 out of 5 bilaterally.    SKIN:  no bruising or bleeding, scaling is gone, skin is moist and pink       Data      Recent Labs     20  0610   WBC 10.6   HGB 8.9*   HCT 26.7*      MCV 90.4        Recent Labs     20  0610      K 4.2      CO2 24   PHOS 1.3*   BUN 17   CREATININE 0.8     No results for input(s): AST, ALT, ALB, BILIDIR, BILITOT, ALKPHOS in the last 72 hours.     Magnesium:    Lab Results   Component Value Date    MG 1.90 01/10/2018    MG 2.20 01/09/2018    MG 1.50 01/09/2018         Problem List  Patient Active Problem List   Diagnosis    Tobacco abuse disorder    Primary spontaneous pneumothorax    [facility], [facility]    Autologous bone marrow transplantation    Mantle cell lymphoma of lymph nodes of multiple sites ([facility]    Encounter for long-term (current) use of high-risk medication    Chest pain    Spontaneous pneumothorax    PAF (paroxysmal atrial fibrillation) (HCC)    Typical atrial flutter (HCC)    Cigarette smoker    Dermatitis       ASSESSMENT AND PLAN    NHL Mantle Cell with drug rash   - Study drug permanently discontinued for grade 3 rash  - Cont supportive care with foamy cleanser then emollient (Aquaphor) and solumedrol 40 every 8 hours - dc on Pred 40 daily - discussed with Dr. Huizar Poster.   - Can stop Bactrim DS permanently   - can continue prednisone taper when ready for discharge- Pred 40 daily with outpatient taper based on rash   - Needs PPI , sugars have been stable      Anemia  - transfuse to keep over 7- now 8.9     Diarrhea  - This is improving.     Pain  -improved, no changes        Janice Gonzalez, CNP

## 2020-07-22 NOTE — CARE COORDINATION
Michael 45 Transitions Initial Follow Up Call    Call within 2 business days of discharge: Yes    Patient: Eda Paulson Patient : 1945   MRN: [id number]  Reason for Admission: Exfoliative Dermatitis   Discharge Date: 20 RARS: Readmission Risk Score: 19      Last Discharge [facility]       Complaint Diagnosis Description Type Department Provider    20 Other Exfoliative dermatitis ED to Hosp-Admission (Discharged) (ADMITTED) Muna Gtz C3 Matteo Underwood MD; 9 [facility]. .. Spoke with: [facility]: [facility]     Non-face-to-face services provided:  Obtained and reviewed discharge summary and/or continuity of care documents     Spoke with patient who reported he is doing fine just still tired and worn out. Patient stated he knows that can be expected once discharged from the hospital. Patient reported his dermatitis is improving and he only has two small areas on his feet still. Reviewed all medications with patient who reported he is taking all as prescribed. Patient has apt with Dr. Ariana Simmons on Monday. Denies any acute needs at present time. Agreeable to f/u calls. Educated on the use of urgent care or physicians 24 hr access line if assistance is needed after hours.        Care Transitions 24 Hour Call    Do you have any ongoing symptoms?:  No  Do you have a copy of your discharge instructions?:  Yes  Do you have all of your prescriptions and are they filled?:  Yes  Have you been contacted by a [address]?:  No  Have you scheduled your follow up appointment?:  Yes  How are you going to get to your appointment?:  Car - family or friend to transport  Were you discharged with any Home Care or Post Acute Services:  No  Patient Home Equipment:  Nebulizer  Do you have support at home?:  Partner/Spouse/SO  Do you feel like you have everything you need to keep you well at home?:  Yes  Are you an active caregiver in your home?:  No  Care Transitions Interventions Other Services:  Declined (Comment: Marah Javier)          Follow Up  Future Appointments   Date Time Provider Gricelda Hurtado   9/8/2020 11:00 AM [facility]imee [facility]resp[facility]       Jsoe C Green RN

## 2020-07-25 NOTE — DISCHARGE SUMMARY
Hospital Medicine Discharge Summary    Patient: Tre Abreu     Gender: male  : 1945   Age: 77 y.o. MRN: [id number]    Admitting Physician: Mela Jones MD  Discharge Physician: ANAID Mehta CNP     Code Status: FC    Admit Date: 2020   Discharge Date: 2020      Disposition:  Home    Discharge Diagnoses: Active Hospital Problems    Diagnosis Date Noted    Dermatitis [L30.9] 2020    PAF (paroxysmal atrial fibrillation) ([facility] [address] [I48.0] 2018    COPD, severe ([facility] [address] [J44.9] 2011       Follow-up appointments:  one week -[facility]  Future Appointments   Date Time Provider Gricelda Genesis   2020 11:00 AM Holly Robison MD [facility]       Outpatient to do list: continue skin care-aquaphor     Condition at Discharge:  Stable    Hospital Course:     NHL Mantle Cell with drug rash   - Study drug permanently discontinued for grade 3 rash  - Cont supportive care with foamy cleanser then emollient (Aquaphor) and solumedrol 40 every 8 hours - dc on Pred 40 daily - discussed with Dr. Isac Alonso.   - DC Bactrim DS permanently   - can continue prednisone taper at discharge- Pred 40 daily with outpatient taper based on rash. He will f/u with Dr Kimberly Virgen in the office.   - Protonix      Anemia  -stable     Diarrhea  - resolved    Pain  -improved, no changes     Discharge Medications:   Discharge Medication List as of 2020  3:04 PM      START taking these medications    Details   predniSONE (DELTASONE) 20 MG tablet Take 2 tablets by mouth daily for 10 days, Disp-20 tablet,R-0Normal      mineral oil-hydrophilic petrolatum (AQUAPHOR) ointment Apply topically as needed. , Disp-396 g,R-1, Normal      pantoprazole (PROTONIX) 40 MG tablet Take 1 tablet by mouth every morning (before breakfast), Disp-30 tablet,R-3Normal           Discharge Medication List as of 2020  3:04 PM        Discharge Medication List as of 2020  3:04 PM      CONTINUE these medications which have NOT CHANGED    Details   INCRUSE ELLIPTA 62.5 MCG/INH AEPB INHALE 1 PUFF INTO THE LUNGS DAILY, Disp-1 each,R-5Normal      traZODone (DESYREL) 50 MG tablet TAKE 1 TO 2 TABLETS BY MOUTH EVERY NIGHT AT BEDTIME FOR SLEEP, Disp-60 tablet, R-5Normal      diltiazem (CARTIA XT) 120 MG extended release capsule Take 1 capsule by mouth daily, Disp-90 capsule, R-3**Patient requests 90 days supply**Normal      albuterol sulfate HFA (PROAIR HFA) 108 (90 Base) MCG/ACT inhaler Inhale 2 puffs into the lungs every 6 hours as needed for Wheezing or Shortness of Breath, Disp-1 Inhaler, R-5Normal      megestrol (MEGACE) 40 MG tablet Take 200 mg by mouth 2 times dailyHistorical Med      hydrOXYzine (ATARAX) 25 MG tablet Take 1 tablet by mouth 2 times daily as needed for Anxiety, Disp-30 tablet, R-1Normal           Discharge Medication List as of 7/20/2020  3:04 PM      STOP taking these medications       Sulfamethoxazole-Trimethoprim (BACTRIM PO) Comments:   Reason for Stopping:         NONFORMULARY Comments:   Reason for Stopping:         Multiple Vitamins-Iron (MULTIVITAMIN/IRON PO) Comments:   Reason for Stopping:               Discharge ROS:  Pertinent positives as noted dc summary. All other systems reviewed and negative. Discharge Exam:    /73   Pulse 92   Temp 98.4 °F (36.9 °C) (Oral)   Resp 18   Ht 6' (1.829 m)   Wt 146 lb 14.4 oz (66.6 kg)   SpO2 94%   BMI 19.92 kg/m²   General appearance:  NAD  HEENT:   Normal cephalic, atraumatic, moist mucous membranes, no oropharyngeal erythema or exudate  Neck: Supple, trachea midline, no anterior cervical or SC LAD  Heart[de-identified] Normal s1/s2, RRR, no murmurs, gallops, or rubs. no leg edema  Lungs:  clear bilaterally, no wheeze, no rales or rhonchi, no use of accessory musclesNormal respiratory effort. Clear to auscultation, bilaterally without Rales/Wheezes/Rhonchi.   Abdomen: Soft, non-tender, non-distended, bowel sounds present, no masses  Musculoskeletal:  No clubbing, no cyanosis, no edema  Skin: diffuse erythroderma. No peeling. No blisters  Neurologic:  Neurovascularly intact without any focal sensory/motor deficits. Cranial nerves: II-XII intact, grossly non-focal.  Psychiatric:  A & O x3  Neuro: Grossly intact, moves all four extremities     Labs: For convenience and continuity at follow-up the following most recent labs are provided:    Lab Results   Component Value Date    WBC 10.6 07/18/2020    HGB 8.9 07/18/2020    HCT 26.7 07/18/2020    MCV 90.4 07/18/2020     07/18/2020     07/18/2020    K 4.2 07/18/2020    K 4.5 07/14/2020     07/18/2020    CO2 24 07/18/2020    BUN 17 07/18/2020    CREATININE 0.8 07/18/2020    CALCIUM 6.2 07/18/2020    PHOS 1.3 07/18/2020    BNP 20.8 12/06/2012    ALKPHOS 80 07/14/2020    ALT 22 07/14/2020    AST 18 07/14/2020    BILITOT 0.3 07/14/2020    BILIDIR 0.0 06/24/2013    LABALBU 2.5 07/18/2020    LDLCALC 103 06/17/2017    TRIG 61 06/17/2017     Lab Results   Component Value Date    INR 1.04 06/16/2017    INR 0.97 09/26/2014    INR 1.35 (H) 06/24/2013       Radiology:  Xr Chest Portable    Result Date: 7/14/2020  EXAMINATION: ONE XRAY VIEW OF THE CHEST 7/14/2020 2:53 pm COMPARISON: CT chest, 06/08/2020 HISTORY: ORDERING SYSTEM PROVIDED HISTORY: sirs TECHNOLOGIST PROVIDED HISTORY: Reason for exam:->sirs Reason for Exam: lymphoma, exfoliating dermatitis Acuity: Acute Type of Exam: Initial FINDINGS: A left infusion port catheter terminates over the superior cavoatrial junction. The cardiac silhouette is normal.  Aortic arch calcification and mild tortuosity of the thoracic aorta are noted. Hilar contours are stable. Lungs are hyperinflated. No consolidation, pleural effusion or pneumothorax is evident. No acute cardiopulmonary disease. Changes consistent with COPD.        The patient was seen and examined on day of discharge and this discharge summary is in conjunction with any daily progress note from day of discharge. Time Spent on discharge is 45 minutes  in the examination, evaluation, counseling and review of medications and discharge plan. Note that more than 30 minutes was spent in preparing discharge papers, discussing discharge with patient, medication review, etc.       Signed:    ANAID Mosquera CNP   7/25/2020      Thank you Bala Mcguire MD for the opportunity to be involved in this patient's care. If you have any questions or concerns please feel free to contact me.

## 2020-07-29 NOTE — CARE COORDINATION
[facility] Transitions Follow Up Call    2020    Patient: Jose Cordova  Patient : 1945   MRN: [id number]  Reason for Admission: Exfoliative Dermatitis  Discharge Date: 20 RARS: Readmission Risk Score: 19         Attempted to reach patient via phone for care transition. VM left stating purpose of call along with my contact information requesting a return call.             Follow Up  Future Appointments   Date Time Provider Gricelda Hurtado   2020 11:00 AM Jessa Arriola MD [facility]       José Miguel Chen RN

## 2020-08-04 PROBLEM — K66.8 PNEUMOPERITONEUM: Status: ACTIVE | Noted: 2020-01-01

## 2020-08-04 NOTE — PROGRESS NOTES
RN ruba BLANCO    Did you want to place any admission orders? I currently only have Onc, Gen sx, tele monitoring and fall precaution orders. Also, there are orders for Dilaudid and Fentanyl q30 min, would you like to modify those orders? Thank you! Will watch for orders.

## 2020-08-04 NOTE — ED NOTES
1326 - called general surgery  Re: repeat CT completed  1342 - Dr Constance Fontenot called back to speak with Dr Daphne Mullins  08/04/20 134

## 2020-08-04 NOTE — ED NOTES
1351 - PS to Dr Miky Sharma at Hem/Onc  Re:  pneumoperitoneum  1353 - Dr Miky Sharma called back to speak with Dr Tinoco Smoker  08/04/20 [phone]

## 2020-08-04 NOTE — ED NOTES
1109 - called general surgery   Re: LLQ pain, large pneumoperitoneum on CT  1122 - Dr Constance Fontenot called back to speak with Dr Jimbo Colón  08/04/20 1127

## 2020-08-04 NOTE — ED NOTES
1354 - PS to hospitalists  Re: pneumopertitoneum  1357 - Dr Missy Plunkett called back to speak with Dr Walker Route  08/04/20 2489

## 2020-08-04 NOTE — ED NOTES
Pt has a power port place in 2012. Oct 18,2020 card was copied and placed in chart with CT, xray verification on Nov 26,2018 and operative note on 12/18/20.      Rose Wilson RN  08/04/20 8084

## 2020-08-04 NOTE — ED TRIAGE NOTES
Patient identified as a positive fall risk on the ED triage fall screening. Patient placed in fall precautions which includes:  yellow fall risk bracelet on wrist, personal sneakers on feet (feet swollen, yellow sock tied to handrails), \"Be Safe\" sign placed on patient's door, and bed alarm placed under patient/alarm turned on. Patient instructed on importance of not getting out of bed or ambulating without assistance for safety.

## 2020-08-04 NOTE — PROGRESS NOTES
Patient admitted to room 357 from ED. Patient oriented to room, call light, bed rails, phone, lights and bathroom. Patient instructed about the schedule of the day including: vital sign frequency, lab draws, possible tests, frequency of MD and staff rounds, including RN/MD rounding together at bedside, daily weights, and I &O's. Patient instructed about prescribed diet and television. Bed alarm in place, patient aware of placement and reason. Telemetry box in place, patient aware of placement and reason. Bed locked, in lowest position, side rails up 2/4, call light within reach. Will continue to monitor.

## 2020-08-04 NOTE — PROGRESS NOTES
RN paged MD    Can I get home medication orders for the following that weren't added on admission-  Megace 200mg BID  Pantoprazole 20mg QD   Will watch for orders. Please and thank you!

## 2020-08-04 NOTE — ED PROVIDER NOTES
[address] [facility]  ED  EMERGENCY DEPARTMENT ENCOUNTER      Pt Name: Vida Rodriguez  MRN: [id number]  [place] 1945  Date of evaluation: 8/4/2020  Provider: Amy Yoo MD    CHIEF COMPLAINT       Chief Complaint   Patient presents with    Abdominal Pain     patient being treated for a type of lymphoma, had CT last friday for CT adbomen. Supposed to start chemo tomorrow. Yesterday developed severe pain in left lower side. Denies vomiting but had loose BM this AM. Has not takena ny medications for the pain. HISTORY OF PRESENT ILLNESS   (Location/Symptom, Timing/Onset, Context/Setting, Quality, Duration, Modifying Factors, Severity)  Note limiting factors. Vida Rodriguez is a 77 y.o. male who presents to the emergency department     Patient is a 27-year-old male with a past medical history of non-Hodgkin's lymphoma, COPD who presents with acute onset of left lower quadrant abdominal pain. Patient reports that pain started last night at approximately 5:00 PM.  He states that initially it did not seem too bad but it felt like a stabbing pain. He was able to sleep last night but when he woke up this morning the pain was back and it was worse. He reports that it still a stabbing pain that does not radiate that he rates as a 9 out of 10 on the pain scale. Reports feeling short of breath because it hurts to take a deep breath in but denies any fevers, chills, vomiting, dysuria or hematuria. Does report an episode of diarrhea today which was nonbloody. Patient has never had pain like this before. The history is provided by the patient. Nursing Notes were reviewed. REVIEW OF SYSTEMS    (2-9 systems for level 4, 10 or more for level 5)     Review of Systems   Constitutional: Negative for chills and fever. HENT: Negative for congestion and sore throat. Eyes: Negative for visual disturbance. Respiratory: Negative for cough and shortness of breath.     Cardiovascular: Negative for chest pain. Gastrointestinal: Positive for abdominal pain and diarrhea. Negative for nausea and vomiting. Genitourinary: Negative for dysuria and hematuria. Musculoskeletal: Negative for back pain and neck pain. Skin: Negative for pallor and rash. Neurological: Negative for light-headedness and headaches. All other systems reviewed and are negative. Except as noted above the remainder of the review of systems was reviewed and negative.        PAST MEDICAL HISTORY     Past Medical History:   Diagnosis Date    Blood transfusion     Cancer ([address]     basal and squamous cell skin ca    COPD, mild ([address]     Dermatitis 7/14/2020    Disease of blood and blood forming organ     History of blood transfusion     Medical history reviewed with no changes     Non Hodgkin's lymphoma [facility] [address] 12/6/12    chemo started 12/26/12x6, then stem cell tx    Pneumonia     lymphoma, pneumo history    Pneumothorax Dec. 2012    Spontaneous pneumothorax     x 3 last 11/3/2011         SURGICAL HISTORY       Past Surgical History:   Procedure Laterality Date    CHEST TUBE INSERTION      x3    COLONOSCOPY  4/9    polyps q 5 yrs    COLONOSCOPY  5/15/13    FEMUR SURGERY      r/t fracture with retained hardware    FRACTURE SURGERY  1960    broken arm    FRACTURE SURGERY  2003    femur    LYMPH NODE BIOPSY  12/11/12    right inguinal lymph node excision and biopsy    MIDDLE EAR SURGERY      OTHER SURGICAL HISTORY  6/7/12    stem cell transplant    SKIN BIOPSY      SKIN CANCER DESTRUCTION      STOMACH SURGERY      at age 7 weeks    THORACOSCOPY Left 01/08/2018    video assisted thoracoscopy,left upper lobe wedge resection;mechanical and chemical pleurodesis    TUNNELED VENOUS PORT PLACEMENT           CURRENT MEDICATIONS       Previous Medications    ALBUTEROL SULFATE HFA (PROAIR HFA) 108 (90 BASE) MCG/ACT INHALER    Inhale 2 puffs into the lungs every 6 hours as needed for Wheezing or Shortness of Breath    DILTIAZEM (CARTIA XT) 120 MG EXTENDED RELEASE CAPSULE    Take 1 capsule by mouth daily    HYDROXYZINE (ATARAX) 25 MG TABLET    Take 1 tablet by mouth 2 times daily as needed for Anxiety    INCRUSE ELLIPTA 62.5 MCG/INH AEPB    INHALE 1 PUFF INTO THE LUNGS DAILY    MEGESTROL (MEGACE) 40 MG TABLET    Take 200 mg by mouth 2 times daily    TRAZODONE (DESYREL) 50 MG TABLET    TAKE 1 TO 2 TABLETS BY MOUTH EVERY NIGHT AT BEDTIME FOR SLEEP       ALLERGIES     Latex and Adhesive tape    FAMILY HISTORY       Family History   Problem Relation Age of Onset    High Blood Pressure Father     Stroke Father 68    Alcohol Abuse Sister     Liver Disease Sister          age 55    Cancer Brother     COPD Brother     Cancer Brother         Brain / Lung          SOCIAL HISTORY       Social History     Socioeconomic History    Marital status:      Spouse name: Belem Randall Number of children: Not on file    Years of education: 15    Highest education level: Not on file   Occupational History     Comment: fire dept retired   Social Needs    Financial resource strain: Not on file    Food insecurity     Worry: Not on file     Inability: Not on file   Mayomi needs     Medical: Not on file     Non-medical: Not on file   Tobacco Use    Smoking status: Current Every Day Smoker     Packs/day: 0.50     Years: 50.00     Pack years: 25.00     Types: Cigarettes    Smokeless tobacco: Never Used    Tobacco comment: 1-2 cigs daily - 3/5/20   Substance and Sexual Activity    Alcohol use:  Yes     Alcohol/week: 0.0 standard drinks     Comment: 8 pack beer weekly    Drug use: No    Sexual activity: Never     Partners: Female   Lifestyle    Physical activity     Days per week: Not on file     Minutes per session: Not on file    Stress: Not on file   Relationships    Social connections     Talks on phone: Not on file     Gets together: Not on file     Attends Taoist service: Not on file     Active member of club or organization: Not on file     Attends meetings of clubs or organizations: Not on file     Relationship status: Not on file    Intimate partner violence     Fear of current or ex partner: Not on file     Emotionally abused: Not on file     Physically abused: Not on file     Forced sexual activity: Not on file   Other Topics Concern    Not on file   Social History Narrative    ** Merged History Encounter **            SCREENINGS    Jessi Coma Scale  Eye Opening: Spontaneous  Best Verbal Response: Oriented  Best Motor Response: Obeys commands  Left Hand Coma Scale Score: 15          PHYSICAL EXAM    (up to 7 for level 4, 8 or more for level 5)     ED Triage Vitals   BP Temp Temp src Pulse Resp SpO2 Height Weight   -- -- -- -- -- -- -- --       Physical Exam  Vitals signs and nursing note reviewed. Constitutional:       General: He is not in acute distress. Appearance: Normal appearance. HENT:      Head: Normocephalic and atraumatic. Nose: Nose normal.      Mouth/Throat:      Mouth: Mucous membranes are moist.   Eyes:      Conjunctiva/sclera: Conjunctivae normal.   Neck:      Musculoskeletal: Normal range of motion and neck supple. Cardiovascular:      Rate and Rhythm: Regular rhythm. Tachycardia present. Pulses: Normal pulses. Heart sounds: Normal heart sounds. Pulmonary:      Effort: Pulmonary effort is normal. No respiratory distress. Breath sounds: Normal breath sounds. Abdominal:      General: There is no distension. Palpations: Abdomen is soft. Tenderness: There is abdominal tenderness in the left lower quadrant. There is no right CVA tenderness or left CVA tenderness. Musculoskeletal: Normal range of motion. General: No swelling or deformity. Skin:     General: Skin is warm and dry. Neurological:      General: No focal deficit present. Mental Status: He is alert and oriented to person, place, and time. DIAGNOSTIC RESULTS     EKG:  All EKG's are interpreted by the Emergency Department Physician who either signs or Co-signs this chart in the absence of a cardiologist.      RADIOLOGY:     Interpretation per the Radiologist below, if available at the time of this note:    CT ABDOMEN PELVIS W IV CONTRAST Additional Contrast? None   Final Result   Addendum 1 of 1   ADDENDUM:   These results were communicated by telephone to Dr. Herbert Dahl at 11:06 a.m.    on   08/04/2020         Final      CT ABDOMEN PELVIS WO CONTRAST Additional Contrast? Oral    (Results Pending)         LABS:  Labs Reviewed   CBC WITH AUTO DIFFERENTIAL - Abnormal; Notable for the following components:       Result Value    RBC 3.32 (*)     Hemoglobin 10.1 (*)     Hematocrit 30.2 (*)     RDW 17.6 (*)     All other components within normal limits    Narrative:     Performed at:  [address],  [place], [address]   Phone [phone]   LIPASE - Abnormal; Notable for the following components:    Lipase 12.0 (*)     All other components within normal limits    Narrative:     Performed at:  [address],  [place], [address]   Phone [phone]   COMPREHENSIVE METABOLIC PANEL - Abnormal; Notable for the following components:    Potassium 3.2 (*)     Glucose 116 (*)     BUN 22 (*)     Calcium 6.6 (*)     Total Protein 4.4 (*)     Alb 2.6 (*)     AST 11 (*)     All other components within normal limits    Narrative:     Performed at:  [address],  [place], [address]   Phone [phone]   URINE RT REFLEX TO CULTURE       All other labs were within normal range or not returned as of this dictation.     EMERGENCY DEPARTMENT COURSE and DIFFERENTIAL DIAGNOSIS/MDM:   Vitals:    Vitals:    08/04/20 1018 08/04/20 1044 08/04/20 1148 08/04/20 1228   BP: (!) 121/96 (!) 147/89 130/75 139/78   Pulse: 99 113  102   Resp:  18  16   Temp:       TempSrc: SpO2:  99% 93% 94%   Weight:       Height:           Patient evaluated and previous record reviewed. Patient presents with left lower quadrant abdominal pain that began abruptly last night around 5:00. Vital signs notable for tachycardia. Physical exam is documented above. Lab work-up notable for white blood cell count within normal limits, potassium 3.2, lipase 12, creatinine within normal limits. CT abdomen pelvis obtained and shows large amount of pneumoperitoneum but without an identified source of the air. Spoke with general surgeon Dr. Lea Bustillo who recommended p.o. contrast and repeat CT scan and to cover patient with Zosyn. At this point time will handoff patient to Dr. Sai Hassan for further management. CONSULTS:  IP CONSULT TO GENERAL SURGERY    PROCEDURES:  Unless otherwise noted below, none     Critical Care  Performed by: Jesus Zamora MD  Authorized by: Jesus Zamora MD     Critical care provider statement:     Critical care time (minutes):  32    Critical care time was exclusive of:  Separately billable procedures and treating other patients and teaching time    Critical care was necessary to treat or prevent imminent or life-threatening deterioration of the following conditions: Pneumoperitoneum. Critical care was time spent personally by me on the following activities:  Development of treatment plan with patient or surrogate, discussions with consultants, evaluation of patient's response to treatment, examination of patient, obtaining history from patient or surrogate, ordering and performing treatments and interventions, ordering and review of laboratory studies, ordering and review of radiographic studies, pulse oximetry, re-evaluation of patient's condition and review of old charts          FINAL IMPRESSION      1. Abdominal pain, left lower quadrant    2.  Pneumoperitoneum          DISPOSITION/PLAN   DISPOSITION        PATIENT REFERRED TO:  No follow-up provider

## 2020-08-04 NOTE — H&P
Hospital Medicine History & Physical      PCP: Tj Miller MD    Date of Admission: 8/4/2020    Date of Service: Pt seen/examined on 8/4/20 and Admitted to Inpatient with expected LOS greater than two midnights due to medical therapy. Chief Complaint:  abd pain      History Of Present Illness:    77 y.o. male with NHL(Mantle cell), anemia, chronic pain who presented to [facility] with abd pain. Pt was just discharged on 7/20 for drug reaction(with skin sloughing) and was on prednisone. Hemonc attempted to wean down but required higher doses(inc'd to 60mg daily on 7/31). He noted sudden onset LLQ abd ain that was 9/10 severity and stabbing in nature. This radiated across his lower abdomen. No n/v/f/chills but did have a loose BM today. He took his ppi this am but came in due to lack of improvement.     ER course: gen surg consulted, hemonc consulted, given fentanl and dilaudid iv for pain, CTa/p x 2 obtained    Past Medical History:          Diagnosis Date    Blood transfusion     Cancer ([address]     basal and squamous cell skin ca    COPD, mild ([address]     Dermatitis 7/14/2020    Disease of blood and blood forming organ     History of blood transfusion     Medical history reviewed with no changes     Non Hodgkin's lymphoma ([address] 12/6/12    chemo started 12/26/12x6, then stem cell tx    Pneumonia     lymphoma, pneumo history    Pneumothorax Dec. 2012    Spontaneous pneumothorax     x 3 last 11/3/2011       Past Surgical History:          Procedure Laterality Date    CHEST TUBE INSERTION      x3    COLONOSCOPY  4/9    polyps q 5 yrs    COLONOSCOPY  5/15/13    FEMUR SURGERY      r/t fracture with retained hardware   [address]    broken arm    FRACTURE SURGERY  2003    femur    LYMPH NODE BIOPSY  12/11/12    right inguinal lymph node excision and biopsy    MIDDLE EAR SURGERY      OTHER SURGICAL HISTORY  6/7/12    stem cell transplant    SKIN BIOPSY      SKIN CANCER DESTRUCTION      STOMACH SURGERY      at age 7 weeks    THORACOSCOPY Left 2018    video assisted thoracoscopy,left upper lobe wedge resection;mechanical and chemical pleurodesis    TUNNELED VENOUS PORT PLACEMENT         Medications Prior to Admission:      Prior to Admission medications    Medication Sig Start Date End Date Taking? Authorizing Provider   predniSONE (DELTASONE) 20 MG tablet Take 20 mg by mouth daily Three pills qd   Yes Historical Provider, MD   pantoprazole (PROTONIX) 20 MG tablet Take 20 mg by mouth daily   Yes Historical Provider, MD   INCARTHUR ELLIPTA 62.5 MCG/INH AEPB INHALE 1 PUFF INTO THE LUNGS DAILY 20  Yes Kasey Ashley MD   diltiazem (CARTIA XT) 120 MG extended release capsule Take 1 capsule by mouth daily 19  Yes ANAID Bernard CNP   albuterol sulfate HFA (PROAIR HFA) 108 (90 Base) MCG/ACT inhaler Inhale 2 puffs into the lungs every 6 hours as needed for Wheezing or Shortness of Breath 19  Yes Kasey Ashley MD   megestrol (MEGACE) 40 MG tablet Take 200 mg by mouth 2 times daily   Yes Historical Provider, MD       Allergies:  Latex and Adhesive tape    Social History:      The patient currently lives at home    TOBACCO:   reports that he has been smoking cigarettes. He has a 25.00 pack-year smoking history. He has never used smokeless tobacco.  ETOH:   reports current alcohol use. E-Cigarettes Vaping or Juuling     Questions Responses    Vaping Use Never User    Start Date     Does device contain nicotine? Never    Quit Date     Vaping Type Unknown            Family History:       Reviewed in detail and negative for DM, CAD, Cancer, CVA.  Positive as follows:        Problem Relation Age of Onset    High Blood Pressure Father     Stroke Father 68    Alcohol Abuse Sister     Liver Disease Sister          age 55    [de-identified] Brother     COPD Brother     Cancer Brother         Brain / Lung       REVIEW OF SYSTEMS:   Pertinent positives as noted in the HPI. All other systems reviewed and negative. PHYSICAL EXAM PERFORMED:    /71   Pulse 98   Temp 98.1 °F (36.7 °C) (Oral)   Resp 16   Ht 6' 1\" (1.854 m)   Wt 150 lb (68 kg)   SpO2 98%   BMI 19.79 kg/m²     General appearance:  No apparent distress, appears stated age and cooperative. HEENT:  Normal cephalic, atraumatic without obvious deformity. Pupils equal, round, and reactive to light. Extra ocular muscles intact. Conjunctivae/corneas clear. Neck: Supple, with full range of motion. No jugular venous distention. Trachea midline. Respiratory:  Normal respiratory effort. Clear to auscultation, bilaterally without Rales/Wheezes/Rhonchi. Cardiovascular:  Regular rate and rhythm with normal S1/S2 without murmurs, rubs or gallops. Abdomen: Soft, mildly tender(just received iv pain meds), non-distended with dec'd bowel sounds. Musculoskeletal:  No clubbing, cyanosis or edema bilaterally. Full range of motion without deformity. Skin: Skin color, texture, turgor normal.  No rashes or lesions except diffuse erythema noted over entire body, small blistering noted on right hand, diffuse swelling/ansarca noted  Neurologic:  Neurovascularly intact without any focal sensory/motor deficits. Cranial nerves: II-XII intact, grossly non-focal.  Psychiatric:  Alert and oriented, thought content appropriate, normal insight  Capillary Refill: Brisk,< 3 seconds   Peripheral Pulses: +2 palpable, equal bilaterally       Labs:     Recent Labs     08/04/20  0940   WBC 6.9   HGB 10.1*   HCT 30.2*        Recent Labs     08/04/20  0940      K 3.2*      CO2 30   BUN 22*   CREATININE 1.0   CALCIUM 6.6*     Recent Labs     08/04/20  0940   AST 11*   ALT 16   BILITOT 0.6   ALKPHOS 69     No results for input(s): INR in the last 72 hours. No results for input(s): [employer] in the last 72 hours.     Urinalysis:      Lab Results   Component Value Date    NITRU Negative 08/04/2020    WBCUA 0-2 08/04/2020    BACTERIA Rare 08/04/2020    RBCUA 3-4 08/04/2020    BLOODU Negative 08/04/2020    SPECGRAV 1.020 08/04/2020    GLUCOSEU Negative 08/04/2020    GLUCOSEU NEGATIVE 11/30/2011       Radiology:     CXR: I have reviewed the CXR with the following interpretation: na  EKG:  I have reviewed the EKG with the following interpretation: na    CT ABDOMEN PELVIS WO CONTRAST Additional Contrast? Oral   Final Result   No extraluminal oral contrast to indicate a site of gastrointestinal   perforation         CT ABDOMEN PELVIS W IV CONTRAST Additional Contrast? None   Final Result   Addendum 1 of 1   ADDENDUM:   These results were communicated by telephone to Dr. Herbert Dahl at 11:06 a.m.    on   08/04/2020         Final          ASSESSMENT:    [address] Problems    Diagnosis Date Noted    Pneumoperitoneum [K66.8] 08/04/2020         PLAN:    abd pain- with Pneumoperitoneum noted on CT  -gen surg consulted, will atempt to tx nonoperatively  -Tele  -Npo except meds/ice chips  -Prn iv dilaudid ordered  IV zosyn started 8/4 and continued  -Gentle ivfs ordered    Normocytic Anemia-stable  -monitored labs    afib- controlled, unclear why not on AC  -continued on dilt    Anxiety -on atarax prn    Asthma- per emr  On incruse(sub spiriva here) and albuterol    Drug rash -ongoing, attempted to wean as outpt but did not tolerate, related to recent chemo for NHL  -Switched po steroids to iv soluemdrol q12, mgmt per hemonc    NHL- mgt per hemonc    DVT Prophylaxis: lovenox sq  Diet: Diet NPO Effective Now Exceptions are: Sips with Meds, Ice Chips  Code Status: Full Code    PT/OT Eval Status: not ordered    Dispo - pending workup       Piero Barba MD    Thank you Kylee Delvalle MD for the opportunity to be involved in this patient's care. If you have any questions or concerns please feel free to contact me at [phone].

## 2020-08-04 NOTE — TELEPHONE ENCOUNTER
Writer contacted Dr. García Conway,  ED provider to inform of 30 day readmission risk. Writer's attempt to contact ED provider was unsuccessful. In with a pt.

## 2020-08-04 NOTE — ED NOTES
Report given to [facility], floor nurse. Patient A&O, VSS, and wife at bedside.       Sanjeev Carey RN  08/04/20 [id number]

## 2020-08-05 NOTE — PROGRESS NOTES
[facility] SURGERY    PATIENT NAME: Jessica Short     TODAY'S DATE: 8/5/2020    CHIEF COMPLAINT: abd pain    INTERVAL HISTORY/HPI:    Pt with less pain, no nausea, no emesis, having flatus and bms. REVIEW OF SYSTEMS:  Pertinent positives and negatives as per interval history section    OBJECTIVE:  VITALS:  /75   Pulse 99   Temp 98.6 °F (37 °C) (Oral)   Resp 17   Ht 6' 1\" (1.854 m)   Wt 164 lb 3.9 oz (74.5 kg)   SpO2 96%   BMI 21.67 kg/m²     INTAKE/OUTPUT:    I/O last 3 completed shifts: In: 0727 [I.V.:600; IV Piggyback:1050]  Out: 500 [Urine:500]  I/O this shift:  In: -   Out: 350 [Urine:350]    CONSTITUTIONAL:  awake and alert  LUNGS:  Respirations easy and unlabored, no crackles or wheezing  CARD:  regular rate and rhythm  ABDOMEN:  normal bowel sounds, soft, non-distended, less tender LLQ     Data:  CBC:   Recent Labs     08/04/20  0940 08/05/20  0600   WBC 6.9 9.7   HGB 10.1* 10.0*   HCT 30.2* 29.7*    147     BMP:    Recent Labs     08/04/20  0940 08/05/20  0600    140   K 3.2* 2.9*    102   CO2 30 28   BUN 22* 18   CREATININE 1.0 0.8   GLUCOSE 116* 63*     Hepatic:   Recent Labs     08/04/20  0940   AST 11*   ALT 16   BILITOT 0.6   ALKPHOS 69     Mag:      Recent Labs     08/05/20  0600   MG 1.20*      Phos:   No results for input(s): PHOS in the last 72 hours. INR: No results for input(s): INR in the last 72 hours. Radiology Review:  *Imaging personally reviewed by me. NA      ASSESSMENT AND PLAN:  78 yo with bowel perforation  1. Symptoms improving  2. Start clear liquid diet  3. Continue IV abx  4.   Repeat CT friday     Electronically signed by Jaren Fleming, [address]

## 2020-08-05 NOTE — PROGRESS NOTES
clubbing, cyanosis or edema bilaterally. Full range of motion without deformity. Skin: Skin color, texture, turgor normal.  No rashes or lesions except diffuse erythema noted over entire body, small blistering noted on right hand, diffuse swelling/ansarca noted  Neurologic:  Neurovascularly intact without any focal sensory/motor deficits. Cranial nerves: II-XII intact, grossly non-focal.  Psychiatric:  Alert and oriented, thought content appropriate, normal insight  Capillary Refill: Brisk,< 3 seconds   Peripheral Pulses: +2 palpable, equal bilaterally          Labs:   Recent Labs     08/04/20  0940 08/05/20  0600   WBC 6.9 9.7   HGB 10.1* 10.0*   HCT 30.2* 29.7*    147     Recent Labs     08/04/20  0940 08/05/20  0600    140   K 3.2* 2.9*    102   CO2 30 28   BUN 22* 18   CREATININE 1.0 0.8   CALCIUM 6.6* 6.3*     Recent Labs     08/04/20  0940   AST 11*   ALT 16   BILITOT 0.6   ALKPHOS 69     No results for input(s): INR in the last 72 hours. No results for input(s): Paco Smoker in the last 72 hours.     Urinalysis:      Lab Results   Component Value Date    NITRU Negative 08/04/2020    WBCUA 0-2 08/04/2020    BACTERIA Rare 08/04/2020    RBCUA 3-4 08/04/2020    BLOODU Negative 08/04/2020    SPECGRAV 1.020 08/04/2020    GLUCOSEU Negative 08/04/2020    GLUCOSEU NEGATIVE 11/30/2011       Radiology:  CT ABDOMEN PELVIS WO CONTRAST Additional Contrast? Oral   Final Result   No extraluminal oral contrast to indicate a site of gastrointestinal   perforation         CT ABDOMEN PELVIS W IV CONTRAST Additional Contrast? None   Final Result   Addendum 1 of 1   ADDENDUM:   These results were communicated by telephone to Dr. Asa Hurst at 11:06 a.m.    on   08/04/2020         Final              Assessment/Plan:    Active Hospital Problems    Diagnosis    Pneumoperitoneum [K66.8]    Abdominal pain, left lower quadrant [R10.32]          abd pain- with Pneumoperitoneum noted on CT  -gen surg consulted, will atempt to tx nonoperatively  -Tele  -Npo except meds/ice chips  -Prn iv dilaudid ordered  IV zosyn started 8/4 and continued  -Gentle ivfs ordered     Normocytic Anemia-stable  -monitored labs     Anasarca- due to steroids?  -hold diuretics for now given above    afib- controlled, unclear why not on AC  -continued on dilt     Anxiety -on atarax prn     Asthma- per emr  On incruse(sub spiriva here) and albuterol     Drug rash -ongoing, attempted to wean as outpt but did not tolerate, related to recent chemo for NHL  -Switched po steroids to iv soluemdrol q12, mgmt per hemonc     NHL- mgt per hemonc  -held home megace    DVT Prophylaxis: lovenox sq  Diet: Diet NPO Effective Now Exceptions are: Sips with Meds, Ice Chips  Code Status: Full Code    PT/OT Eval Status: not ordered    Dispo - pending improvement    Asad Elizabeth MD

## 2020-08-05 NOTE — CARE COORDINATION
Per chart review noted patient is currently inpatient at [facility]. Will continue to monitor for discharge.      Darron HANLEYN, RN, [facility]  Care Transition Nurse   [phone]

## 2020-08-05 NOTE — CARE COORDINATION
CASE MANAGEMENT INITIAL ASSESSMENT      Reviewed chart and completed assessment with: patient and wife  Explained Case Management role/services. Primary contact information: Karol Pandya [phone]    Admit date/status: 8/4/20  Diagnosis: pneumoperitoneum   Is this a Readmission?:  If so, please indicate possible factors that led to readmission. Yes, previous appropriate    Insurance: medicare primary and medical mutual secondary   Precert required for SNF -yes  3 night stay required -no    Living arrangements, Adls, care needs, prior to admission: pt lives in a 2 story house with wife. Independent in all ADL's     Transportation: private    [address] [employer] [place] [facility] [employer] in the home and/or outpatient, prior to admission: none    PT/OT recs: 2 [place] Notification (HEN): not initiated    Barriers to discharge: none    Plan/comments:   Spoke to patient and wife at bedside. Normally very independent and denies any needs at this time. Please advise should any needs arise.      ECOC on chart for MD signature     Justin Barr, RN

## 2020-08-05 NOTE — PLAN OF CARE
Problem: Nutrition  Goal: Optimal nutrition therapy  Outcome: Ongoing  Note: Nutrition Problem #1: Inadequate oral intake  Intervention: Food and/or Nutrient Delivery: Continue Current Diet, Start Oral Nutrition Supplement(Consider TPN if unable to further advance PO diet)  Nutritional Goals: Pt will tolerate most appropriate form of nutrition this admission

## 2020-08-05 NOTE — CONSULTS
Department of General Surgery Consult    PATIENT NAME: Nichelle Humphries   YOB: 1945    ADMISSION DATE: 8/4/2020  8:53 AM      TODAY'S DATE: 8/4/2020    Reason for Consult:  pneumoperitoneum    Chief Complaint: llq pain    Requesting Physician:  Gabriel Castaneda    HISTORY OF PRESENT ILLNESS:              The patient is a 77 y.o. male who presents with llq pain. Reports pain started yesterday evening and was acute and sharp. Currently improved but not resolved. Some nausea but no emesis. No fevers. Had been on prednisone and dosage increased to 61 recently. Has had diarrhea but no constipation. Due for chemotherapy soon. .    Past Medical History:        Diagnosis Date    Blood transfusion     Cancer ([address]     basal and squamous cell skin ca    COPD, mild ([address]     Dermatitis 7/14/2020    Disease of blood and blood forming organ     History of blood transfusion     Medical history reviewed with no changes     Non Hodgkin's lymphoma ([address] 12/6/12    chemo started 12/26/12x6, then stem cell tx    Pneumonia     lymphoma, pneumo history    Pneumothorax Dec. 2012    Spontaneous pneumothorax     x 3 last 11/3/2011       Past Surgical History:        Procedure Laterality Date    CHEST TUBE INSERTION      x3    COLONOSCOPY  4/9    polyps q 5 yrs    COLONOSCOPY  5/15/13    FEMUR SURGERY      r/t fracture with retained hardware    FRACTURE SURGERY  1960    broken arm    FRACTURE SURGERY  2003    femur    LYMPH NODE BIOPSY  12/11/12    right inguinal lymph node excision and biopsy    MIDDLE EAR SURGERY      OTHER SURGICAL HISTORY  6/7/12    stem cell transplant    SKIN BIOPSY      SKIN CANCER DESTRUCTION      STOMACH SURGERY      at age 7 weeks    THORACOSCOPY Left 01/08/2018    video assisted thoracoscopy,left upper lobe wedge resection;mechanical and chemical pleurodesis    TUNNELED VENOUS PORT PLACEMENT         Current Medications:   Current Facility-Administered Medications: [START ON 8/5/2020] tiotropium (SPIRIVA RESPIMAT) 2.5 MCG/ACT inhaler 2 puff, 2 puff, Inhalation, Daily  hydrOXYzine (ATARAX) tablet 25 mg, 25 mg, Oral, BID PRN  [START ON 8/5/2020] dilTIAZem (CARDIZEM CD) extended release capsule 120 mg, 120 mg, Oral, Daily  sodium chloride flush 0.9 % injection 10 mL, 10 mL, Intravenous, 2 times per day  sodium chloride flush 0.9 % injection 10 mL, 10 mL, Intravenous, PRN  acetaminophen (TYLENOL) tablet 650 mg, 650 mg, Oral, Q6H PRN **OR** acetaminophen (TYLENOL) suppository 650 mg, 650 mg, Rectal, Q6H PRN  polyethylene glycol (GLYCOLAX) packet 17 g, 17 g, Oral, Daily PRN  promethazine (PHENERGAN) tablet 12.5 mg, 12.5 mg, Oral, Q6H PRN **OR** ondansetron (ZOFRAN) injection 4 mg, 4 mg, Intravenous, Q6H PRN  [START ON 8/5/2020] enoxaparin (LOVENOX) injection 40 mg, 40 mg, Subcutaneous, Daily  [START ON 8/5/2020] methylPREDNISolone sodium (SOLU-MEDROL) injection 30 mg, 30 mg, Intravenous, Q12H  lactated ringers infusion, , Intravenous, Continuous  HYDROmorphone (DILAUDID) injection 0.5 mg, 0.5 mg, Intravenous, Q4H PRN  albuterol sulfate  (90 Base) MCG/ACT inhaler 2 puff, 2 puff, Inhalation, Q4H PRN  piperacillin-tazobactam (ZOSYN) 3.375 g in dextrose 5 % 50 mL IVPB extended infusion (mini-bag), 3.375 g, Intravenous, Q8H  mineral oil-hydrophilic petrolatum (AQUAPHOR) ointment, , Topical, BID PRN  [START ON 8/5/2020] pantoprazole (PROTONIX) tablet 20 mg, 20 mg, Oral, QAM AC  megestrol (MEGACE) tablet 40 mg, 40 mg, Oral, BID  Prior to Admission medications    Medication Sig Start Date End Date Taking?  Authorizing Provider   predniSONE (DELTASONE) 20 MG tablet Take 20 mg by mouth daily Three pills qd   Yes Historical Provider, MD   pantoprazole (PROTONIX) 20 MG tablet Take 20 mg by mouth daily   Yes Historical Provider, MD   INCRU ELLIPTA 62.5 MCG/INH AEPB INHALE 1 PUFF INTO THE LUNGS DAILY 7/1/20  Yes Jayjay Angela MD   diltiazem (CARTIA XT) 120 MG extended release capsule Take 1 capsule by mouth daily 19  Yes ANAID Patrick - CNP   albuterol sulfate HFA (PROAIR HFA) 108 (90 Base) MCG/ACT inhaler Inhale 2 puffs into the lungs every 6 hours as needed for Wheezing or Shortness of Breath 19  Yes Judi Hoang MD   megestrol (MEGACE) 40 MG tablet Take 200 mg by mouth 2 times daily   Yes Historical Provider, MD        Allergies:  Latex and Adhesive tape    Social History:   TOBACCO:  unknown  ETOH:  unknown    Family History:        Problem Relation Age of Onset    High Blood Pressure Father     Stroke Father 68    Alcohol Abuse Sister     Liver Disease Sister          age 55    [de-identified] Brother     COPD Brother     Cancer Brother         Brain / Lung       REVIEW OF SYSTEMS:  CONSTITUTIONAL:  positive for  fatigue  HEENT:  negative  RESPIRATORY:  negative  CARDIOVASCULAR:  negative  GASTROINTESTINAL:  negative except for nausea, diarrhea and abdominal pain  GENITOURINARY:  negative  HEMATOLOGIC/LYMPHATIC:  negative  NEUROLOGICAL:  Negative  * All other ROS reviewed and negative. PHYSICAL EXAM:  VITALS:  /71   Pulse 98   Temp 98.1 °F (36.7 °C) (Oral)   Resp 16   Ht 6' 1\" (1.854 m)   Wt 150 lb (68 kg)   SpO2 98%   BMI 19.79 kg/m²   24HR INTAKE/OUTPUT:    I/O last 3 completed shifts: In: 1050 [IV Piggyback:1050]  Out: -   I/O this shift:   In: 0   Out: 100 [Urine:100]    CONSTITUTIONAL:  alert, mild distress and normal weight  EYES:  PERRL, sclera clear  ENT:  Normocephalic,atraumatic, without obvious abnormality  NECK:  supple, symmetrical, trachea midline  LUNGS: Resp effort easy and unlabored, no crackles or wheezing  CARDIOVASCULAR:  NO JVD, regular rate and rhythm and no murmur noted  ABDOMEN:  , hypoactive bowel sounds, soft, non-distended, tenderness noted in the left lower quadrant, voluntary guarding present, no masses palpated and   MUSCULOSKELETAL: No clubbing or cyanosis, 0+ pitting edema lower extremities  NEUROLOGIC:  Mental Status Exam:

## 2020-08-05 NOTE — CONSULTS
ONCOLOGY HEMATOLOGY CARE CONSULT NOTE      Requesting Physician:  Dr. Infante Fresh:  Mantle cell NHL        HISTORY OF PRESENT ILLNESS:      Mr. Ferny Ramsay  is a 77 y.o. male we are seeing in consultation for mantle cell NHL. This patient has a history of mantle cell NHL. Oncologic history is outlined below:    Stage ARANZA mantle cell NHL:  1. Admitted to [facility] between 12/06 - 12/11/2012 for SOB. Had a CT scan on the day of admission that showed a large right pneumothorax and mediastinal and axillary LAD. A CT scan the following day showed retroperitoneal and inguinal lymphadenopathy as well. He did require a chest tube. Pleural fluid was negative. Had an excisional biopsy of a right inguinal LN that showed mantle cell NHL. 2. Bone marrow biopsy, 12/13/2012, showed extensive involvement by mantle cell NHL. 2. Received R-CHOP x 6 cycles between 12/26/2012 - 4/10/2013. 3. High-dose BEAM with stem-cell rescue on 6/13/2013. Had a CR. 4. Followed until a CT scan on 9/15/2014 showed interval growth of axillary and infraclavicular adenopathy. A CT-guided biopsy on 9/26/2014 showed mantle cell NHL. 5. Received Ibrutinib between 10/04/2014 - 10/11/2019. Treatment was discontinued after a CT scan on 8/29/2019 showed a cavitary mass in the pelvis. A subsequent colonoscopy on 9/25/2019 showed submucosal inflammation in the sigmoid colon which was biopsied and shown to be mantle cell lymphoma. 6. Signed consent for LYM [id number] on 10/15/2019. This was a phase 2 study of [id number], a PI3K inhibitor, in relapsed or refractory mantle cell lymphoma. He received treatment between 10/30/2019 - 7/14/2020. Treatment was discontinued due to exfoliative dermatitis. A. Restaging prior to starting treatment on LYM [id number]:   i. Bone marrow biopsy, 10/22/2019, showed 50% cellularity with trilineage hematopoiesis and small lymphoid aggregates.  Flow cytometry showed 10% clonal B cells consistent with mantle cell NHL. 46,XY[20]. ii. CT neck/chest/abdomen/pelvis, 10/23/2019, showed a 1.6 x 1.0 cm LN posterior to the left sternocleidomastoid muscle, a 2.3 x 1.5 cm AP window lymph node, a 2.3 x 1.4 cm left paratracheal lymph node, a 2.3 x 2.0 cm left hilar lymph node, a 3.2 x 2.0 cm subcarinal lymph node, a 3.0 x 3.5 cm left common iliac lymph node, a 2.2 x 1.9 cm left periaortic lymph node, a 5.6 x 2.6 cm hypodensity marginating the left iliac vessels, and a 10.0 x 7.7 cm cavitary mass in the pelvis   B. C1D1 was 10/30/2019.     7. Plan to start R-Bendamustine on 8/05/2020. He was admitted to [address] [place] [address] on 8/04/2020 with abdominal pain. A CT scan showed a large amount of pneumoperitoneum. Sigmoid diverticulosis was identified, but ne definite site of diverticulitis/perforation was identified. Currently, he is on room air and is afebrile. He is NPO and on IV LR as well as Zosyn. No nausea. 1 BM last night. Not painful presently, but has pain that is intermittent.       Past Medical History:    Past Medical History:   Diagnosis Date    Blood transfusion     Cancer ([address]     basal and squamous cell skin ca    COPD, mild ([address]     Dermatitis 7/14/2020    Disease of blood and blood forming organ     History of blood transfusion     Medical history reviewed with no changes     Non Hodgkin's lymphoma ([address] 12/6/12    chemo started 12/26/12x6, then stem cell tx    Pneumonia     lymphoma, pneumo history    Pneumothorax Dec. 2012    Spontaneous pneumothorax     x 3 last 11/3/2011     Past Surgical History:    Past Surgical History:   Procedure Laterality Date    CHEST TUBE INSERTION      x3    COLONOSCOPY  4/9    polyps q 5 yrs    COLONOSCOPY  5/15/13    FEMUR SURGERY      r/t fracture with retained hardware    FRACTURE SURGERY  1960    broken arm    FRACTURE SURGERY  2003    femur    LYMPH NODE BIOPSY  12/11/12    right inguinal lymph node excision and biopsy    MIDDLE EAR SURGERY      OTHER puff Inhalation Q4H PRN Suhas Noyola MD        piperacillin-tazobactam (ZOSYN) 3.375 g in dextrose 5 % 50 mL IVPB extended infusion (mini-bag)  3.375 g Intravenous Q8H Suhas Noyola MD 12.5 mL/hr at 08/05/20 0353 3.375 g at 08/05/20 0353    mineral oil-hydrophilic petrolatum (AQUAPHOR) ointment   Topical BID PRN Suhas Noyola MD        pantoprazole (PROTONIX) tablet 20 mg  20 mg Oral QAM AC Garcia Silva MD        megestrol (MEGACE) tablet 40 mg  40 mg Oral BID Suhas Noyola MD   40 mg at 08/04/20 2915     Allergies: Allergies   Allergen Reactions    Latex Rash    Adhesive Tape      Rash       Social History:   Social History     Socioeconomic History    Marital status:      Spouse name: Amol Barcenas Number of children: Not on file    Years of education: 15    Highest education level: Not on file   Occupational History     Comment: fire dept retired   Social Needs    Financial resource strain: Not on file    Food insecurity     Worry: Not on file     Inability: Not on file   KDPOF needs     Medical: Not on file     Non-medical: Not on file   Tobacco Use    Smoking status: Current Every Day Smoker     Packs/day: 0.50     Years: 50.00     Pack years: 25.00     Types: Cigarettes    Smokeless tobacco: Never Used    Tobacco comment: 1-2 cigs daily - 3/5/20   Substance and Sexual Activity    Alcohol use:  Yes     Alcohol/week: 0.0 standard drinks     Comment: 8 pack beer weekly    Drug use: No    Sexual activity: Never     Partners: Female   Lifestyle    Physical activity     Days per week: Not on file     Minutes per session: Not on file    Stress: Not on file   Relationships    Social connections     Talks on phone: Not on file     Gets together: Not on file     Attends [employer] service: Not on file     Active member of club or organization: Not on file     Attends meetings of clubs or organizations: Not on file     Relationship status: Not on file    Intimate partner violence     Fear of current or ex partner: Not on file     Emotionally abused: Not on file     Physically abused: Not on file     Forced sexual activity: Not on file   Other Topics Concern    Not on file   Social History Narrative    ** Merged History Encounter **               Family History:     Family History   Problem Relation Age of Onset    High Blood Pressure Father     Stroke Father 68    Alcohol Abuse Sister     Liver Disease Sister          age 55    [de-identified] Brother     COPD Brother     Cancer Brother         Brain / Lung     REVIEW OF SYSTEMS:    Review of Systems    PHYSICAL EXAM:      Vitals:  /64   Pulse 105   Temp 98 °F (36.7 °C) (Oral)   Resp 15   Ht 6' 1\" (1.854 m)   Wt 164 lb 3.9 oz (74.5 kg)   SpO2 95%   BMI 21.67 kg/m²     CONSTITUTIONAL:  awake, alert, cooperative, no apparent distress, and appears stated age NAD  EYES:  pupils equal, round and reactive to light, extra ocular muscles intact,sclera clear, conjunctiva normal  NECK:  Supple, symmetrical, trachea midline, no adenopathy, thyroid symmetric, not enlarged and no tenderness, skin normal  HEMATOLOGIC/LYMPHATICS:  no cervical lymphadenopathy, nosupraclavicular lymphadenopathy, no axillary lymphadenopathy and no inguinal lymphadenopathy  LUNGS:  No increased work of breathing, good air exchange, clear to auscultation bilaterally, no crackles or wheezing  CARDIOVASCULAR:  , regular rate and rhythm, normal S1 and S2, no S3 or S4, and no murmur noted  ABDOMEN:  No scars, normal bowel sounds, soft, non-distended, non-tender, no masses palpated, no hepatosplenomegally      MUSCULOSKELETAL:  There is no redness, warmth, or swelling of the joints. Full range of motion noted. Motor strength is 5 out of 5 all extremities bilaterally. NEUROLOGIC:  Awake, alert, oriented to name, place andtime. Cranial nerves II-XII are grossly intact. Motor is 5 out of 5 bilaterally.   SKIN:  no bruising or bleeding      DATA:    PT/INR:    Recent Labs     08/04/20  0940   PROT 4.4*     PTT:No results for input(s): APTT in the last 72 hours. CMP:    Lab Results   Component Value Date     08/04/2020    K 3.2 08/04/2020    K 4.5 07/14/2020     08/04/2020    CO2 30 08/04/2020    BUN 22 08/04/2020    PROT 4.4 08/04/2020    PROT 5.8 07/07/2017     Magnesium:    Lab Results   Component Value Date    MG 1.90 01/10/2018     Phosphorus:  No components found for: PO4  Calcium:  No components found for: CA  CBC:    Lab Results   Component Value Date    WBC 9.7 08/05/2020    RBC 3.28 08/05/2020    RBC 4.12 07/07/2017    HGB 10.0 08/05/2020    HCT 29.7 08/05/2020    MCV 90.6 08/05/2020    RDW 18.0 08/05/2020     08/05/2020     DIFF:    Lab Results   Component Value Date    MCV 90.6 08/05/2020    RDW 18.0 08/05/2020      LDH:  @labcrnt(LDH)@  Uric Acid:  @labcrnt(URIC)@    Radiology Review:  CT ABDOMEN PELVIS W IV CONTRAST (8/04/2020):   Impression    Large amount of pneumoperitoneum.  The exact origin of the pneumoperitoneum    is uncertain.  There is extensive small bowel wall thickening, however the    mesenteric vessels are patent, and there are no other findings of ischemia    such as pneumatosis or mesenteric/portal venous gas.  There is diverticulosis    of the sigmoid colon, however there are no definite findings of    diverticulitis although perforated diverticulum would be suggested by the    history of left lower quadrant pain.  The diffuse small bowel wall thickening    is compatible with nonspecific enteritis                Problem List  Patient Active Problem List   Diagnosis    Tobacco abuse disorder    Primary spontaneous pneumothorax    COPD, severe ([address]    Autologous bone marrow transplantation    Mantle cell lymphoma of lymph nodes of multiple sites ([address]    Encounter for long-term (current) use of high-risk medication    Chest pain    Spontaneous pneumothorax    PAF (paroxysmal atrial

## 2020-08-06 NOTE — PLAN OF CARE
Patient in bed with non-skid socks on, bed locked in lowest position, fall wrist band on, fall light on, bed alarm on, bedside table and call light in reach. Patient verbalized understanding of need to call out for assistance.

## 2020-08-06 NOTE — PROGRESS NOTES
ONCOLOGY HEMATOLOGY CARE PROGRESS NOTE      SUBJECTIVE:     Feels well, wife at bedside. No other issues. Advancing diet today. ROS:   The remaining 10 point review of symptoms is unremarkable. OBJECTIVE        Physical    VITALS:  /63   Pulse 86   Temp 98.4 °F (36.9 °C) (Oral)   Resp 17   Ht 6' 1\" (1.854 m)   Wt 160 lb 14.4 oz (73 kg)   SpO2 96%   BMI 21.23 kg/m²   TEMPERATURE:  Current - Temp: 98.4 °F (36.9 °C); Max - Temp  Av.2 °F (36.8 °C)  Min: 97.4 °F (36.3 °C)  Max: 98.7 °F (37.1 °C)  PULSE OXIMETRY RANGE: SpO2  Av.5 %  Min: 92 %  Max: 96 %  24HR INTAKE/OUTPUT:      Intake/Output Summary (Last 24 hours) at 2020 1144  Last data filed at 2020 1116  Gross per 24 hour   Intake 60 ml   Output 680 ml   Net -620 ml       CONSTITUTIONAL:  awake, alert, cooperative, no apparent distress, HEENT oral pharynx , no scleral icterus  HEMATOLOGIC/LYMPHATICS:  no cervical lymphadenopathy, no supraclavicular lymphadenopathy, no axillary lymphadenopathy and no inguinal lymphadenopathy  LUNGS:  No increased work of breathing, good air exchange, clear to auscultation bilaterally, no crackles or wheezing  CARDIOVASCULAR:  , regular rate and rhythm, normal S1 and S2, no S3 or S4, and no murmur noted  ABDOMEN:  No scars, normal bowel sounds, soft, non-distended, non-tender, no masses palpated, no hepatosplenomegally  MUSCULOSKELETAL:  There is no redness, warmth, or swelling of the joints. EXTREMETIES: widespread anasarca, LUE worse than RUE   NEUROLOGIC:  Awake, alert, oriented to name, place and time. Cranial nerves II-XII are grossly intact. Motor is 5 out of 5 bilaterally.    SKIN:  no bruising or bleeding      Data      Recent Labs     20  0940 20  0600 20  0615   WBC 6.9 9.7 7.8   HGB 10.1* 10.0* 9.4*   HCT 30.2* 29.7* 28.0*    147 145   MCV 91.1 90.6 91.3        Recent Labs     20  0940 20  0600 20  1700 08/06/20  0615    140  --  138   K 3.2* 2.9* 4.2 4.1    102  --  102   CO2 30 28  --  28   BUN 22* 18  --  14   CREATININE 1.0 0.8  --  0.8     Recent Labs     08/04/20  0940   AST 11*   ALT 16   BILITOT 0.6   ALKPHOS 69       Magnesium:    Lab Results   Component Value Date    MG 1.20 08/05/2020    MG 1.90 01/10/2018    MG 2.20 01/09/2018         Problem List  Patient Active Problem List   Diagnosis    Tobacco abuse disorder    Primary spontaneous pneumothorax    COPD, severe ([address]    Autologous bone marrow transplantation    Mantle cell lymphoma of lymph nodes of multiple sites ([address]    Encounter for long-term (current) use of high-risk medication    Chest pain    Spontaneous pneumothorax    PAF (paroxysmal atrial fibrillation) ([address]    Typical atrial flutter ([address]    Cigarette smoker    Dermatitis    Pneumoperitoneum    Abdominal pain, left lower quadrant       ASSESSMENT AND PLAN:      1.) Mantle cell NHL  - Diagnosed initially in in 2012.  - Recently status post treatment with 3rd-line [id number] in the context of a clinical trial between 10/30/2019 - 7/14/2020. - A recent CT scan on 7/31/2012 had shown slight progression. The plan had been for R-Bendamustine to start (8/05/2020) but we will need to hold chemo.     2.) Exfoliative dermatitis  - Study drug was discontinued due to this adverse event. - We have been treating with Prednisone (Methylpred as an inpatient), foamy cleanser in the AM then an emollient (Aquaphor). - Because the dermatitis had initially improved, but then was beginning to flare again, I had feared that the dermatitis was a consequence of untreated NHL. This was part of the motivation for proceeding on with 4th-line therapy this week.      3.) Bowel perforation  - Presently treating conservatively with IVF, IV antibitoics, NPO.  - Improving, conservative measures for now, clear liq diet and IV Zosyn      4.) Anasarca , chronic - venous dopplers negative   - Had been managing with Torsemide as an outpatient, as it seemed more effective than PO Lasix due to its greater oral bioavailablilty and longer half-life. - This issue is on hold for now.     5.) Atrial fibrillation     6.) COPD     Thank you for asking me to see the patient.

## 2020-08-06 NOTE — PROGRESS NOTES
deformity. Skin: Skin color, texture, turgor normal.  No rashes or lesions except diffuse erythema noted over entire body, small blistering noted on right hand, diffuse swelling/ansarca noted  Neurologic:  Neurovascularly intact without any focal sensory/motor deficits. Cranial nerves: II-XII intact, grossly non-focal.  Psychiatric:  Alert and oriented, thought content appropriate, normal insight  Capillary Refill: Brisk,< 3 seconds   Peripheral Pulses: +2 palpable, equal bilaterally        Labs:   Recent Labs     08/04/20  0940 08/05/20  0600 08/06/20  0615   WBC 6.9 9.7 7.8   HGB 10.1* 10.0* 9.4*   HCT 30.2* 29.7* 28.0*    147 145     Recent Labs     08/04/20  0940 08/05/20  0600 08/05/20  1700 08/06/20  0615    140  --  138   K 3.2* 2.9* 4.2 4.1    102  --  102   CO2 30 28  --  28   BUN 22* 18  --  14   CREATININE 1.0 0.8  --  0.8   CALCIUM 6.6* 6.3*  --  6.5*     Recent Labs     08/04/20  0940   AST 11*   ALT 16   BILITOT 0.6   ALKPHOS 69     No results for input(s): INR in the last 72 hours. No results for input(s): Dulcie Mean in the last 72 hours.     Urinalysis:      Lab Results   Component Value Date    NITRU Negative 08/04/2020    WBCUA 0-2 08/04/2020    BACTERIA Rare 08/04/2020    RBCUA 3-4 08/04/2020    BLOODU Negative 08/04/2020    SPECGRAV 1.020 08/04/2020    GLUCOSEU Negative 08/04/2020    GLUCOSEU NEGATIVE 11/30/2011       Radiology:  CT ABDOMEN PELVIS WO CONTRAST Additional Contrast? Oral   Final Result   No extraluminal oral contrast to indicate a site of gastrointestinal   perforation         CT ABDOMEN PELVIS W IV CONTRAST Additional Contrast? None   Final Result   Addendum 1 of 1   ADDENDUM:   These results were communicated by telephone to Dr. Levi Ayers at 11:06 a.m.    on   08/04/2020         Final              Assessment/Plan:    Active Hospital Problems    Diagnosis    Pneumoperitoneum [K66.8]    Abdominal pain, left lower quadrant [R10.32]       abd pain- with Pneumoperitoneum noted on CT  -gen surg consulted, will atempt to tx nonoperatively  -Tele  -Npo except meds/ice chips initially, advanced as tolerated  -Prn iv dilaudid ordered  IV zosyn started 8/4 and continued  -Gentle ivfs ordered     Normocytic Anemia-stable  -monitored labs     Anasarca- due to steroids?  -hold diuretics for now given above     afib- controlled, unclear why not on AC  -continued on dilt     Anxiety -on atarax prn     Asthma- per emr  On incruse(sub spiriva here) and albuterol     Drug rash -ongoing, attempted to wean as outpt but did not tolerate, related to recent chemo for NHL  -Switched po steroids to iv soluemdrol q12, mgmt per hemonc     NHL- mgt per [facility]mon[facility]  -held home megace     DVT Prophylaxis: lovenox sq  Diet: DIET CLEAR LIQUID;  Dietary Nutrition Supplements: Clear Liquid Oral Supplement  Code Status: Full Code    PT/OT Eval Status: not ordered    Dispo - pending improvement, surg Caroline Munson MD

## 2020-08-06 NOTE — PROGRESS NOTES
Patient is at high risk for continued skin breakdown. RN reminded the patient to turn in bed. A specialty bed was offered- patient refused but he did accept a wedge pillow to place under him and relieve pressure. RN will continue to monitor.

## 2020-08-07 NOTE — PROGRESS NOTES
Comprehensive Nutrition Assessment    Type and Reason for Visit:  Reassess    Nutrition Recommendations/Plan:   1. Advance diet as medically feasible   2. Resume/advance ONS when feasible   3. Consider TPN administration if unable to advance PO diet, consult dietitian  4. Monitor nutrition adequacy, pertinent labs, bowel habits, wt changes, and clinical progress    Nutrition Assessment:  Pt nutritionally declining AEB continued NPO/clear liquid diet status. Pt NPO for repeat CT with PO contrast today. Surgery continuing with conservative treatement. Recommend advancing diet as medically feasible or consider TPN administation. Will continue to monitor. Malnutrition Assessment:  Malnutrition Status:   At risk for malnutrition (Comment)      Estimated Daily Nutrient Needs:  Energy (kcal):  9495-2023 kcal; Weight Used for Energy Requirements:  Current(74.5 kg)     Protein (g):   g; Weight Used for Protein Requirements:  Current(1.2-1.5 g/kg)        Fluid (ml/day):  1 ml/kcal; Weight Used for Fluid Requirements:  Current      Nutrition Related Findings:  BM x1 on 8/7 (loose), +4 generalized edema      Wounds:  Pressure Ulcer, Stage II       Current Nutrition Therapies:    Dietary Nutrition Supplements: Clear Liquid Oral Supplement  Diet NPO, After Midnight Exceptions are: Sips with Meds, Ice Chips  Current Parenteral Nutrition Orders:  · Goal PN Orders Provides: Clinimix 5/20 at goal rate of 90 mL/hr to provide 108 g protein and 1901 kcal. 250 mL bags of 20% lipids 5x weekly for additional 357 kcal daily, total kcals of 2258 kcal. GIR: 4.0      Anthropometric Measures:  · Height: 6' 1\" (185.4 cm)  · Current Body Weight: 160 lb (72.6 kg)   · Admission Body Weight: 164 lb (74.4 kg)(bedscale)    · Usual Body Weight: (148-150 lb per pt)     · Ideal Body Weight: 184 lbs; % Ideal Body Weight 89.1 %   · BMI: 21.1  · BMI Categories: Underweight (BMI less than 22) age over 72       Nutrition Diagnosis:   · Inadequate oral intake related to inadequate protein-energy intake, lack or limited access to food as evidenced by NPO or clear liquid status due to medical condition, wounds, diarrhea      Nutrition Interventions:   Food and/or Nutrient Delivery:  Continue NPO(advance as medically feasible)  Coordination of Nutrition Care:  Continued Inpatient Monitoring    Goals:  Pt will tolerate most appropriate form of nutrition this admission       Nutrition Monitoring and Evaluation:   Food/Nutrient Intake Outcomes:  Food and Nutrient Intake, Supplement Intake, Parenteral Nutrition Intake/Tolerance  Physical Signs/Symptoms Outcomes:  Biochemical Data, Weight     Discharge Planning:     Too soon to determine     Electronically signed by Sailaja Lau MS, RD, LD on 8/7/20 at 1:56 PM EDT    Contact: Office: [phone]

## 2020-08-07 NOTE — PROGRESS NOTES
Hospitalist Progress Note      PCP: Latia Rodriguez MD    Date of Admission: 8/4/2020    Chief Complaint: abd pain     Hospital Course:       Subjective:  ongoing intermittent pain, tolerating clears, pending CT, wife at bedside    Medications:  Reviewed    Infusion Medications   Scheduled Medications    potassium chloride  10 mEq Oral TID    tiotropium  2 puff Inhalation Daily    dilTIAZem  120 mg Oral Daily    sodium chloride flush  10 mL Intravenous 2 times per day    enoxaparin  40 mg Subcutaneous Daily    methylPREDNISolone  30 mg Intravenous Q12H    piperacillin-tazobactam  3.375 g Intravenous Q8H    pantoprazole  20 mg Oral QAM AC     PRN Meds: potassium chloride, hydrOXYzine, sodium chloride flush, acetaminophen **OR** acetaminophen, polyethylene glycol, promethazine **OR** ondansetron, HYDROmorphone, albuterol sulfate HFA, mineral oil-hydrophilic petrolatum      Intake/Output Summary (Last 24 hours) at 8/7/2020 0912  Last data filed at 8/7/2020 0908  Gross per 24 hour   Intake 1505 ml   Output 1000 ml   Net 505 ml       Physical Exam Performed:    /70   Pulse 88   Temp 98.1 °F (36.7 °C) (Oral)   Resp 18   Ht 6' 1\" (1.854 m)   Wt 160 lb 14.4 oz (73 kg)   SpO2 91%   BMI 21.23 kg/m²     General appearance:  No apparent distress, appears stated age and cooperative. HEENT:  Normal cephalic, atraumatic without obvious deformity. Pupils equal, round, and reactive to light.  Extra ocular muscles intact. Conjunctivae/corneas clear. Neck: Supple, with full range of motion. No jugular venous distention. Trachea midline. Respiratory:  Normal respiratory effort. Clear to auscultation, bilaterally without Rales/Wheezes/Rhonchi. Cardiovascular:  Regular rate and rhythm with normal S1/S2 without murmurs, rubs or gallops. Abdomen: Soft, mildly tender(just received iv pain meds), non-distended with dec'd bowel sounds.   Musculoskeletal:  No clubbing, cyanosis or edema bilaterally.  Full range of motion without deformity. Skin: Skin color, texture, turgor normal.  No rashes or lesions except diffuse erythema noted over entire body, small blistering noted on right hand, diffuse swelling/ansarca noted  Neurologic:  Neurovascularly intact without any focal sensory/motor deficits. Cranial nerves: II-XII intact, grossly non-focal.  Psychiatric:  Alert and oriented, thought content appropriate, normal insight  Capillary Refill: Brisk,< 3 seconds   Peripheral Pulses: +2 palpable, equal bilaterally       Labs:   Recent Labs     08/05/20  0600 08/06/20  0615 08/07/20  0525   WBC 9.7 7.8 9.9   HGB 10.0* 9.4* 9.1*   HCT 29.7* 28.0* 27.3*    145 137     Recent Labs     08/05/20  0600 08/05/20  1700 08/06/20  0615 08/07/20  0525     --  138 140   K 2.9* 4.2 4.1 4.1     --  102 108   CO2 28  --  28 23   BUN 18  --  14 10   CREATININE 0.8  --  0.8 0.7*   CALCIUM 6.3*  --  6.5* 5.8*     Recent Labs     08/04/20  0940   AST 11*   ALT 16   BILITOT 0.6   ALKPHOS 69     No results for input(s): INR in the last 72 hours. No results for input(s): Byron Milton in the last 72 hours.     Urinalysis:      Lab Results   Component Value Date    NITRU Negative 08/04/2020    WBCUA 0-2 08/04/2020    BACTERIA Rare 08/04/2020    RBCUA 3-4 08/04/2020    BLOODU Negative 08/04/2020    SPECGRAV 1.020 08/04/2020    GLUCOSEU Negative 08/04/2020    GLUCOSEU NEGATIVE 11/30/2011       Radiology:  CT ABDOMEN PELVIS WO CONTRAST Additional Contrast? Oral   Final Result   No extraluminal oral contrast to indicate a site of gastrointestinal   perforation         CT ABDOMEN PELVIS W IV CONTRAST Additional Contrast? None   Final Result   Addendum 1 of 1   ADDENDUM:   These results were communicated by telephone to Dr. Guadalupe Arzate at 11:06 a.m.    on   08/04/2020         Final      CT ABDOMEN PELVIS W IV CONTRAST    (Results Pending)           Assessment/Plan:    Active Hospital Problems    Diagnosis    Pneumoperitoneum [K66.8]   

## 2020-08-07 NOTE — PROGRESS NOTES
ONCOLOGY HEMATOLOGY CARE PROGRESS NOTE      SUBJECTIVE:     Feels weak with muscular twitching. Getting cleaned up now. No family in room. Tolerated diet yesterday. Getting a repeat CT today. ROS:   The remaining 10 point review of symptoms is unremarkable. OBJECTIVE        Physical    VITALS:  /70   Pulse 88   Temp 98.1 °F (36.7 °C) (Oral)   Resp 18   Ht 6' 1\" (1.854 m)   Wt 160 lb 14.4 oz (73 kg)   SpO2 91%   BMI 21.23 kg/m²   TEMPERATURE:  Current - Temp: 98.1 °F (36.7 °C); Max - Temp  Av.2 °F (36.8 °C)  Min: 97.7 °F (36.5 °C)  Max: 98.4 °F (36.9 °C)  PULSE OXIMETRY RANGE: SpO2  Av.4 %  Min: 91 %  Max: 97 %  24HR INTAKE/OUTPUT:      Intake/Output Summary (Last 24 hours) at 2020 9673  Last data filed at 2020 0908  Gross per 24 hour   Intake 1505 ml   Output 1000 ml   Net 505 ml       CONSTITUTIONAL:  awake, alert, cooperative, no apparent distress, HEENT oral pharynx , no scleral icterus  HEMATOLOGIC/LYMPHATICS:  no cervical lymphadenopathy, no supraclavicular lymphadenopathy, no axillary lymphadenopathy and no inguinal lymphadenopathy  LUNGS:  No increased work of breathing, good air exchange, clear to auscultation bilaterally, no crackles or wheezing  CARDIOVASCULAR:  , regular rate and rhythm, normal S1 and S2, no S3 or S4, and no murmur noted  ABDOMEN:  No scars, normal bowel sounds, soft, non-distended, non-tender, no masses palpated, no hepatosplenomegally  MUSCULOSKELETAL:  There is no redness, warmth, or swelling of the joints. EXTREMETIES: widespread anasarca, LUE worse than RUE   NEUROLOGIC:  Awake, alert, oriented to name, place and time. Cranial nerves II-XII are grossly intact. Motor is 5 out of 5 bilaterally.    SKIN:  no bruising or bleeding      Data      Recent Labs     20  0600 20  0615 20  0525   WBC 9.7 7.8 9.9   HGB 10.0* 9.4* 9.1*   HCT 29.7* 28.0* 27.3*    145 137   MCV 90.6 91.3 90.9 Zosyn    - repeat CT today      4.) Anasarca , chronic - venous dopplers negative   - Had been managing with Torsemide as an outpatient, as it seemed more effective than PO Lasix due to its greater oral bioavailablilty and longer half-life. - This issue is on hold for now.     5.) Atrial fibrillation     6.) COPD    7) Lytes  - Ca is 5.8 - ordered 2 grams calcium gluconate, repeat Mag levels      Thank you for asking me to see the patient.

## 2020-08-07 NOTE — PROGRESS NOTES
[facility]    PATIENT NAME: Moni Emery     TODAY'S DATE: 8/7/2020    CHIEF COMPLAINT: LLQ pain    INTERVAL HISTORY/HPI:    Pt states he feels somewhat better overall, but still c/o focal LLQ pain. No N/V, no BM but (+) flatus. OBJECTIVE:  VITALS:  /70   Pulse 88   Temp 98.1 °F (36.7 °C) (Oral)   Resp 16   Ht 6' 1\" (1.854 m)   Wt 160 lb 14.4 oz (73 kg)   SpO2 91%   BMI 21.23 kg/m²     INTAKE/OUTPUT:    I/O last 3 completed shifts: In: 1276 [P.O.:1255; IV Piggyback:250]  Out: 955 [Urine:955]  No intake/output data recorded. CONSTITUTIONAL:  awake and alert  LUNGS:     ABDOMEN:  hypoactive bowel sounds, soft, non-distended, tenderness noted in the left lower quadrant with guarding    Data:  CBC:   Recent Labs     08/05/20  0600 08/06/20  0615 08/07/20  0525   WBC 9.7 7.8 9.9   HGB 10.0* 9.4* 9.1*   HCT 29.7* 28.0* 27.3*    145 137     BMP:    Recent Labs     08/05/20  0600 08/05/20  1700 08/06/20  0615 08/07/20  0525     --  138 140   K 2.9* 4.2 4.1 4.1     --  102 108   CO2 28  --  28 23   BUN 18  --  14 10   CREATININE 0.8  --  0.8 0.7*   GLUCOSE 63*  --  177* 150*     Hepatic:   Recent Labs     08/04/20  0940   AST 11*   ALT 16   BILITOT 0.6   ALKPHOS 69     Mag:      Recent Labs     08/05/20  0600   MG 1.20*      Phos:   No results for input(s): PHOS in the last 72 hours. INR: No results for input(s): INR in the last 72 hours. Radiology Review:         ASSESSMENT AND PLAN:  76 y/o WM admitted w/ abdominal pain, pneumoperitoneum of uncertain etiology, although suspect L colonic source (ie diverticulitis).   Stable/slowly improving with current conservative care   - repeat CT w/ PO/IV contrast today   - cont IV abx   - further recs to follow based on exam, CT findings    Electronically signed by Dylan Lerner MD

## 2020-08-07 NOTE — PROGRESS NOTES
Assessment complete. VSS. Patient comfortable, nonslip socks on, bed in lowest position. Pt educated to call out for assistance.

## 2020-08-07 NOTE — PROGRESS NOTES
CT results reviewed with Radiology and discussed with patient. Parasigmoid findings consistent with known mass from October of last year that has decreased in size with treatment of his lymphoma. Degree of pneumoperitoneum and location, along with prednisone use and increase in dosage, concerning for ulcer as cause. As without peritonitis, contrast extravasation or hemodynamic instability will continue to see if we can avoid an operation. Will keep npo, start protonix drip and get UGI on Monday to reassess. If he were to worsen despite this then operative intervention would be needed. Discussed plan with patient and wife.     Walter Owens MD

## 2020-08-07 NOTE — FLOWSHEET NOTE
08/07/20 1025   Encounter Summary   Services provided to: Patient   Referral/Consult From: Rounding   Support System Spouse; Children   Place of [place] non-practicing Confucianism   Continue Visiting   (8/7 Tel.-pt.passive;no spiritual needs;blessing)   Complexity of Encounter Moderate   Length of Encounter 15 minutes   Spiritual Assessment Completed Yes

## 2020-08-07 NOTE — PROGRESS NOTES
VSS - afebrile. Pt is alert and oriented x 4 with history of falls. Assessment completed as charted. Bed is in lowest position with 3/4 bed rails raised, wheels locked and call light within reach - patient wearing non-skid socks and verbalizes understanding to call out for assistance. Pain medication given per MAR. No further requests at this time. Will continue to monitor.    Vitals:    08/06/20 2030   BP: 138/63   Pulse: 87   Resp: 16   Temp: 98.3 °F (36.8 °C)   SpO2: 94%

## 2020-08-08 NOTE — PROGRESS NOTES
Occupational Therapy   Occupational Therapy Initial Assessment and Treatment  Date: 2020   Patient Name: Cielo Mcelroy  MRN: [id number]     : 1945    Date of Service: 2020    Discharge Recommendations:  Home with Home health OT, 24 hour supervision or assist     Assessment   Performance deficits / Impairments: Decreased functional mobility ; Decreased endurance;Decreased ADL status; Decreased balance;Decreased high-level IADLs;Decreased safe awareness  Assessment: Pt agreeable to therapy eval. Pt currently presenting with the above performance deficits and functioning below baseline. Pt independent PTA and currently needing Mod A for LB ADLs, and Min-CGA for functional mobility/transfers with RW. Pt will benefit from continued skilled OT while in house. Prognosis: Fair  Decision Making: Medium Complexity  OT Education: OT Role;Orientation;Plan of Care;Home Exercise Program;ADL Adaptive Strategies;Transfer Training;Energy Conservation  Patient Education: disease specific ed: energy conservation, positioning, ther ex for swelling  REQUIRES OT FOLLOW UP: Yes  Activity Tolerance  Activity Tolerance: Patient Tolerated treatment well;Patient limited by fatigue  Activity Tolerance: pt reported feeling dizzy with activity; vitals stable  Safety Devices  Safety Devices in place: Yes  Type of devices: Bed alarm in place;Call light within reach;Nurse notified; Left in bed;Gait belt         Patient Diagnosis(es): The primary encounter diagnosis was Abdominal pain, left lower quadrant. A diagnosis of Pneumoperitoneum was also pertinent to this visit.      has a past medical history of Blood transfusion, Cancer ([address] COPD, mild ([address] Dermatitis, Disease of blood and blood forming organ, History of blood transfusion, Medical history reviewed with no changes, Non Hodgkin's lymphoma ([address] Pneumonia, Pneumothorax, and Spontaneous pneumothorax.   has a past surgical history that includes Femur Surgery; Middle ear surgery; chest tube insertion; lymph node biopsy (12/11/12); Stomach surgery; Skin cancer destruction; Colonoscopy (4/9); Colonoscopy (5/15/13); Tunneled venous port placement; fracture surgery (1960); fracture surgery (2003); other surgical history (6/7/12); skin biopsy; and Thoracoscopy (Left, 01/08/2018). Restrictions  Restrictions/Precautions  Restrictions/Precautions: General Precautions  Position Activity Restriction  Other position/activity restrictions: IV, telemetry    Subjective   General  Chart Reviewed: Yes  Patient assessed for rehabilitation services?: Yes  Referring Practitioner: Al Christensen MD  Diagnosis: pneumoperitoneum  Subjective  Subjective: Resting in bed on OT arrival, agreeable to eval  General Comment  Comments: Per RN ok for therapy  Patient Currently in Pain: Yes  Pain Assessment  Pain Assessment: 0-10  Pain Level: 2  Pain Type: Acute pain  Pain Location: Abdomen  Non-Pharmaceutical Pain Intervention(s): Ambulation/Increased Activity; Environmental changes;Relaxation techniques; Emotional support; Therapeutic presence;Repositioned  Response to Pain Intervention: Patient Satisfied  Vital Signs  Temp: 98.7 °F (37.1 °C)  Temp Source: Axillary  Pulse: 97  Heart Rate Source: Monitor  Resp: 16  BP: 118/72  BP Location: Right upper arm  BP Upper/Lower: Upper  Patient Currently in Pain: Yes  Oxygen Therapy  SpO2: 96 %  O2 Device: None (Room air)  Social/Functional History  Social/Functional History  Lives With: Spouse(retired can assist 24/7 if needed)  Type of Home: House  Home Layout: Two level, Bed/Bath upstairs, 1/2 bath on main level(couch on 1st floor)  Home Access: Stairs to enter with rails  Entrance Stairs - Number of Steps: 1  Entrance Stairs - Rails: Right  Bathroom Toilet: Handicap height  Home Equipment: Standard walker  ADL Assistance: Independent  Homemaking Assistance: Independent  Homemaking Responsibilities: Yes  Ambulation Assistance: Independent(independent with household distances)  Transfer Assistance: Independent  Active : Yes  Occupation: Retired  Type of occupation:   Leisure & Hobbies: yardwork     Objective   Vision: Impaired  Vision Exceptions: Wears glasses at all times  Hearing: Within functional limits    Orientation  Overall Orientation Status: Within Functional Limits     Balance  Sitting Balance: Stand by assistance  Standing Balance: Minimal assistance(Min no device; CGA with RW)  Standing Balance  Time: ~2 min, ~2 min  Activity: functional mobility in room and bathroom, standing ADLs  Comment: RW for longer mobility with increased stability  Functional Mobility  Functional - Mobility Device: Rolling Walker  Activity: To/from bathroom; Other  Assist Level: Minimal assistance  Functional Mobility Comments: Min no device; CGA with RW  ADL  Grooming: Stand by assistance(stance at sink to wash face and hands)  LE Dressing:  Moderate assistance(socks simulated)  Toileting: Stand by assistance(stance at toilet to urinate)  Tone RUE  RUE Tone: Normotonic  Tone LUE  LUE Tone: Normotonic  Coordination  Movements Are Fluid And Coordinated: Yes     Bed mobility  Supine to Sit: Stand by assistance(HOB elevated, moving toward R side)  Sit to Supine: Stand by assistance  Scooting: Stand by assistance(toward HOB while supine)  Transfers  Sit to stand: Contact guard assistance;Minimal assistance  Stand to sit: Contact guard assistance;Minimal assistance  Transfer Comments: Min no device; CGA with RW     Cognition  Overall Cognitive Status: WFL     Sensation  Overall Sensation Status: WFL  Type of ROM/Therapeutic Exercise  Type of ROM/Therapeutic Exercise: AROM  Comment: BUE AROM ther ex  Exercises  Shoulder Flexion: 5x  Elbow Flexion: 5x  Elbow Extension: 5x  Wrist Flexion: 5x  Wrist Extension: 5x  Grasp/Release: 5x     LUE AROM (degrees)  LUE AROM : WFL  Left Hand AROM (degrees)  Left Hand AROM: WFL  RUE AROM (degrees)  RUE AROM : WFL  Right Hand AROM (degrees)  Right Hand AROM: WFL  LUE Strength  Gross LUE Strength: WFL  RUE Strength  Gross RUE Strength: WFL      Plan   Plan  Times per week: 3-5x/wk  Current Treatment Recommendations: Strengthening, Patient/Caregiver Education & Training, ROM, Equipment Evaluation, Education, & procurement, Balance Training, Functional Mobility Training, Positioning, Endurance Training, Safety Education & Training, Self-Care / ADL    AM-PAC Score     AM-PAC Inpatient Daily Activity Raw Score: 17 (08/08/20 1250)  AM-PAC Inpatient ADL T-Scale Score : 37.26 (08/08/20 1250)  ADL Inpatient CMS 0-100% Score: 50.11 (08/08/20 1250)  ADL Inpatient CMS G-Code Modifier : CK (08/08/20 1250)    Goals  Short term goals  Time Frame for Short term goals: within one week (8/15)  Short term goal 1: Pt demonstrates LB dressing with AE prn and LRAD (SBA)  Short term goal 2: Pt demonstrates BUE AROM there ex x10-15 reps by 8/10  Short term goal 3: Pt demonstrates x5 min standing dynamic balance activity using LRAD (SBA)  Patient Goals   Patient goals : \"eat something\"     Therapy Time   Individual Concurrent Group Co-treatment   Time In 5850         Time Out 1217         Minutes 34         Timed Code Treatment Minutes: 24 Minutes(10 min eval)       SHERMAN Bloom/L    If pt is unable to be seen after this session, please let this note serve as discharge summary. Please see case management note for discharge disposition. Thank you.

## 2020-08-08 NOTE — PROGRESS NOTES
Hospitalist Progress Note      PCP: Kylee Delvalle MD    Date of Admission: 8/4/2020    Chief Complaint: abd pain     Hospital Course:       Subjective:  ongoing intermittent pain seems better, tolerating clears(wanting popsicle), wife at bedside      Medications:  Reviewed    Infusion Medications    pantoprozole (PROTONIX) infusion 8 mg/hr (08/08/20 0522)     Scheduled Medications    [START ON 8/10/2020] pantoprazole  40 mg Intravenous Daily    And    [START ON 8/10/2020] sodium chloride (PF)  10 mL Intravenous Daily    piperacillin-tazobactam  3.375 g Intravenous Q6H    potassium chloride  10 mEq Oral TID    tiotropium  2 puff Inhalation Daily    dilTIAZem  120 mg Oral Daily    sodium chloride flush  10 mL Intravenous 2 times per day    enoxaparin  40 mg Subcutaneous Daily    methylPREDNISolone  30 mg Intravenous Q12H     PRN Meds: potassium chloride, hydrOXYzine, sodium chloride flush, acetaminophen **OR** acetaminophen, polyethylene glycol, promethazine **OR** ondansetron, HYDROmorphone, albuterol sulfate HFA, mineral oil-hydrophilic petrolatum      Intake/Output Summary (Last 24 hours) at 8/8/2020 1023  Last data filed at 8/8/2020 0801  Gross per 24 hour   Intake 771 ml   Output 2025 ml   Net -1254 ml       Physical Exam Performed:    /74   Pulse 74   Temp 97.5 °F (36.4 °C) (Oral)   Resp 18   Ht 6' 1\" (1.854 m)   Wt 163 lb 2.3 oz (74 kg)   SpO2 94%   BMI 21.52 kg/m²     General appearance:  No apparent distress, appears stated age and cooperative. HEENT:  Normal cephalic, atraumatic without obvious deformity. Pupils equal, round, and reactive to light.  Extra ocular muscles intact. Conjunctivae/corneas clear. Neck: Supple, with full range of motion. No jugular venous distention. Trachea midline. Respiratory:  Normal respiratory effort. Clear to auscultation, bilaterally without Rales/Wheezes/Rhonchi.   Cardiovascular:  Regular rate and rhythm with normal S1/S2 without murmurs, rubs or gallops. Abdomen: Soft, mildly tender(just received iv pain meds), non-distended with dec'd bowel sounds. Musculoskeletal:  No clubbing, cyanosis or edema bilaterally.  Full range of motion without deformity. Skin: Skin color, texture, turgor normal.  No rashes or lesions except diffuse erythema noted over entire body, small blistering noted on right hand, diffuse swelling/ansarca noted  Neurologic:  Neurovascularly intact without any focal sensory/motor deficits. Cranial nerves: II-XII intact, grossly non-focal.  Psychiatric:  Alert and oriented, thought content appropriate, normal insight  Capillary Refill: Brisk,< 3 seconds   Peripheral Pulses: +2 palpable, equal bilaterally       Labs:   Recent Labs     08/06/20 0615 08/07/20 0525 08/08/20 0453   WBC 7.8 9.9 8.0   HGB 9.4* 9.1* 10.3*   HCT 28.0* 27.3* 30.9*    137 133*     Recent Labs     08/06/20 0615 08/07/20 0525 08/08/20 0453    140 136   K 4.1 4.1 5.0    108 104   CO2 28 23 24   BUN 14 10 10   CREATININE 0.8 0.7* 0.7*   CALCIUM 6.5* 5.8* 7.0*     No results for input(s): AST, ALT, BILIDIR, BILITOT, ALKPHOS in the last 72 hours. No results for input(s): INR in the last 72 hours. No results for input(s): Millicent Ke in the last 72 hours. Urinalysis:      Lab Results   Component Value Date    NITRU Negative 08/04/2020    WBCUA 0-2 08/04/2020    BACTERIA Rare 08/04/2020    RBCUA 3-4 08/04/2020    BLOODU Negative 08/04/2020    SPECGRAV 1.020 08/04/2020    GLUCOSEU Negative 08/04/2020    GLUCOSEU NEGATIVE 11/30/2011       Radiology:  CT ABDOMEN PELVIS W IV CONTRAST   Final Result   Moderate amount of pneumoperitoneum, similar to prior. There is a small   linear tract of gas marginating the anterior pyloric region of the stomach   anteriorly, which could suggest site of perforation.       Focal collection of fluid and gas marginates the sigmoid colon in the pelvis,   suggesting early pericolonic abscess from perforated diverticulitis. There   is wall thickening of the sigmoid colon in the pelvis with surrounding   inflammatory change. Given the lack of significant free air in the pelvis,   this is not likely to be the source of the perforation. Persistent small bowel wall thickening in the pelvis, likely reactive due to   the underlying pelvic ascites. CT ABDOMEN PELVIS WO CONTRAST Additional Contrast? Oral   Final Result   No extraluminal oral contrast to indicate a site of gastrointestinal   perforation         CT ABDOMEN PELVIS W IV CONTRAST Additional Contrast? None   Final Result   Addendum 1 of 1   ADDENDUM:   These results were communicated by telephone to Dr. Osman Jacques at 11:06 a.m.    on   08/04/2020         Final              Assessment/Plan:    Active Hospital Problems    Diagnosis    Pneumoperitoneum [K66.8]    Abdominal pain, left lower quadrant [R10.32]         abd pain- with Pneumoperitoneum noted on CT  -gen surg consulted, will atempt to tx nonoperatively  -Tele  -Npo except meds/ice chips initially, advanced as tolerated  -Prn iv dilaudid ordered  IV zosyn started 8/4 and continued  -Gentle ivfs ordered   -repeat CT 8/7 noted moderate pneumoperitoneum(similar to prior, ?stomach as site of perforation, ?early pericolonic abscess)     Normocytic Anemia-stable  -monitored labs     Anasarca- due to steroids?   -held diuretics for now given above     afib- controlled, unclear why not on AC  -continued on dilt     Anxiety -on atarax prn     Asthma- per emr  On incruse(sub spiriva here) and albuterol     Drug rash -ongoing, attempted to wean as outpt but did not tolerate, related to recent chemo for NHL  -Switched po steroids to iv soluemdrol q12, mgmt per hemonc     [employer]- mgt per [facility]mon[facility]  -held home megace     DVT Prophylaxis: lovenox sq  Diet: Diet NPO, After Midnight Exceptions are: Sips with Meds, Ice Chips  Code Status: Full Code    PT/OT Eval Status: not ordered    Dispo - pending surg workup,     Dano Virk MD

## 2020-08-08 NOTE — PROGRESS NOTES
[facility] SURGERY    PATIENT NAME: Pedro Alarcon     TODAY'S DATE: 8/8/2020    CHIEF COMPLAINT: pain    INTERVAL HISTORY/HPI:    Pt with less abd pain, no nausea. No fevers or chills. REVIEW OF SYSTEMS:  Pertinent positives and negatives as per interval history section    OBJECTIVE:  VITALS:  /72   Pulse 97   Temp 98.7 °F (37.1 °C) (Axillary)   Resp 16   Ht 6' 1\" (1.854 m)   Wt 163 lb 2.3 oz (74 kg)   SpO2 96%   BMI 21.52 kg/m²     INTAKE/OUTPUT:    I/O last 3 completed shifts: In: 022 [P.O.:100; I.V.:237; IV Piggyback:434]  Out: 2000 [Urine:2000]  I/O this shift:  In: -   Out: 675 [Urine:675]    CONSTITUTIONAL:  awake and alert  LUNGS:  Respirations easy and unlabored, no crackles or wheezing  CARD:  regular rate and rhythm  ABDOMEN:  normal bowel sounds, soft, non-distended, less pain     Data:  CBC:   Recent Labs     08/06/20  0615 08/07/20  0525 08/08/20  0453   WBC 7.8 9.9 8.0   HGB 9.4* 9.1* 10.3*   HCT 28.0* 27.3* 30.9*    137 133*     BMP:    Recent Labs     08/06/20  0615 08/07/20  0525 08/08/20  0453    140 136   K 4.1 4.1 5.0    108 104   CO2 28 23 24   BUN 14 10 10   CREATININE 0.8 0.7* 0.7*   GLUCOSE 177* 150* 107*     Hepatic: No results for input(s): AST, ALT, ALB, BILITOT, ALKPHOS in the last 72 hours. Mag:      Recent Labs     08/07/20  0525 08/08/20  0453   MG 1.60* 1.90      Phos:   No results for input(s): PHOS in the last 72 hours. INR: No results for input(s): INR in the last 72 hours. Radiology Review:  *Imaging personally reviewed by me. NA      ASSESSMENT AND PLAN:  78 yo with pneumoperitoneum  1. Continue NPO, ok for popsicles  2. IV abx, protonix  3.   UGI monday     Electronically signed by Natacha Rodas, [address] [place] [address]

## 2020-08-08 NOTE — PROGRESS NOTES
ONCOLOGY HEMATOLOGY CARE PROGRESS NOTE      SUBJECTIVE:     Afebrile. Had CT scan yesterday. Frustrated about being NPO.      ROS:   The remaining 10 point review of symptoms is unremarkable. OBJECTIVE        Physical    VITALS:  /74   Pulse 74   Temp 97.5 °F (36.4 °C) (Oral)   Resp 18   Ht 6' 1\" (1.854 m)   Wt 163 lb 2.3 oz (74 kg)   SpO2 94%   BMI 21.52 kg/m²   TEMPERATURE:  Current - Temp: 97.5 °F (36.4 °C); Max - Temp  Av.9 °F (36.6 °C)  Min: 97.3 °F (36.3 °C)  Max: 98.6 °F (37 °C)  PULSE OXIMETRY RANGE: SpO2  Av.6 %  Min: 93 %  Max: 97 %  24HR INTAKE/OUTPUT:      Intake/Output Summary (Last 24 hours) at 2020 0846  Last data filed at 2020 0801  Gross per 24 hour   Intake 771 ml   Output 2250 ml   Net -1479 ml       CONSTITUTIONAL:  awake, alert, cooperative, no apparent distress, HEENT oral pharynx , no scleral icterus  HEMATOLOGIC/LYMPHATICS:  no cervical lymphadenopathy, no supraclavicular lymphadenopathy, no axillary lymphadenopathy and no inguinal lymphadenopathy  LUNGS:  No increased work of breathing, good air exchange, clear to auscultation bilaterally, no crackles or wheezing  CARDIOVASCULAR:  , regular rate and rhythm, normal S1 and S2, no S3 or S4, and no murmur noted  ABDOMEN:  No scars, normal bowel sounds, soft, non-distended, non-tender, no masses palpated, no hepatosplenomegally  MUSCULOSKELETAL:  There is no redness, warmth, or swelling of the joints. EXTREMETIES: widespread anasarca, LUE worse than RUE   NEUROLOGIC:  Awake, alert, oriented to name, place and time. Cranial nerves II-XII are grossly intact. Motor is 5 out of 5 bilaterally.    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 - We have been treating with Prednisone (Methylpred as an inpatient), foamy cleanser in the AM then an emollient (Aquaphor). - Because the dermatitis had initially improved, but then was beginning to flare again, I had feared that the dermatitis was a consequence of untreated NHL.   This was part of the motivation for proceeding on with 4th-line therapy      3.) Bowel perforation  - Presently treating conservatively with IVF, IV antibitoics, NPO.  - surgery following  - CT results noted 8/7/2020  - surgery planning UGI on Monday, plan surgery if he worsens      4.) Anasarca , chronic - venous dopplers negative   - Had been managing with Torsemide as an outpatient  - This issue is on hold for now.     5.) Atrial fibrillation     6.) COPD    7) Lytes  - Mg normal  - Ca improved      Anny Quinn MD

## 2020-08-08 NOTE — PLAN OF CARE
Problem: Falls - Risk of:  Goal: Will remain free from falls  Description: Will remain free from falls  Outcome: Ongoing   Fall precautions in place, bed alarm on, nonskid foot wear applied, bed in lowest position, and call light within reach. Will continue to monitor. Problem: Pain:  Goal: Control of acute pain  Description: Control of acute pain  Outcome: Ongoing   Pt has dilaudid for pain control. Call light in reach. Pain control satisfactory. Problem: Skin Integrity:  Goal: Absence of new skin breakdown  Description: Absence of new skin breakdown  Outcome: Ongoing   Pt has redness and peeling secondary due to dermatitis, using Aquaphor. Skin intact.

## 2020-08-08 NOTE — PROGRESS NOTES
Ace wraps applied to bilateral lower extremities for pitting edema. Patient given popsicles per order change. Wife at bedside. Denies further needs at this time.

## 2020-08-08 NOTE — PROGRESS NOTES
pressure relief. Educated on importance of utilizing RW to help decrease his fall risk. Patient verbalized understanding. Barriers to Learning: none  REQUIRES PT FOLLOW UP: Yes  Activity Tolerance  Activity Tolerance: Patient limited by fatigue;Patient limited by pain; Patient limited by endurance  Activity Tolerance: Vitals: 96% room air. 82 /69       Patient Diagnosis(es): The primary encounter diagnosis was Abdominal pain, left lower quadrant. A diagnosis of Pneumoperitoneum was also pertinent to this visit. has a past medical history of Blood transfusion, Cancer ([address] COPD, mild ([address] Dermatitis, Disease of blood and blood forming organ, History of blood transfusion, Medical history reviewed with no changes, Non Hodgkin's lymphoma ([address] Pneumonia, Pneumothorax, and Spontaneous pneumothorax.   has a past surgical history that includes Femur Surgery; Middle ear surgery; chest tube insertion; lymph node biopsy (12/11/12); Stomach surgery; Skin cancer destruction; Colonoscopy (4/9); Colonoscopy (5/15/13); Tunneled venous port placement; fracture surgery (1960); fracture surgery (2003); other surgical history (6/7/12); skin biopsy; and Thoracoscopy (Left, 01/08/2018). Restrictions  Restrictions/Precautions  Restrictions/Precautions: General Precautions  Position Activity Restriction  Other position/activity restrictions: IV, telemetry  Vision/Hearing  Vision: Impaired  Vision Exceptions: Wears glasses at all times  Hearing: Within functional limits     Subjective  General  Chart Reviewed: Yes  Patient assessed for rehabilitation services?: Yes  Response To Previous Treatment: Not applicable  Family / Caregiver Present: No  Referring Practitioner: Jenelle Lopez MD  Referral Date : 08/08/20  Follows Commands: Within Functional Limits  General Comment  Comments: Patient in supine position in the bed upon entry of therapy staff.   Subjective  Subjective: Patient agreed to participate in therapy. Pain Screening  Patient Currently in Pain: Yes  Pain Assessment  Pain Assessment: 0-10  Pain Level: 2  Pain Type: Acute pain  Pain Location: Abdomen  Response to Pain Intervention: Patient Satisfied  Vital Signs  Patient Currently in Pain: Yes  Pre Treatment Pain Screening  Intervention List: Patient able to continue with treatment    Orientation  Orientation  Overall Orientation Status: Within Normal Limits  Social/Functional History  Social/Functional History  Lives With: Spouse(retired can assist 24/7 if needed)  Type of Home: House  Home Layout: Two level, Bed/Bath upstairs, 1/2 bath on main level(couch on 1st floor)  Home Access: Stairs to enter with rails  Entrance Stairs - Number of Steps: 1  Entrance Stairs - Rails: Right  Bathroom Toilet: Handicap height  Home Equipment: Standard walker  ADL Assistance: Independent  Homemaking Assistance: Independent  Homemaking Responsibilities: Yes  Ambulation Assistance: Independent(independent with household distances)  Transfer Assistance: Independent  Active : Yes  Occupation: Retired  Type of occupation:   Leisure & Hobbies: yardwork  Cognition   Cognition  Overall Cognitive Status: WFL    Objective     Observation/Palpation  Posture: Fair(kyphotic posture)  Observation: red color noticed throughout patient's body. patient said that this is his body's reaction to his cancer. Edema: in bilateral LE. patient's RN wrapped patient's legs with bandages to help control swelling. PROM RLE (degrees)  RLE PROM: WFL  AROM RLE (degrees)  RLE AROM: WFL  PROM LLE (degrees)  LLE PROM: WFL  AROM LLE (degrees)  LLE AROM : WFL  Strength RLE  Comment: grossly 4/5 strength bilaterally. Strength LLE  Comment: grossly 4/5 strength bilaterally.   Motor Control  Gross Motor?: Exceptions  Comments: grossly 4/5 strength bilaterally  Coordination  Finger to Nose: Normal  Heel to Shin: Normal  Sensation  Overall Sensation Status: WFL  Bed mobility  Supine to Sit: Stand by assistance(head of bed elevated, to the right)  Sit to Supine: Stand by assistance  Scooting: Stand by assistance(toward head of bed while supine)  Comment: patient's assisted with changing soiled gown in bed. Transfers  Sit to Stand: Contact guard assistance  Stand to sit: Contact guard assistance  Bed to Chair: Contact guard assistance;Minimal assistance  Comment: CGA with RW. min assist with no assistive device. poor eccentric control of hip extensors. cueing for hand placement. Ambulation  Ambulation?: Yes  More Ambulation?: Yes  Ambulation 1  Surface: level tile  Device: Hand-Held Assist;No Device  Assistance: Minimal assistance  Quality of Gait: unsteady gait pattern. 1 loss of balance. min assist to correct. patient reaching for walls/various objects in order to help correct his balance. narrow base of support/scissor gait at times. cueing to correct. cueing to correct patient's kyphotic posture. Gait Deviations: Slow Catarina;Decreased step length;Decreased step height;Decreased arm swing  Distance: 10 feet x 2. Comments: No complaints of shortness of breath, chest pain or dizziness. 1 loss of balance. min assist to correct. Ambulation 2  Surface - 2: level tile  Device 2: Rolling Walker  Assistance 2: Contact guard assistance  Quality of Gait 2: patient's balance significantly improved with use of rolling walker. no over loss of balance. however patient continued to present with kyphotic posture and narrow base of support/scissor gait at times. increased time needed in order to turn with rolling walker. cueing for safe RW placement. Gait Deviations: Slow Catarina;Decreased step length;Decreased step height  Distance: 30 feet. Comments: No complaints of shortness of breath, chest pain or dizziness. No overt loss of balance.   Stairs/Curb  Stairs?: No     Balance  Posture: Good  Sitting - Static: Good  Sitting - Dynamic: Good  Standing - Static: Fair  Standing - Dynamic: Fair  Comments: fair with RW. poor + with no assistive device. Exercises  Straight Leg Raise: 1 x 10  Heelslides: 1 x 10  Hip Flexion: 1 x 10  Hip Abduction: 1 x 10  Knee Long Arc Quad: 1 x 10  Ankle Pumps: 1 x 10  Comments: cueing for sequencing and technique. increased time needed between sets due to fatigue. Other exercises  Other exercises?: No     Plan   Plan  Times per week: 3-5/week  Plan weeks: 1 week 8/15/20  Specific instructions for Next Treatment: progress mobility as tolerated  Current Treatment Recommendations: Strengthening, Transfer Training, Endurance Training, Patient/Caregiver Education & Training, Balance Training, Gait Training, Home Exercise Program, Safety Education & Training, ADL/Self-care Training, Pain Management, Equipment Evaluation, Education, & procurement, Positioning, Stair training, Functional Mobility Training  Safety Devices  Type of devices: All fall risk precautions in place, Gait belt, Bed alarm in place, Patient at risk for falls, Left in bed, Nurse notified, Call light within reach    AM-PAC Score     AM-PAC Inpatient Mobility without Stair Climbing Raw Score : 15 (08/08/20 1326)  AM-PAC Inpatient without Stair Climbing T-Scale Score : 43.03 (08/08/20 1326)  Mobility Inpatient CMS 0-100% Score: 47.43 (08/08/20 1326)  Mobility Inpatient without Stair CMS G-Code Modifier : CK (08/08/20 1326)       Goals  Short term goals  Time Frame for Short term goals: 1 week 8/15/20  Short term goal 1: Supine <> sit with mod I. Short term goal 2: Sit <> stand with supervision. Short term goal 3: Bed <> chair with supervision. Short term goal 4: Ambulate 150 feet with LRAD and supervision. Short term goal 5: Ascend 4 steps with rail and supervision. Patient Goals   Patient goals : To feel better. To be stronger and walk farther.        Therapy Time   Individual Concurrent Group Co-treatment   Time In 1143         Time Out 1217         Minutes 34         Timed Code Treatment Minutes: 24 Minutes(10 minute

## 2020-08-09 NOTE — PROGRESS NOTES
ONCOLOGY HEMATOLOGY CARE PROGRESS NOTE      SUBJECTIVE:     Afebrile. Happy to have popsicles yesterday. UGI planned tomorrow. ROS:   The remaining 10 point review of symptoms is unremarkable. OBJECTIVE        Physical    VITALS:  /79   Pulse 90   Temp 98 °F (36.7 °C) (Oral)   Resp 18   Ht 6' 1\" (1.854 m)   Wt 151 lb 7.3 oz (68.7 kg)   SpO2 97%   BMI 19.98 kg/m²   TEMPERATURE:  Current - Temp: 98 °F (36.7 °C); Max - Temp  Av.2 °F (36.8 °C)  Min: 98 °F (36.7 °C)  Max: 98.7 °F (37.1 °C)  PULSE OXIMETRY RANGE: SpO2  Av.3 %  Min: 96 %  Max: 97 %  24HR INTAKE/OUTPUT:      Intake/Output Summary (Last 24 hours) at 2020 1032  Last data filed at 2020 0825  Gross per 24 hour   Intake 602 ml   Output 2225 ml   Net -1623 ml       CONSTITUTIONAL:  awake, alert, cooperative, no apparent distress, HEENT oral pharynx , no scleral icterus  HEMATOLOGIC/LYMPHATICS:  no cervical lymphadenopathy, no supraclavicular lymphadenopathy, no axillary lymphadenopathy and no inguinal lymphadenopathy  LUNGS:  No increased work of breathing, good air exchange, clear to auscultation bilaterally, no crackles or wheezing  CARDIOVASCULAR:  , regular rate and rhythm, normal S1 and S2, no S3 or S4, and no murmur noted  ABDOMEN:  No scars, normal bowel sounds, soft, non-distended, non-tender, no masses palpated, no hepatosplenomegally  MUSCULOSKELETAL:  There is no redness, warmth, or swelling of the joints. EXTREMETIES: widespread anasarca, LUE worse than RUE   NEUROLOGIC:  Awake, alert, oriented to name, place and time. Cranial nerves II-XII are grossly intact. Motor is 5 out of 5 bilaterally.    SKIN:  Generalized erythema      Data      Recent Labs     20  0525 20  0453 20  0425   WBC 9.9 8.0 8.2   HGB 9.1* 10.3* 10.3*   HCT 27.3* 30.9* 30.7*    133* 133*   MCV 90.9 90.9 89.9        Recent Labs     20  0525 20  0453 20  0425   NA 140 136 127*   K 4.1 5.0 4.6    104 98*   CO2 23 24 24   BUN 10 10 9   CREATININE 0.7* 0.7* 0.7*     No results for input(s): AST, ALT, ALB, BILIDIR, BILITOT, ALKPHOS in the last 72 hours. Magnesium:    Lab Results   Component Value Date    MG 1.90 08/08/2020    MG 1.60 08/07/2020    MG 1.20 08/05/2020     CT A/P 8/7/2020: Moderate amount of pneumoperitoneum, similar to prior. [place] is a small    linear tract of gas marginating the anterior pyloric region of the stomach    anteriorly, which could suggest site of perforation.         Focal collection of fluid and gas marginates the sigmoid colon in the pelvis,    suggesting early pericolonic abscess from perforated diverticulitis.  There    is wall thickening of the sigmoid colon in the pelvis with surrounding    inflammatory change.  Given the lack of significant free air in the pelvis,    this is not likely to be the source of the perforation.         Persistent small bowel wall thickening in the pelvis, likely reactive due to    the underlying pelvic ascites. 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  - Recently status post treatment with 3rd-line [id number] in the context of a clinical trial between 10/30/2019 - 7/14/2020. - A recent CT scan on 7/31/2020 had shown slight progression.   The plan had been for R-Bendamustine to start (8/05/2020) but we will need to hold chemo.     2.) Exfoliative dermatitis  - Study drug was discontinued due to this adverse event. - We have been treating with Prednisone (Methylpred as an inpatient), foamy cleanser in the AM then an emollient (Aquaphor). - Because the dermatitis had initially improved, but then was beginning to flare again, I had feared that the dermatitis was a consequence of untreated NHL.   This was part of the motivation for proceeding on with 4th-line therapy      3.) Bowel perforation  - Presently treating conservatively with IVF, IV antibitoics, NPO.  - surgery following  - CT results noted 8/7/2020  - surgery planning UGI on Monday, plan surgery if he worsens      4.) Anasarca , chronic - venous dopplers negative   - Had been managing with Torsemide as an outpatient  - This issue is on hold for now.     5.) Atrial fibrillation     6.) COPD    7) Lytes  - Mg normal  - Ca improved      Barbara Kat MD

## 2020-08-09 NOTE — PROGRESS NOTES
Hospitalist Progress Note      PCP: Rockford Lesch, MD    Date of Admission: 8/4/2020    Chief Complaint: abd pain     Hospital Course:       Subjective:  ongoing intermittent pain seems better, tolerating clears(popsicles), wife not currently at bedside         Medications:  Reviewed    Infusion Medications    pantoprozole (PROTONIX) infusion 8 mg/hr (08/09/20 0139)     Scheduled Medications    [START ON 8/10/2020] pantoprazole  40 mg Intravenous Daily    And    [START ON 8/10/2020] sodium chloride (PF)  10 mL Intravenous Daily    piperacillin-tazobactam  3.375 g Intravenous Q6H    potassium chloride  10 mEq Oral TID    tiotropium  2 puff Inhalation Daily    dilTIAZem  120 mg Oral Daily    sodium chloride flush  10 mL Intravenous 2 times per day    enoxaparin  40 mg Subcutaneous Daily    methylPREDNISolone  30 mg Intravenous Q12H     PRN Meds: potassium chloride, hydrOXYzine, sodium chloride flush, acetaminophen **OR** acetaminophen, polyethylene glycol, promethazine **OR** ondansetron, HYDROmorphone, albuterol sulfate HFA, mineral oil-hydrophilic petrolatum      Intake/Output Summary (Last 24 hours) at 8/9/2020 0811  Last data filed at 8/9/2020 0647  Gross per 24 hour   Intake 602 ml   Output 1875 ml   Net -1273 ml       Physical Exam Performed:    /83   Pulse 86   Temp 98 °F (36.7 °C) (Oral)   Resp 18   Ht 6' 1\" (1.854 m)   Wt 151 lb 7.3 oz (68.7 kg)   SpO2 96%   BMI 19.98 kg/m²     General appearance:  No apparent distress, appears stated age and cooperative. HEENT:  Normal cephalic, atraumatic without obvious deformity. Pupils equal, round, and reactive to light.  Extra ocular muscles intact. Conjunctivae/corneas clear. Neck: Supple, with full range of motion. No jugular venous distention. Trachea midline. Respiratory:  Normal respiratory effort. Clear to auscultation, bilaterally without Rales/Wheezes/Rhonchi.   Cardiovascular:  Regular rate and rhythm with normal S1/S2 without from perforated diverticulitis. There   is wall thickening of the sigmoid colon in the pelvis with surrounding   inflammatory change. Given the lack of significant free air in the pelvis,   this is not likely to be the source of the perforation. Persistent small bowel wall thickening in the pelvis, likely reactive due to   the underlying pelvic ascites. CT ABDOMEN PELVIS WO CONTRAST Additional Contrast? Oral   Final Result   No extraluminal oral contrast to indicate a site of gastrointestinal   perforation         CT ABDOMEN PELVIS W IV CONTRAST Additional Contrast? None   Final Result   Addendum 1 of 1   ADDENDUM:   These results were communicated by telephone to Dr. Jordin Daniel at 11:06 a.m.    on   08/04/2020         Final              Assessment/Plan:    Active Hospital Problems    Diagnosis    Pneumoperitoneum [K66.8]    Abdominal pain, left lower quadrant [R10.32]     abd pain- with Pneumoperitoneum noted on CT  -gen surg consulted, apprec mgmt,  attempted to tx nonoperatively  -Tele  -Npo except meds/ice chips initially, advanced as tolerated  -Prn iv dilaudid ordered  IV zosyn started 8/4 and continued  -Gentle ivfs ordered   -repeat CT 8/7 noted moderate pneumoperitoneum(similar to prior, ?stomach as site of perforation, ?early pericolonic abscess)   -started on ppi ggt  -UGI planned for 8/10    Normocytic Anemia-stable  -monitored labs     Anasarca- due to steroids?   -held diuretics for now given above     afib- controlled, unclear why not on AC  -continued on dilt     Anxiety -on atarax prn     Asthma- per emr  On incruse(sub spiriva here) and albuterol     Drug rash -ongoing, attempted to wean as outpt but did not tolerate, related to recent chemo for NHL  -Switched po steroids to iv soluemdrol q12, mgmt per hemonc     NHL- mgt per hemon  -held home megace       DVT Prophylaxis: lovenox  Diet: Diet NPO, After Midnight Exceptions are: Sips with Meds, Ice Chips, Popsicles  Code Status: Full Code    PT/OT Eval Status: not ordered    Dispo - pending surgery workup, UGI on Monday    Stevo Roberts MD

## 2020-08-09 NOTE — PROGRESS NOTES
[facility] SUR[facility]    PATIENT NAME: Nena Mclean     TODAY'S DATE: 8/9/2020    CHIEF COMPLAINT: none    INTERVAL HISTORY/HPI:    Pt reports minimal pain, no nausea, no fevers or chills. REVIEW OF SYSTEMS:  Pertinent positives and negatives as per interval history section    OBJECTIVE:  VITALS:  /79   Pulse 90   Temp 98 °F (36.7 °C) (Oral)   Resp 18   Ht 6' 1\" (1.854 m)   Wt 151 lb 7.3 oz (68.7 kg)   SpO2 97%   BMI 19.98 kg/m²     INTAKE/OUTPUT:    I/O last 3 completed shifts: In: 602 [P. O.:50; I.V.:352; IV Piggyback:200]  Out: 2125 [Urine:2125]  I/O this shift:  In: -   Out: 500 [Urine:500]    CONSTITUTIONAL:  awake and alert  LUNGS:  Respirations easy and unlabored, no crackles or wheezing  CARD:  regular rate and rhythm  ABDOMEN:  normal bowel sounds, soft, non-distended, minimal tender     Data:  CBC:   Recent Labs     08/07/20  0525 08/08/20  0453 08/09/20  0425   WBC 9.9 8.0 8.2   HGB 9.1* 10.3* 10.3*   HCT 27.3* 30.9* 30.7*    133* 133*     BMP:    Recent Labs     08/07/20  0525 08/08/20  0453 08/09/20  0425    136 127*   K 4.1 5.0 4.6    104 98*   CO2 23 24 24   BUN 10 10 9   CREATININE 0.7* 0.7* 0.7*   GLUCOSE 150* 107* 119*     Hepatic: No results for input(s): AST, ALT, ALB, BILITOT, ALKPHOS in the last 72 hours. Mag:      Recent Labs     08/07/20  0525 08/08/20  0453   MG 1.60* 1.90      Phos:   No results for input(s): PHOS in the last 72 hours. INR: No results for input(s): INR in the last 72 hours. Radiology Review:  *Imaging personally reviewed by me. NA      ASSESSMENT AND PLAN:  78 yo with pneumoperitoneum  1. Plan UGI tomorrow  2.   Continue IV abx, PPI     Electronically signed by Chele Aguero, [address]

## 2020-08-10 NOTE — PROGRESS NOTES
Physical Therapy  Facility/Department: [facility] B3 - MED SURG  Daily Treatment Note  NAME: Nena Mclean  : 1945  MRN: [id number]    Date of Service: 8/10/2020    Discharge Recommendations:  24 hour supervision or assist, Home with Home health PT   PT Equipment Recommendations  Equipment Needed: Yes  Mobility Devices: Moises Profit: Rolling    Assessment   Body structures, Functions, Activity limitations: Decreased functional mobility ; Decreased balance;Decreased safe awareness;Decreased endurance;Decreased strength;Decreased posture  Assessment: Pt participated well with PT today, demonstrating improved strength, balance, and mobility compared to last PT session. Today pt was able to amb increased distance with support of RW. Occasional cues still needed for safe technique & walker management when turning corners. Recommend pt return home with 24-hr sup/assist for safety and home PT. Pt will also require RW for home use. Treatment Diagnosis: Decreased independence with functional mobility. Specific instructions for Next Treatment: Progress ther ex and mobility as tolerated  Prognosis: Good  Decision Making: Medium Complexity  PT Education: Goals;Precautions; Functional Mobility Training;PT Role;Transfer Training;Pressure Relief;Plan of Care;Equipment;Gait Training;Home Exercise Program;General Safety; Disease Specific Education; Energy Conservation  Patient Education: Disease-specific education: Patient educated on the importance of out of bed activity and pressure relief. Educated on importance of utilizing RW to help decrease his fall risk. Patient verbalized understanding. REQUIRES PT FOLLOW UP: Yes  Activity Tolerance: Patient Tolerated treatment well;Patient limited by endurance  Activity Tolerance: Pt mildly SOB with supine exercise and functional transfers. O2 sat = 98% while seated EOB on room air.   O2 sat = 98% immediately post-amb, HR = 122 bpm.  HR decreased to 110 bpm after seated rest break of ~2-3 minutes. Patient Diagnosis(es): The primary encounter diagnosis was Abdominal pain, left lower quadrant. A diagnosis of Pneumoperitoneum was also pertinent to this visit. has a past medical history of Blood transfusion, Cancer ([address] COPD, mild ([address] Dermatitis, Disease of blood and blood forming organ, History of blood transfusion, Medical history reviewed with no changes, Non Hodgkin's lymphoma ([address] Pneumonia, Pneumothorax, and Spontaneous pneumothorax.   has a past surgical history that includes Femur Surgery; Middle ear surgery; chest tube insertion; lymph node biopsy (12/11/12); Stomach surgery; Skin cancer destruction; Colonoscopy (4/9); Colonoscopy (5/15/13); Tunneled venous port placement; fracture surgery (1960); fracture surgery (2003); other surgical history (6/7/12); skin biopsy; and Thoracoscopy (Left, 01/08/2018). Restrictions  Restrictions/Precautions  Restrictions/Precautions: General Precautions, Fall Risk  Position Activity Restriction  Other position/activity restrictions: IV, telemetry  Subjective   General  Chart Reviewed: Yes  Response To Previous Treatment: Patient with no complaints from previous session. Family / Caregiver Present: No  Referring Practitioner: Chele Aguero MD  Subjective  Subjective: Pt agreeable to work with PT this morning, very pleasant and cooperative. Denies pain at rest.  C/o \"my stomach feeling a little touchy, but not painful. \"  General Comment  Comments: Pt resting in bed upon entry of therapy staff  Pain: Denies (pt denies actual pain; c/o \"my stomach feeling a little touchy\" but no pain)    Orientation  Orientation  Overall Orientation Status: Within Normal Limits    Objective   Bed mobility  Supine to Sit: Stand by assistance  Scooting: Stand by assistance     Transfers  Sit to Stand: Contact guard assistance;Minimal Assistance(CGA x 1 from EOB to RW, Raj x 1 from chair to RW later in session (pt more fatigued during Recommendations: Strengthening, Transfer Training, Endurance Training, Patient/Caregiver Education & Training, Balance Training, Gait Training, Home Exercise Program, Safety Education & Training, ADL/Self-care Training, Pain Management, Equipment Evaluation, Education, & procurement, Positioning, Stair training, Functional Mobility Training  Safety Devices: All fall risk precautions in place, Gait belt, Patient at risk for falls, Nurse notified, Call light within reach, Left in chair, Chair alarm in place     Therapy Time   Individual Concurrent Group Co-treatment   Time In 0906         Time Out 0930         Minutes 24         Timed Code Treatment Minutes: Joe Gabriel [address], [facility] [place], [place] #[id number]    If pt is unable to be seen after this session, please let this note serve as discharge summary. Please see case management note for discharge disposition. Thank you.

## 2020-08-10 NOTE — PROGRESS NOTES
Hospitalist Progress Note      PCP: Juan Rock MD    Date of Admission: 8/4/2020    Chief Complaint: abd pain     Hospital Course:       Subjective:  ongoing intermittent pain seems better, tolerating clears(popsicles), wife not currently at bedside  No new event overnight noted. Medications:  Reviewed    Infusion Medications    pantoprozole (PROTONIX) infusion 8 mg/hr (08/10/20 0524)     Scheduled Medications    pantoprazole  40 mg Intravenous Daily    And    sodium chloride (PF)  10 mL Intravenous Daily    piperacillin-tazobactam  3.375 g Intravenous Q6H    potassium chloride  10 mEq Oral TID    tiotropium  2 puff Inhalation Daily    dilTIAZem  120 mg Oral Daily    sodium chloride flush  10 mL Intravenous 2 times per day    enoxaparin  40 mg Subcutaneous Daily    methylPREDNISolone  30 mg Intravenous Q12H     PRN Meds: potassium chloride, hydrOXYzine, sodium chloride flush, acetaminophen **OR** acetaminophen, polyethylene glycol, promethazine **OR** ondansetron, HYDROmorphone, albuterol sulfate HFA, mineral oil-hydrophilic petrolatum      Intake/Output Summary (Last 24 hours) at 8/10/2020 1048  Last data filed at 8/10/2020 0606  Gross per 24 hour   Intake 904 ml   Output 475 ml   Net 429 ml       Physical Exam Performed:    /74   Pulse 100   Temp 98.4 °F (36.9 °C) (Axillary)   Resp 20   Ht 6' 1\" (1.854 m)   Wt 146 lb 11.2 oz (66.5 kg)   SpO2 99%   BMI 19.35 kg/m²     General appearance:  No apparent distress, appears stated age and cooperative. HEENT:  Normal cephalic, atraumatic without obvious deformity. Pupils equal, round, and reactive to light.  Extra ocular muscles intact. Conjunctivae/corneas clear. Neck: Supple, with full range of motion. No jugular venous distention. Trachea midline. Respiratory:  Normal respiratory effort. Clear to auscultation, bilaterally without Rales/Wheezes/Rhonchi.   Cardiovascular:  Regular rate and rhythm with normal S1/S2 without murmurs, rubs or gallops. Abdomen: Soft, mildly tender(just received iv pain meds), non-distended with dec'd bowel sounds. Musculoskeletal:  No clubbing, cyanosis or edema bilaterally.  Full range of motion without deformity. Skin: Skin color, texture, turgor normal.  No rashes or lesions except diffuse erythema noted over entire body, small blistering noted on right hand, diffuse swelling/ansarca noted  Neurologic:  Neurovascularly intact without any focal sensory/motor deficits. Cranial nerves: II-XII intact, grossly non-focal.  Psychiatric:  Alert and oriented, thought content appropriate, normal insight  Capillary Refill: Brisk,< 3 seconds   Peripheral Pulses: +2 palpable, equal bilaterally       Labs:   Recent Labs     08/08/20 0453 08/09/20  0425 08/10/20  0530   WBC 8.0 8.2 8.0   HGB 10.3* 10.3* 11.4*   HCT 30.9* 30.7* 34.7*   * 133* 125*     Recent Labs     08/08/20 0453 08/09/20  0425 08/10/20  0530    127* 133*   K 5.0 4.6 4.9    98* 102   CO2 24 24 23   BUN 10 9 13   CREATININE 0.7* 0.7* 0.9   CALCIUM 7.0* 7.0* 7.1*       Urinalysis:      Lab Results   Component Value Date    NITRU Negative 08/04/2020    WBCUA 0-2 08/04/2020    BACTERIA Rare 08/04/2020    RBCUA 3-4 08/04/2020    BLOODU Negative 08/04/2020    SPECGRAV 1.020 08/04/2020    GLUCOSEU Negative 08/04/2020    GLUCOSEU NEGATIVE 11/30/2011       Radiology:  FL UGI   Final Result   No contrast extravasation noted         CT ABDOMEN PELVIS W IV CONTRAST   Final Result   Moderate amount of pneumoperitoneum, similar to prior. There is a small   linear tract of gas marginating the anterior pyloric region of the stomach   anteriorly, which could suggest site of perforation. Focal collection of fluid and gas marginates the sigmoid colon in the pelvis,   suggesting early pericolonic abscess from perforated diverticulitis. There   is wall thickening of the sigmoid colon in the pelvis with surrounding   inflammatory change.   Given the lack of significant free air in the pelvis,   this is not likely to be the source of the perforation. Persistent small bowel wall thickening in the pelvis, likely reactive due to   the underlying pelvic ascites. CT ABDOMEN PELVIS WO CONTRAST Additional Contrast? Oral   Final Result   No extraluminal oral contrast to indicate a site of gastrointestinal   perforation         CT ABDOMEN PELVIS W IV CONTRAST Additional Contrast? None   Final Result   Addendum 1 of 1   ADDENDUM:   These results were communicated by telephone to Dr. Osman Jacques at 11:06 a.m.    on   08/04/2020         Final              Assessment/Plan:    Active Hospital Problems    Diagnosis    Pneumoperitoneum [K66.8]    Abdominal pain, left lower quadrant [R10.32]     abd pain- with Pneumoperitoneum noted on CT  -gen surg consulted, apprec mgmt,  attempted to tx nonoperatively  -Tele  -Npo except meds/ice chips initially, advanced as tolerated  -Prn iv dilaudid ordered  IV zosyn started 8/4 and continued  -Gentle ivfs ordered   -repeat CT 8/7 noted moderate pneumoperitoneum(similar to prior, ?stomach as site of perforation, ?early pericolonic abscess)   -started on ppi ggt  -UGI planned for 8/10  We will continue to follow along with surgery. Normocytic Anemia-stable  -monitored labs     Anasarca- due to steroids?   -held diuretics for now given above     afib- controlled, unclear why not on AC  -continued on dilt     Anxiety -on atarax prn     Asthma- per emr  On incruse(sub spiriva here) and albuterol     Drug rash -ongoing, attempted to wean as outpt but did not tolerate, related to recent chemo for NHL  -Switched po steroids to iv soluemdrol q12, mgmt per hemonc     NHL- mgt per [facility]  -held home megace       DVT Prophylaxis: lovenox  Diet: Diet NPO, After Midnight Exceptions are: Sips with Meds, Ice Chips, Popsicles  Code Status: Full Code    PT/OT Eval Status: not ordered    Dispo - pending surgery workup, UGI on Monday    Corbin LOVELACE Rene Singleton MD

## 2020-08-10 NOTE — CARE COORDINATION
Reviewed dc options and plan of care. Patient wants to discuss Marah Javier with wife before committing to plan. Will f/u tomorrow to confirm plan.      LG

## 2020-08-10 NOTE — PROGRESS NOTES
[facility] SURGERY    PATIENT NAME: Sara Murray     TODAY'S DATE: 8/10/2020    CHIEF COMPLAINT: none    INTERVAL HISTORY/HPI:    Pt denies abd pain, reports he is hungry and wants to eat. REVIEW OF SYSTEMS:  Pertinent positives and negatives as per interval history section    OBJECTIVE:  VITALS:  /73   Pulse 98   Temp 98.2 °F (36.8 °C) (Oral)   Resp 18   Ht 6' 1\" (1.854 m)   Wt 146 lb 11.2 oz (66.5 kg)   SpO2 98%   BMI 19.35 kg/m²     INTAKE/OUTPUT:    I/O last 3 completed shifts: In: 904 [P.O.:430; I.V.:274; IV Piggyback:200]  Out: 975 [Urine:975]  No intake/output data recorded. CONSTITUTIONAL:  awake and alert  LUNGS:  Respirations easy and unlabored, no crackles or wheezing  CARD:  regular rate and rhythm  ABDOMEN:  normal bowel sounds, soft, non-distended, minimal tender     Data:  CBC:   Recent Labs     08/08/20  0453 08/09/20  0425 08/10/20  0530   WBC 8.0 8.2 8.0   HGB 10.3* 10.3* 11.4*   HCT 30.9* 30.7* 34.7*   * 133* 125*     BMP:    Recent Labs     08/08/20  0453 08/09/20  0425 08/10/20  0530    127* 133*   K 5.0 4.6 4.9    98* 102   CO2 24 24 23   BUN 10 9 13   CREATININE 0.7* 0.7* 0.9   GLUCOSE 107* 119* 122*     Hepatic: No results for input(s): AST, ALT, ALB, BILITOT, ALKPHOS in the last 72 hours. Mag:      Recent Labs     08/08/20  0453   MG 1.90      Phos:   No results for input(s): PHOS in the last 72 hours. INR: No results for input(s): INR in the last 72 hours. Radiology Review:  *Imaging personally reviewed by me. EXAMINATION:   SINGLE CONTRAST UPPER GI SERIES     8/10/2020     HISTORY:   ORDERING SYSTEM PROVIDED HISTORY: pneumoperitoneum, rule out ulcer,   TECHNOLOGIST PROVIDED HISTORY:   Reason for exam:->pneumoperitoneum, rule out ulcer,   Reason for exam:->please use water soluble contrast   Reason for Exam: pneumoperitoneum, rule out ulcer,;   Acuity: Acute   Type of Exam: Initial     COMPARISON:   None.      TECHNIQUE:   Multiple

## 2020-08-10 NOTE — PROGRESS NOTES
Occupational Therapy  Facility/Department: [facility] B3 - MED SURG  Daily Treatment Note  NAME: Micha Aiken  : 1945  MRN: [id number]    Date of Service: 8/10/2020    Discharge Recommendations:  Home with Home health OT, 24 hour supervision or assist       Assessment   Performance deficits / Impairments: Decreased functional mobility ; Decreased endurance;Decreased ADL status; Decreased balance;Decreased high-level IADLs;Decreased safe awareness  Assessment: Patient completed BUE AROM exercises this date meeting STG. CGA for mobility and transfers using RW. Pt. would continue to benefit from skilled OT sessions to address above deficits. OT Education: OT Role;Plan of Care;Home Exercise Program;ADL Adaptive Strategies;Transfer Training;Energy Conservation;Equipment  Disease specific education for SOB during session; Patient educated on pursed lip breathing to aid in recovery and energy conservation s/p therapeutic activity as related to patient's respiratory condition. Patient verbalized understanding of above education. REQUIRES OT FOLLOW UP: Yes  Activity Tolerance  Activity Tolerance: Patient Tolerated treatment well  Safety Devices  Safety Devices in place: Yes  Type of devices: Left in bed;Bed alarm in place;Call light within reach;Nurse notified;Gait belt         Patient Diagnosis(es): The primary encounter diagnosis was Abdominal pain, left lower quadrant. A diagnosis of Pneumoperitoneum was also pertinent to this visit. has a past medical history of Blood transfusion, Cancer ([address] COPD, mild ([address] Dermatitis, Disease of blood and blood forming organ, History of blood transfusion, Medical history reviewed with no changes, Non Hodgkin's lymphoma ([address] Pneumonia, Pneumothorax, and Spontaneous pneumothorax.   has a past surgical history that includes Femur Surgery; Middle ear surgery; chest tube insertion; lymph node biopsy (12);  Stomach surgery; Skin cancer destruction; Colonoscopy ();

## 2020-08-10 NOTE — PROGRESS NOTES
127* 133*   K 5.0 4.6 4.9    98* 102   CO2 24 24 23   BUN 10 9 13   CREATININE 0.7* 0.7* 0.9     No results for input(s): AST, ALT, ALB, BILIDIR, BILITOT, ALKPHOS in the last 72 hours. Magnesium:    Lab Results   Component Value Date    MG 1.90 08/08/2020    MG 1.60 08/07/2020    MG 1.20 08/05/2020     CT A/P 8/7/2020: Moderate amount of pneumoperitoneum, similar to prior. Ricka Backers is a small    linear tract of gas marginating the anterior pyloric region of the stomach    anteriorly, which could suggest site of perforation.         Focal collection of fluid and gas marginates the sigmoid colon in the pelvis,    suggesting early pericolonic abscess from perforated diverticulitis.  There    is wall thickening of the sigmoid colon in the pelvis with surrounding    inflammatory change.  Given the lack of significant free air in the pelvis,    this is not likely to be the source of the perforation.         Persistent small bowel wall thickening in the pelvis, likely reactive due to    the underlying pelvic ascites. 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  - Recently status post treatment with 3rd-line [id number] in the context of a clinical trial between 10/30/2019 - 7/14/2020. - A recent CT scan on 7/31/2020 had shown slight progression.   The plan had been for R-Bendamustine to start (8/05/2020) but we will need to hold chemo.     2.) Exfoliative dermatitis  - Study drug was discontinued due to this adverse event. - We have been treating with Prednisone (Methylpred as an inpatient), foamy cleanser in the AM then an emollient (Aquaphor). - Because the dermatitis had initially improved, but then was beginning to flare again, I had feared that the dermatitis was a consequence of untreated NHL.   This was part of the motivation for proceeding on with 4th-line therapy      3.) Bowel perforation  - Presently treating conservatively with IVF, IV antibitoics, NPO.  - surgery following  - CT results noted 8/7/2020  - Upper Gi series without extravasation   - Full liq diet      4.) Anasarca , chronic - venous dopplers negative   - Had been managing with Torsemide as an outpatient  - This issue is on hold for now.     5.) Atrial fibrillation     6.) COPD    7) Lytes  - Mg normal  - Ca improved      Mickeal Melter, CNP

## 2020-08-11 NOTE — PROGRESS NOTES
Occupational Therapy  Facility/Department: [facility] B3 - MED SURG  Daily Treatment Note  NAME: Moni Emery  : 1945  MRN: [id number]    Date of Service: 2020    Discharge Recommendations:  Home with Home health OT, 24 hour supervision or assist       Assessment   Performance deficits / Impairments: Decreased functional mobility ; Decreased endurance;Decreased ADL status; Decreased balance;Decreased high-level IADLs;Decreased safe awareness  Assessment: Pt continues to demos the deficits listed above. Pt able to complete ADLs while standing at sink. However, pt requires seated rest break due to fatigue. Pt provided with [facility] chair. Following short seated break pt able to manage short house hold distance with RW. Pt requires cues for PLB in order to increase O2. Unable to achieve accurate reading of O2 following activity. Pt's /82 . Pt will continue to benefit from increased skilled OT in order to achieve therapy goals. Patient Education: OT role, energy conservation  REQUIRES OT FOLLOW UP: Yes  Activity Tolerance  Activity Tolerance: Patient limited by fatigue  Activity Tolerance: vitals stable. following completion of ADLs while standing. pt requires seated rest break. Safety Devices  Type of devices: Call light within reach; Chair alarm in place; Left in chair;Nurse notified         Patient Diagnosis(es): The primary encounter diagnosis was Abdominal pain, left lower quadrant. A diagnosis of Pneumoperitoneum was also pertinent to this visit. has a past medical history of Blood transfusion, Cancer ([address] COPD, mild ([address] Dermatitis, Disease of blood and blood forming organ, History of blood transfusion, Medical history reviewed with no changes, Non Hodgkin's lymphoma ([address] Pneumonia, Pneumothorax, and Spontaneous pneumothorax.   has a past surgical history that includes Femur Surgery; Middle ear surgery; chest tube insertion; lymph node biopsy (12);  Stomach surgery; Skin cancer destruction; Colonoscopy (4/9); Colonoscopy (5/15/13); Tunneled venous port placement; fracture surgery (1960); fracture surgery (2003); other surgical history (6/7/12); skin biopsy; and Thoracoscopy (Left, 01/08/2018). Restrictions  Restrictions/Precautions  Restrictions/Precautions: General Precautions, Fall Risk  Position Activity Restriction  Other position/activity restrictions: IV, telemetry  Subjective   General  Chart Reviewed: Yes  Patient assessed for rehabilitation services?: Yes  Family / Caregiver Present: No  Referring Practitioner: Ulysses Parma, MD  Diagnosis: pneumoperitoneum  Subjective  Subjective: Pt. pleasant and agreeable to OT session.  Session feeling okay, \"I have weights here but not sure where they are\"  General Comment  Comments: Per RN ok for therapy  Pain Assessment  Pain Assessment: 0-10  Pain Level: 3  Patient's Stated Pain Goal: No pain  Pain Type: Acute pain  Pain Location: Abdomen  Pain Descriptors: Aching  Non-Pharmaceutical Pain Intervention(s): Ambulation/Increased Activity;Repositioned  Vital Signs  Patient Currently in Pain: Yes   Orientation  Orientation  Overall Orientation Status: Within Functional Limits  Objective    ADL  Grooming: Stand by assistance(requires seated rest break at sink following activity)        Balance  Sitting Balance: Stand by assistance  Standing Balance: Contact guard assistance  Standing Balance  Time: 3 minutes  Activity: functional mobility in room and bathroom, standing ADLs  Comment: RW for longer mobility with increased stability  Functional Mobility  Functional - Mobility Device: Rolling Walker  Activity: To/from bathroom  Assist Level: Contact guard assistance  Functional Mobility Comments: CGA with RW  Bed mobility  Comment: pt sitting in recliner upon OT arrival  Transfers  Sit to stand: Contact guard assistance  Stand to sit: Contact guard assistance         Cognition  Overall Cognitive Status: [facility]           Plan   Plan  Times per week: 3-5x/wk  Current Treatment Recommendations: Strengthening, Patient/Caregiver Education & Training, ROM, Equipment Evaluation, Education, & procurement, Balance Training, Functional Mobility Training, Positioning, Endurance Training, Safety Education & Training, Self-Care / ADL    AM-PAC Score        AM-PAC Inpatient Daily Activity Raw Score: 18 (08/11/20 1233)  AM-PAC Inpatient ADL T-Scale Score : 38.66 (08/11/20 1233)  ADL Inpatient CMS 0-100% Score: 46.65 (08/11/20 1233)  ADL Inpatient CMS G-Code Modifier : CK (08/11/20 1233)    Goals  Short term goals  Time Frame for Short term goals: within one week (8/15)  Short term goal 1: Pt demonstrates LB dressing with AE prn and LRAD (SBA)  Short term goal 2: Pt demonstrates BUE AROM there ex x10-15 reps by 8/10-STG met 8/10  Short term goal 3: Pt demonstrates x5 min standing dynamic balance activity using LRAD (SBA)  Patient Goals   Patient goals : \"eat something\"       Therapy Time   Individual Concurrent Group Co-treatment   Time In 0538         Time Out 1155         Minutes 27         Timed Code Treatment Minutes: Gabby 34, OT COUGH

## 2020-08-11 NOTE — PROGRESS NOTES
Nutrition Assessment     Type and Reason for Visit: Reassess    Nutrition Recommendations/Plan:   1. Continue oral diet progression per Gen surg  2. Add ensure enlive while on FLD  3. Monitor improvements in appetite and GI function  4. Monitor nutrition adequacy, pertinent labs, bowel habits, wt changes, and clinical progress     Nutrition Assessment:  Pt remains nutritionally compromised AEB full liquid diet order. He is tolerating diet consuming % of meals. C/o bloating. Per MD, Awaiting further improvement in GI function p/t advancing diet. Will add standard ONS to optimize intakes    Malnutrition Assessment:  Malnutrition Status:  At risk for malnutrition (Comment)    Estimated Daily Nutrient Needs:  Energy (kcal): 3693-8608 kcal; Weight Used for Energy Requirements:  Current(74.5 kg)     Protein (g):  g; Weight Used for Protein Requirements:  Current(1.2-1.5 g/kg)        Fluid (ml/day): 1 ml/kcal; Weight Used for Fluid Requirements:  Current      Nutrition Related Findings: BM x1 on 8/7 (loose), +4 generalized edema      Current Nutrition Therapies:    DIET FULL LIQUID;  Dietary Nutrition Supplements: Standard High Calorie Oral Supplement    Anthropometric Measures:  · Height: 6' 1\" (185.4 cm)  · Current Body Wt: 160 lb (72.6 kg)   · BMI: 21.1    Nutrition Diagnosis:   · Inadequate oral intake related to inadequate protein-energy intake, lack or limited access to food as evidenced by NPO or clear liquid status due to medical condition, wounds, diarrhea      Nutrition Interventions:   Food and/or Nutrient Delivery:  Continue Current Diet, Start Oral Nutrition Supplement  Nutrition Education/Counseling:  No recommendation at this time   Coordination of Nutrition Care:  Continued Inpatient Monitoring    Goals:  Pt will tolerate most appropriate form of nutrition this admission       Nutrition Monitoring and Evaluation:   Food/Nutrient Intake Outcomes:  Food and Nutrient Intake, Supplement Intake, Parenteral Nutrition Intake/Tolerance  Physical Signs/Symptoms Outcomes:  Biochemical Data, Weight     Discharge Planning: Too soon to determine     Electronically signed by Carlos Pérez.  Valente Burch RD, LD on 8/11/20 at 2:28 PM EDT    Contact: [phone]

## 2020-08-11 NOTE — PROGRESS NOTES
Hospitalist Progress Note      PCP: Shakir Galvan MD    Date of Admission: 8/4/2020    Chief Complaint: abd pain     Hospital Course:       Subjective:  ongoing intermittent pain seems better, tolerating clears(popsicles), wife not currently at bedside  No new event overnight noted. Medications:  Reviewed    Infusion Medications     Scheduled Medications    pantoprazole  40 mg Intravenous BID    piperacillin-tazobactam  3.375 g Intravenous Q6H    tiotropium  2 puff Inhalation Daily    dilTIAZem  120 mg Oral Daily    sodium chloride flush  10 mL Intravenous 2 times per day    enoxaparin  40 mg Subcutaneous Daily    methylPREDNISolone  30 mg Intravenous Q12H     PRN Meds: potassium chloride, hydrOXYzine, sodium chloride flush, acetaminophen **OR** acetaminophen, polyethylene glycol, promethazine **OR** ondansetron, HYDROmorphone, albuterol sulfate HFA, mineral oil-hydrophilic petrolatum      Intake/Output Summary (Last 24 hours) at 8/11/2020 1109  Last data filed at 8/11/2020 0912  Gross per 24 hour   Intake 1720 ml   Output 350 ml   Net 1370 ml       Physical Exam Performed:    /72   Pulse 92   Temp 97.6 °F (36.4 °C) (Oral)   Resp 16   Ht 6' 1\" (1.854 m)   Wt 146 lb 6.2 oz (66.4 kg)   SpO2 95%   BMI 19.31 kg/m²     General appearance:  No apparent distress, appears stated age and cooperative. HEENT:  Normal cephalic, atraumatic without obvious deformity. Pupils equal, round, and reactive to light.  Extra ocular muscles intact. Conjunctivae/corneas clear. Neck: Supple, with full range of motion. No jugular venous distention. Trachea midline. Respiratory:  Normal respiratory effort. Clear to auscultation, bilaterally without Rales/Wheezes/Rhonchi. Cardiovascular:  Regular rate and rhythm with normal S1/S2 without murmurs, rubs or gallops. Abdomen: Soft, mildly tender(just received iv pain meds), non-distended with dec'd bowel sounds.   Musculoskeletal:  No clubbing, cyanosis or edema bilaterally.  Full range of motion without deformity. Skin: Skin color, texture, turgor normal.  No rashes or lesions except diffuse erythema noted over entire body, small blistering noted on right hand, diffuse swelling/ansarca noted  Neurologic:  Neurovascularly intact without any focal sensory/motor deficits. Cranial nerves: II-XII intact, grossly non-focal.  Psychiatric:  Alert and oriented, thought content appropriate, normal insight  Capillary Refill: Brisk,< 3 seconds   Peripheral Pulses: +2 palpable, equal bilaterally       Labs:   Recent Labs     08/09/20  0425 08/10/20  0530 08/11/20  0605   WBC 8.2 8.0 8.4   HGB 10.3* 11.4* 10.9*   HCT 30.7* 34.7* 33.0*   * 125* 102*     Recent Labs     08/09/20  0425 08/10/20  0530 08/11/20  0605   * 133* 137   K 4.6 4.9 5.5*   CL 98* 102 105   CO2 24 23 23   BUN 9 13 22*   CREATININE 0.7* 0.9 1.4*   CALCIUM 7.0* 7.1* 7.4*       Urinalysis:      Lab Results   Component Value Date    NITRU Negative 08/04/2020    WBCUA 0-2 08/04/2020    BACTERIA Rare 08/04/2020    RBCUA 3-4 08/04/2020    BLOODU Negative 08/04/2020    SPECGRAV 1.020 08/04/2020    GLUCOSEU Negative 08/04/2020    GLUCOSEU NEGATIVE 11/30/2011       Radiology:  [place] UGI   Final Result   No contrast extravasation noted         CT ABDOMEN PELVIS W IV CONTRAST   Final Result   Moderate amount of pneumoperitoneum, similar to prior. There is a small   linear tract of gas marginating the anterior pyloric region of the stomach   anteriorly, which could suggest site of perforation. Focal collection of fluid and gas marginates the sigmoid colon in the pelvis,   suggesting early pericolonic abscess from perforated diverticulitis. There   is wall thickening of the sigmoid colon in the pelvis with surrounding   inflammatory change. Given the lack of significant free air in the pelvis,   this is not likely to be the source of the perforation.       Persistent small bowel wall thickening in the pelvis, likely reactive due to   the underlying pelvic ascites. CT ABDOMEN PELVIS WO CONTRAST Additional Contrast? Oral   Final Result   No extraluminal oral contrast to indicate a site of gastrointestinal   perforation         CT ABDOMEN PELVIS W IV CONTRAST Additional Contrast? None   Final Result   Addendum 1 of 1   ADDENDUM:   These results were communicated by telephone to Dr. Yao Rivera at 11:06 a.m.    on   08/04/2020         Final              Assessment/Plan:    Active Hospital Problems    Diagnosis    Pneumoperitoneum [K66.8]    Abdominal pain, left lower quadrant [R10.32]     abd pain- with Pneumoperitoneum noted on CT  -gen surg consulted, apprec mgmt,  attempted to tx nonoperatively  -Tele  -Npo except meds/ice chips initially, advanced as tolerated  -Prn iv dilaudid ordered  IV zosyn started 8/4 and continued  -Gentle ivfs ordered   -repeat CT 8/7 noted moderate pneumoperitoneum(similar to prior, ?stomach as site of perforation, ?early pericolonic abscess)   -started on ppi ggt  -UGI planned for 8/10, it was normal.  We will continue to follow along with surgery. Normocytic Anemia-stable  -monitored labs     Anasarca- due to steroids?   -held diuretics for now given above     afib- controlled, unclear why not on AC  -continued on dilt     Anxiety -on atarax prn     Asthma- per emr  On incruse(sub spiriva here) and albuterol     Drug rash -ongoing, attempted to wean as outpt but did not tolerate, related to recent chemo for NHL  -Switched po steroids to iv soluemdrol q12, mgmt per hemonc     NHL- mgt per hemon  -held home megace       DVT Prophylaxis: lovenox  Diet: DIET FULL LIQUID;  Code Status: Full Code    PT/OT Eval Status: not ordered    Dispo - pending surgery workup    Ti Tolliver MD

## 2020-08-11 NOTE — PROGRESS NOTES
[facility]    PATIENT NAME: Manny Almodoavr     TODAY'S DATE: 8/11/2020    CHIEF COMPLAINT: mild lower pelvic pain    INTERVAL HISTORY/HPI:    Pt reports he feels some bloated today, no BM since yesterday. Some lower pelvic pain as well. No nausea     REVIEW OF SYSTEMS:  Pertinent positives and negatives as per interval history section    OBJECTIVE:  VITALS:  /70   Pulse 104   Temp 97.6 °F (36.4 °C) (Oral)   Resp 16   Ht 6' 1\" (1.854 m)   Wt 146 lb 6.2 oz (66.4 kg)   SpO2 95%   BMI 19.31 kg/m²     INTAKE/OUTPUT:    I/O last 3 completed shifts: In: 1230 [P.O.:1230]  Out: 350 [Urine:350]  I/O this shift: In: 490 [P.O.:480; I.V.:10]  Out: -     CONSTITUTIONAL:  awake and alert  LUNGS:  Respirations easy and unlabored, no crackles or wheezing  CARD:  regular rate and rhythm  ABDOMEN:  normal bowel sounds, soft, mild-mod distention, minimal tender     Data:  CBC:   Recent Labs     08/09/20  0425 08/10/20  0530 08/11/20  0605   WBC 8.2 8.0 8.4   HGB 10.3* 11.4* 10.9*   HCT 30.7* 34.7* 33.0*   * 125* 102*     BMP:    Recent Labs     08/09/20  0425 08/10/20  0530 08/11/20  0605   * 133* 137   K 4.6 4.9 5.5*   CL 98* 102 105   CO2 24 23 23   BUN 9 13 22*   CREATININE 0.7* 0.9 1.4*   GLUCOSE 119* 122* 147*         ASSESSMENT AND PLAN:  78 yo with pneumoperitoneum of uncertain etiology    Continue with full liquid diet today given bloating, some abd pain. Continue with IV antibiotics. Electronically signed by ANAID Curiel - [address] [facility]    Surgery Staff    I have examined this patient and read and agree with the note by Rashida Balbuena CNP from today. Await further/improved bowel function, appetite before advancing diet.       [employer] MELO

## 2020-08-11 NOTE — CARE COORDINATION
Spoke with patient/wife regarding plan of care and recs for [facility]; wife agreeable and states no preference for agency; agreeable to referral to [facility]; called referral to intake RN; REDDY to f/u at [facility].

## 2020-08-12 NOTE — PROGRESS NOTES
Recent Labs     08/10/20  0530 08/11/20  0605 08/12/20  0615   * 137 132*   K 4.9 5.5* 5.4*    105 101   CO2 23 23 23   BUN 13 22* 28*   CREATININE 0.9 1.4* 1.5*     No results for input(s): AST, ALT, ALB, BILIDIR, BILITOT, ALKPHOS in the last 72 hours. Magnesium:    Lab Results   Component Value Date    MG 1.90 08/08/2020    MG 1.60 08/07/2020    MG 1.20 08/05/2020     CT A/P 8/7/2020: Moderate amount of pneumoperitoneum, similar to prior. Tomas Humble is a small    linear tract of gas marginating the anterior pyloric region of the stomach    anteriorly, which could suggest site of perforation.         Focal collection of fluid and gas marginates the sigmoid colon in the pelvis,    suggesting early pericolonic abscess from perforated diverticulitis.  There    is wall thickening of the sigmoid colon in the pelvis with surrounding    inflammatory change.  Given the lack of significant free air in the pelvis,    this is not likely to be the source of the perforation.         Persistent small bowel wall thickening in the pelvis, likely reactive due to    the underlying pelvic ascites. 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  - Recently status post treatment with 3rd-line [id number] in the context of a clinical trial between 10/30/2019 - 7/14/2020. - A recent CT scan on 7/31/2020 had shown slight progression.   The plan had been for R-Bendamustine to start (8/05/2020) but we will need to hold chemo.     2.) Exfoliative dermatitis  - Study drug was discontinued due to this adverse event. - We have been treating with Prednisone (Methylpred as an inpatient), foamy cleanser in the AM then an emollient (Aquaphor). - Because the dermatitis had initially improved, but then was beginning to flare again, I had feared that the dermatitis was a consequence of untreated NHL. This was part of the motivation for proceeding on with 4th-line therapy      3.) Bowel perforation  - Presently treating conservatively with IVF, IV antibitoics, advance diet as tolerated   - surgery following  - CT results noted 8/7/2020  - Upper Gi series without extravasation   - low fiber diet now, pending abd XRAY- CT tomorrow pending XRAY results      4.) Anasarca , chronic - venous dopplers negative   - Had been managing with Torsemide as an outpatient  - This issue is on hold for now.   - Defer resuming diuretics to nephrology      5.) Atrial fibrillation     6.) COPD    7) Electrolyte disturbance   - Hyperkalemia and hypocalcemia  - Ask nephrology to see     8) TAZ- likely pre- renal  - back on fluids   - ask renal to see   - assess TLS labs with uric acid, phos and LDH- higher risk with underlying lymphoma   - Discussed with hospitalist as well     9) Heme   - plt 105, cont to trend   - slight drop in plt count can be secondary to anasarca     Amena Mcmahon, CNP

## 2020-08-12 NOTE — PROGRESS NOTES
Hospitalist Progress Note      PCP: Bolivar Abel MD    Date of Admission: 8/4/2020    Chief Complaint: abd pain     Hospital Course:       Subjective:  ongoing intermittent pain seems better, tolerating clears(popsicles), wife not currently at bedside  No new event overnight noted. Medications:  Reviewed    Infusion Medications    dextrose 5 % and 0.45 % NaCl       Scheduled Medications    pantoprazole  40 mg Intravenous BID    piperacillin-tazobactam  3.375 g Intravenous Q6H    tiotropium  2 puff Inhalation Daily    dilTIAZem  120 mg Oral Daily    sodium chloride flush  10 mL Intravenous 2 times per day    enoxaparin  40 mg Subcutaneous Daily    methylPREDNISolone  30 mg Intravenous Q12H     PRN Meds: potassium chloride, hydrOXYzine, sodium chloride flush, acetaminophen **OR** acetaminophen, polyethylene glycol, promethazine **OR** ondansetron, HYDROmorphone, albuterol sulfate HFA, mineral oil-hydrophilic petrolatum      Intake/Output Summary (Last 24 hours) at 8/12/2020 1048  Last data filed at 8/12/2020 1023  Gross per 24 hour   Intake 610 ml   Output 500 ml   Net 110 ml       Physical Exam Performed:    /74   Pulse 90   Temp 98.2 °F (36.8 °C) (Oral)   Resp 16   Ht 6' 1\" (1.854 m)   Wt 148 lb 11.2 oz (67.4 kg)   SpO2 95%   BMI 19.62 kg/m²     General appearance:  No apparent distress, appears stated age and cooperative. HEENT:  Normal cephalic, atraumatic without obvious deformity. Pupils equal, round, and reactive to light.  Extra ocular muscles intact. Conjunctivae/corneas clear. Neck: Supple, with full range of motion. No jugular venous distention. Trachea midline. Respiratory:  Normal respiratory effort. Clear to auscultation, bilaterally without Rales/Wheezes/Rhonchi. Cardiovascular:  Regular rate and rhythm with normal S1/S2 without murmurs, rubs or gallops.   Abdomen: Soft, mildly tender(just received iv pain meds), non-distended with dec'd bowel sounds. Musculoskeletal:  No clubbing, cyanosis or edema bilaterally.  Full range of motion without deformity. Skin: Skin color, texture, turgor normal.  No rashes or lesions except diffuse erythema noted over entire body, small blistering noted on right hand, diffuse swelling/ansarca noted  Neurologic:  Neurovascularly intact without any focal sensory/motor deficits. Cranial nerves: II-XII intact, grossly non-focal.  Psychiatric:  Alert and oriented, thought content appropriate, normal insight  Capillary Refill: Brisk,< 3 seconds   Peripheral Pulses: +2 palpable, equal bilaterally       Labs:   Recent Labs     08/10/20  0530 08/11/20  0605 08/12/20  0615   WBC 8.0 8.4 9.9   HGB 11.4* 10.9* 10.7*   HCT 34.7* 33.0* 32.3*   * 102* 105*     Recent Labs     08/10/20  0530 08/11/20  0605 08/12/20  0615   * 137 132*   K 4.9 5.5* 5.4*    105 101   CO2 23 23 23   BUN 13 22* 28*   CREATININE 0.9 1.4* 1.5*   CALCIUM 7.1* 7.4* 7.8*       Urinalysis:      Lab Results   Component Value Date    NITRU Negative 08/04/2020    WBCUA 0-2 08/04/2020    BACTERIA Rare 08/04/2020    RBCUA 3-4 08/04/2020    BLOODU Negative 08/04/2020    SPECGRAV 1.020 08/04/2020    GLUCOSEU Negative 08/04/2020    GLUCOSEU NEGATIVE 11/30/2011       Radiology:  [place] UGI   Final Result   No contrast extravasation noted         CT ABDOMEN PELVIS W IV CONTRAST   Final Result   Moderate amount of pneumoperitoneum, similar to prior. There is a small   linear tract of gas marginating the anterior pyloric region of the stomach   anteriorly, which could suggest site of perforation. Focal collection of fluid and gas marginates the sigmoid colon in the pelvis,   suggesting early pericolonic abscess from perforated diverticulitis. There   is wall thickening of the sigmoid colon in the pelvis with surrounding   inflammatory change. Given the lack of significant free air in the pelvis,   this is not likely to be the source of the perforation. Persistent small bowel wall thickening in the pelvis, likely reactive due to   the underlying pelvic ascites. CT ABDOMEN PELVIS WO CONTRAST Additional Contrast? Oral   Final Result   No extraluminal oral contrast to indicate a site of gastrointestinal   perforation         CT ABDOMEN PELVIS W IV CONTRAST Additional Contrast? None   Final Result   Addendum 1 of 1   ADDENDUM:   These results were communicated by telephone to Dr. Bel Conklin at 11:06 a.m.    on   08/04/2020         Final      XR ACUTE ABD SERIES CHEST 1 VW    (Results Pending)           Assessment/Plan:    Active Hospital Problems    Diagnosis    Pneumoperitoneum [K66.8]    Abdominal pain, left lower quadrant [R10.32]     abd pain- with Pneumoperitoneum noted on CT  -gen surg consulted, apprec mgmt,  attempted to tx nonoperatively  -Tele  -Npo except meds/ice chips initially, advanced as tolerated  -Prn iv dilaudid ordered  IV zosyn started 8/4 and continued  -Gentle ivfs ordered   -repeat CT 8/7 noted moderate pneumoperitoneum(similar to prior, ?stomach as site of perforation, ?early pericolonic abscess)   -started on ppi ggt  -UGI planned for 8/10, it was normal.  We will continue to follow along with surgery. Normocytic Anemia-stable  -monitored labs     Anasarca- due to steroids?   -held diuretics for now given above     afib- controlled, unclear why not on AC  -continued on dilt     Anxiety -on atarax prn     Asthma- per emr  On incruse(sub spiriva here) and albuterol     Drug rash -ongoing, attempted to wean as outpt but did not tolerate, related to recent chemo for NHL  -Switched po steroids to iv soluemdrol q12, mgmt per hemonc     NHL- mgt per hemon  -held home megace    TAZ, likely prerenal, started on IV hydration, monitor closely, oncology following to rule out tumor lysis syndrome.       DVT Prophylaxis: lovenox  Diet: Dietary Nutrition Supplements: Standard High Calorie Oral Supplement  DIET LOW FIBER;  Code Status: Full Code    PT/OT Eval Status: not ordered    Dispo - pending surgery workup    Andriy Taylor MD

## 2020-08-12 NOTE — CONSULTS
[facility]    Consult Note           Reason for Consult:  TAZ, Hyperkalemia  Requesting Physician:  Dr. Diego Wagner    Chief Complaint:    Chief Complaint   Patient presents with    Abdominal Pain     patient being treated for a type of lymphoma, had CT last friday for CT adbomen. Supposed to start chemo tomorrow. Yesterday developed severe pain in left lower side. Denies vomiting but had loose BM this AM. Has not takena ny medications for the pain. History of Present Illness on 8/12/20:    77 y.o. yo male with PMH of Mantle cell NHL on experimental drug until 7/14/20 which was discontinued d/t exfoliative dermatis and was treated with steroids, anasarca, COPF who is admitted for bowel perforation on 8/4/20 which was managed medically.  He has improved and had BM on 8/12 and was started on solids   On 8/8 he received iv contrast during CT scan  Pt reports of frequent urination and has condom catheter in place  Was on Po kcl until 8/10  Nephrology consulted for TAZ and hyperkalemia  No recent chemo    Past Medical History:        Diagnosis Date    Blood transfusion     Cancer ([address]     basal and squamous cell skin ca    COPD, mild ([address]     Dermatitis 7/14/2020    Disease of blood and blood forming organ     History of blood transfusion     Medical history reviewed with no changes     Non Hodgkin's lymphoma ([address] 12/6/12    chemo started 12/26/12x6, then stem cell tx    Pneumonia     lymphoma, pneumo history    Pneumothorax Dec. 2012    Spontaneous pneumothorax     x 3 last 11/3/2011       Past Surgical History:        Procedure Laterality Date    CHEST TUBE INSERTION      x3    COLONOSCOPY  4/9    polyps q 5 yrs    COLONOSCOPY  5/15/13    FEMUR SURGERY      r/t fracture with retained hardware    FRACTURE SURGERY  1960    broken arm    FRACTURE SURGERY  2003    femur    LYMPH NODE BIOPSY  12/11/12    right inguinal lymph node excision and biopsy    MIDDLE EAR SURGERY  OTHER SURGICAL HISTORY  6/7/12    stem cell transplant    SKIN BIOPSY      SKIN CANCER DESTRUCTION      STOMACH SURGERY      at age 7 weeks    THORACOSCOPY Left 01/08/2018    video assisted thoracoscopy,left upper lobe wedge resection;mechanical and chemical pleurodesis    TUNNELED VENOUS PORT PLACEMENT         Home Medications:    No current facility-administered medications on file prior to encounter. Current Outpatient Medications on File Prior to Encounter   Medication Sig Dispense Refill    predniSONE (DELTASONE) 20 MG tablet Take 20 mg by mouth daily Three pills qd      pantoprazole (PROTONIX) 20 MG tablet Take 20 mg by mouth daily      INCRUSE ELLIPTA 62.5 MCG/INH AEPB INHALE 1 PUFF INTO THE LUNGS DAILY 1 each 5    diltiazem (CARTIA XT) 120 MG extended release capsule Take 1 capsule by mouth daily 90 capsule 3    albuterol sulfate HFA (PROAIR HFA) 108 (90 Base) MCG/ACT inhaler Inhale 2 puffs into the lungs every 6 hours as needed for Wheezing or Shortness of Breath 1 Inhaler 5    megestrol (MEGACE) 40 MG tablet Take 200 mg by mouth 2 times daily         Allergies:  Latex and Adhesive tape    Social History:    Social History     Socioeconomic History    Marital status:      Spouse name: Rock Chan Number of children: Not on file    Years of education: 15    Highest education level: Not on file   Occupational History     Comment: fire dept retired   Social Needs    Financial resource strain: Not on file    Food insecurity     Worry: Not on file     Inability: Not on file   [employer] needs     Medical: Not on file     Non-medical: Not on file   Tobacco Use    Smoking status: Current Every Day Smoker     Packs/day: 0.50     Years: 50.00     Pack years: 25.00     Types: Cigarettes    Smokeless tobacco: Never Used    Tobacco comment: 1-2 cigs daily - 3/5/20   Substance and Sexual Activity    Alcohol use:  Yes     Alcohol/week: 0.0 standard drinks     Comment: 8 pack beer weekly    Drug use: No    Sexual activity: Never     Partners: Female   Lifestyle    Physical activity     Days per week: Not on file     Minutes per session: Not on file    Stress: Not on file   Relationships    Social connections     Talks on phone: Not on file     Gets together: Not on file     Attends [employer] service: Not on file     Active member of club or organization: Not on file     Attends meetings of clubs or organizations: Not on file     Relationship status: Not on file    Intimate partner violence     Fear of current or ex partner: Not on file     Emotionally abused: Not on file     Physically abused: Not on file     Forced sexual activity: Not on file   Other Topics Concern    Not on file   Social History Narrative    ** Merged History Encounter **            Family History:   Family History   Problem Relation Age of Onset    High Blood Pressure Father     Stroke Father 68    Alcohol Abuse Sister     Liver Disease Sister          age 55    Cancer Brother     COPD Brother     Cancer Brother         Brain / Lung       Review of Systems:   Pertinent positives stated above in HPI. All other 10 systems were reviewed and were negative.      Physical exam:   Constitutional:  VITALS:  /80   Pulse 100   Temp 97.8 °F (36.6 °C) (Oral)   Resp 16   Ht 6' 1\" (1.854 m)   Wt 148 lb 11.2 oz (67.4 kg)   SpO2 97%   BMI 19.62 kg/m²   Gen: alert, awake, nad  Skin: no rash, turgor wnl  Heent:  eomi, mmm  Neck: no bruits or jvd noted, thyroid normal  Cardiovascular:  S1, S2 without m/r/g  Respiratory: CTA B without w/r/r; respiratory effort normal  Abdomen:  +bs, soft, nt, nd, no hepatosplenomegaly  Ext: 1+ lower extremity edema  Neuro/Psy: AAoriented times 3 ; moves all 4 ext  Musculoskeletal:  Rom, muscular strength intact; digits, nails normal    Data/  Recent Labs     08/10/20  0530 20  0605 20  0615   WBC 8.0 8.4 9.9   HGB 11.4* 10.9* 10.7*   HCT 34.7* 33.0* 32.3*   MCV 91.1 90.7 90.8   * 102* 105*     Recent Labs     08/11/20  0605 08/12/20  0615 08/12/20  1338    132* 134*   K 5.5* 5.4* 5.8*    101 101   CO2 23 23 22   GLUCOSE 147* 115* 169*   PHOS  --   --  3.3   BUN 22* 28* 30*   CREATININE 1.4* 1.5* 1.4*   LABGLOM 49* 46* 49*   GFRAA 60* 55* 60*       Assessment  -TAZ  In the setting of limited Po and likely has urinary rentention as well. Work up for TLS but low suspicion  -Bowel perf s/p medical treatment  -Mantle cell lymphoma  -edema likley from hypoalbuminemia    Plan  -change IVF to NS at 100 ml/h  -add albumin, CK  -follow uric acid level  -UA w micro, lytes, cr  -lokelma 10 g tid   -walker catheter for PVR >200 ml  -Serial renal panel  -daily wts and strict i/o  -renal dose medications   -avoid nephrotoxins        Thank you for the consultation. Please do not hesitate to call with questions.     Emile Cowan  [facility]  Office: [phone]  Fax:    [phone]

## 2020-08-12 NOTE — PROGRESS NOTES
[facility] SURGERY    PATIENT NAME: Rj Michaels     TODAY'S DATE: 8/12/2020    CHIEF COMPLAINT: LLQ pain    INTERVAL HISTORY/HPI:    Pt unchanged vs yesterday:  Overall feels better but still w/ intermittent LLQ pain. Taking full liquids ok, hungry for solid food, still feels bloated/distended. Had small BM this AM but no flatus. OBJECTIVE:  VITALS:  /74   Pulse 90   Temp 98.2 °F (36.8 °C) (Oral)   Resp 16   Ht 6' 1\" (1.854 m)   Wt 148 lb 11.2 oz (67.4 kg)   SpO2 95%   BMI 19.62 kg/m²     INTAKE/OUTPUT:    I/O last 3 completed shifts: In: 1 [P.O.:960; I.V.:10]  Out: 350 [Urine:350]  No intake/output data recorded. CONSTITUTIONAL:  awake and alert  LUNGS:     ABDOMEN:  hypoactive bowel sounds, soft, distended, tenderness noted in the left lower quadrant     Data:  CBC:   Recent Labs     08/10/20  0530 08/11/20  0605 08/12/20  0615   WBC 8.0 8.4 9.9   HGB 11.4* 10.9* 10.7*   HCT 34.7* 33.0* 32.3*   * 102* 105*     BMP:    Recent Labs     08/10/20  0530 08/11/20  0605 08/12/20 0615   * 137 132*   K 4.9 5.5* 5.4*    105 101   CO2 23 23 23   BUN 13 22* 28*   CREATININE 0.9 1.4* 1.5*   GLUCOSE 122* 147* 115*     Hepatic: No results for input(s): AST, ALT, ALB, BILITOT, ALKPHOS in the last 72 hours. Mag:    No results for input(s): MG in the last 72 hours. Phos:   No results for input(s): PHOS in the last 72 hours. INR: No results for input(s): INR in the last 72 hours.     Radiology Review:         ASSESSMENT AND PLAN:  78 yo with pneumoperitoneum of uncertain etiology, at this point presumed to be due to sigmoid colon process (ie perforated diverticulitis)   - will trial low fiber diet   - AXR today to assess for persistent distention   - will consider repeat CT in 1-2 days to compare to last week   - cont IV abx   - up OOB to chair    Electronically signed by Kelly Salamanca MD

## 2020-08-13 NOTE — PROGRESS NOTES
Physical Therapy  Facility/Department: [facility] - MED SURG  Daily Treatment Note  NAME: Sara Murray  : 1945  MRN: [id number]    Date of Service: 2020    Discharge Recommendations:  IP Rehab   PT Equipment Recommendations  Equipment Needed: (defer to facility. RW for home if pt declines rehab)  Mobility Devices: Kip Laclede: Rolling  If pt is unable to be seen after this session, please let this note serve as discharge summary. Please see case management note for discharge disposition. Thank you. Assessment   Body structures, Functions, Activity limitations: Decreased functional mobility ; Decreased balance;Decreased safe awareness;Decreased endurance;Decreased strength;Decreased posture  Assessment: Pt with decreased mobility and strength this date. Increased effort for sit to stand and decreased ambulation distance. Pt reports feeling more fatigue and harder to move. Motivated to participate to improve overall mobility as pt was previously indep. Pt would benefit from IP rehab due to decline in strength and mobility. Pt lives on he [place]nd level and wife reports pt has been sleeping in a recliner on the first level and showering outside to avoid stairs. Pt said he would think about rehab. Treatment Diagnosis: Decreased independence with functional mobility. Specific instructions for Next Treatment: Progress ther ex and mobility as tolerated  Prognosis: Good  Decision Making: Medium Complexity  PT Education: Goals;Precautions; Functional Mobility Training;PT Role;Transfer Training;Pressure Relief;Plan of Care;Equipment;Gait Training;Home Exercise Program;General Safety; Disease Specific Education; Energy Conservation  Patient Education: Disease-specific education: Patient educated on the importance of out of bed activity and pressure relief. Educated on importance of utilizing RW to help decrease his fall risk. Patient verbalized understanding.   Barriers to Learning: none  REQUIRES PT FOLLOW UP: and heavy steps. Heavy reliance on RW for support. . Flexed posture and increased WOB  Distance: 60'     Balance  Posture: Good  Sitting - Static: Good  Sitting - Dynamic: Good  Standing - Static: Fair  Standing - Dynamic: Fair  Exercises  Gluteal Sets: x 15 bilat  Hip Flexion: seated marching x 10 BLE  Knee Long Arc Quad: x 15 BLE  Ankle Pumps: x 15 BLE      Strength RLE  Comment: 3+/5  Strength LLE  Comment: 3+/5   AM-PAC Score  AM-PAC Inpatient Mobility Raw Score : 15 (08/13/20 1201)  AM-PAC Inpatient T-Scale Score : 39.45 (08/13/20 1201)  Mobility Inpatient CMS 0-100% Score: 57.7 (08/13/20 1201)  Mobility Inpatient CMS G-Code Modifier : CK (08/13/20 1201)          Goals  Short term goals  Time Frame for Short term goals: 1 week, 8/15/20  Short term goal 1: Supine <> sit with mod I. Short term goal 2: Sit <> stand with supervision. 8/13: min A  Short term goal 3: Bed <> chair with supervision. Short term goal 4: Ambulate 150 feet with LRAD and supervision. 8/13: 61' with RW with Min A  Short term goal 5: Ascend 4 steps with rail and supervision. 8/13: not tested due to decline in strength  Patient Goals   Patient goals : \"To feel better. To be stronger and walk farther. \"    Plan    Plan  Times per week: 3-5x/week in acute care  Plan weeks: 1 week, 8/15/20  Specific instructions for Next Treatment: Progress ther ex and mobility as tolerated  Current Treatment Recommendations: Strengthening, Transfer Training, Endurance Training, Patient/Caregiver Education & Training, Balance Training, Gait Training, Home Exercise Program, Safety Education & Training, ADL/Self-care Training, Pain Management, Equipment Evaluation, Education, & procurement, Positioning, Stair training, Functional Mobility Training  Safety Devices  Type of devices:  All fall risk precautions in place, Gait belt, Patient at risk for falls, Nurse notified, Call light within reach, Left in chair, Chair alarm in place  Restraints  Initially in place: No

## 2020-08-13 NOTE — PROGRESS NOTES
[facility]    Follow up Note           Reason for Consult:  TAZ, Hyperkalemia  Requesting Physician:  Dr. Lonnie Love history  Pt c/o more abd pain, nausea this afternoon  Small BMs    Last 24 h uop 1l    ROS: No chest pain/shortness of breath/fever/nausea  PSFH: No visitor    Scheduled Meds:   tamsulosin  0.4 mg Oral Daily    pantoprazole  40 mg Intravenous BID    piperacillin-tazobactam  3.375 g Intravenous Q6H    tiotropium  2 puff Inhalation Daily    dilTIAZem  120 mg Oral Daily    sodium chloride flush  10 mL Intravenous 2 times per day    enoxaparin  40 mg Subcutaneous Daily    methylPREDNISolone  30 mg Intravenous Q12H     Continuous Infusions:   sodium chloride 100 mL/hr at 08/13/20 1344     PRN Meds:.potassium chloride, hydrOXYzine, sodium chloride flush, acetaminophen **OR** acetaminophen, polyethylene glycol, promethazine **OR** ondansetron, HYDROmorphone, albuterol sulfate HFA, mineral oil-hydrophilic petrolatum      History of Present Illness on 8/12/20:    77 y.o. yo male with PMH of Mantle cell NHL on experimental drug until 7/14/20 which was discontinued d/t exfoliative dermatis and was treated with steroids, anasarca, COPF who is admitted for bowel perforation on 8/4/20 which was managed medically.  He has improved and had BM on 8/12 and was started on solids   On 8/8 he received iv contrast during CT scan  Pt reports of frequent urination and has condom catheter in place  Was on Po kcl until 8/10  Nephrology consulted for TAZ and hyperkalemia  No recent chemo      Physical exam:   Constitutional:  VITALS:  /68   Pulse 107   Temp 97.7 °F (36.5 °C) (Oral)   Resp 16   Ht 6' 1\" (1.854 m)   Wt 148 lb 11.2 oz (67.4 kg)   SpO2 97%   BMI 19.62 kg/m²   Gen: alert, awake, nad  Skin: no rash, turgor wnl  Heent:  eomi, mmm  Neck: no bruits or jvd noted, thyroid normal  Cardiovascular:  S1, S2 without m/r/g  Respiratory: CTA B without w/r/r; respiratory effort normal  Abdomen:  +bs, soft, nt, distended, no hepatosplenomegaly  Ext: 1+ lower extremity edema  Neuro/Psy: AAoriented times 3 ; moves all 4 ext  Musculoskeletal:  Rom, muscular strength intact; digits, nails normal    Data/  Recent Labs     08/11/20  0605 08/12/20  0615 08/13/20  0520   WBC 8.4 9.9 7.3   HGB 10.9* 10.7* 10.2*   HCT 33.0* 32.3* 30.7*   MCV 90.7 90.8 90.3   * 105* 88*     Recent Labs     08/12/20  0615 08/12/20  1338 08/13/20  0520   * 134* 136   K 5.4* 5.8* 5.2*    101 104   CO2 23 22 23   GLUCOSE 115* 169* 127*   PHOS  --  3.3  --    BUN 28* 30* 32*   CREATININE 1.5* 1.4* 1.4*   LABGLOM 46* 49* 49*   GFRAA 55* 60* 60*     UA w micro:  Protein 30 otherwise bland  Urine na 35    Assessment  -TAZ  In the setting of limited Po and volume depletion. No evidence of TLS  -Bowel perf  medical treatment   On medical treatment  -Mantle cell lymphoma  -edema likley from hypoalbuminemia    Plan  -change IVF to NS at 100 ml/h   Avoid iv contrast as far as possible  -walker catheter for PVR >200 ml  -Serial renal panel  -daily wts and strict i/o  -renal dose medications   -avoid nephrotoxins        Thank you for the consultation. Please do not hesitate to call with questions.     Irene Lima  [facility]  Office: [phone]  Fax:    [phone]

## 2020-08-13 NOTE — PROGRESS NOTES
[facility] SURGERY    PATIENT NAME: Claudia Palm     TODAY'S DATE: 8/13/2020    CHIEF COMPLAINT: LLQ pain    INTERVAL HISTORY/HPI:    Pt unchanged vs yesterday. Eating solid food. Having some small stools but denies flatus. Still feels bloated w/ L-sided pain. OBJECTIVE:  VITALS:  /68   Pulse 107   Temp 97.7 °F (36.5 °C) (Oral)   Resp 16   Ht 6' 1\" (1.854 m)   Wt 148 lb 11.2 oz (67.4 kg)   SpO2 97%   BMI 19.62 kg/m²     INTAKE/OUTPUT:    I/O last 3 completed shifts: In: 1864.3 [P.O.:280; I.V.:1584.3]  Out: 3349 [KUAOF:6263]  I/O this shift: In: 5 [P.O.:420]  Out: 0     CONSTITUTIONAL:  awake and alert  LUNGS:     ABDOMEN:   , firm, distended, tenderness noted in the left lower quadrant and is mild, no rebound/guard     Data:  CBC:   Recent Labs     08/11/20  0605 08/12/20  0615 08/13/20  0520   WBC 8.4 9.9 7.3   HGB 10.9* 10.7* 10.2*   HCT 33.0* 32.3* 30.7*   * 105* 88*     BMP:    Recent Labs     08/12/20  0615 08/12/20  1338 08/13/20  0520   * 134* 136   K 5.4* 5.8* 5.2*    101 104   CO2 23 22 23   BUN 28* 30* 32*   CREATININE 1.5* 1.4* 1.4*   GLUCOSE 115* 169* 127*     Hepatic: No results for input(s): AST, ALT, ALB, BILITOT, ALKPHOS in the last 72 hours. Mag:    No results for input(s): MG in the last 72 hours. Phos:     Recent Labs     08/12/20  1338   PHOS 3.3      INR: No results for input(s): INR in the last 72 hours. Radiology Review:         ASSESSMENT AND PLAN:  Recent pneumoperitoneum/perforated viscus of unclear etiology.   Has not fully improved over past few days - remains distended w/ pain, but bowels appear to be functioning   - repeat CT abd/pel w/ PO/IV to reassess     - will follow    Electronically signed by Keith Fontenot MD

## 2020-08-13 NOTE — PROGRESS NOTES
ONCOLOGY HEMATOLOGY CARE PROGRESS NOTE      SUBJECTIVE:     The patient states that other than fatigue, he is doing relatively well. He denies abdominal pain. He feels that his rash is stable. ROS:   The remaining 10 point review of symptoms is unremarkable. OBJECTIVE        Physical    VITALS:  /72   Pulse 91   Temp 97.7 °F (36.5 °C) (Oral)   Resp 14   Ht 6' 1\" (1.854 m)   Wt 148 lb 11.2 oz (67.4 kg)   SpO2 94%   BMI 19.62 kg/m²   TEMPERATURE:  Current - Temp: 97.7 °F (36.5 °C); Max - Temp  Av.9 °F (36.6 °C)  Min: 97.7 °F (36.5 °C)  Max: 98.4 °F (36.9 °C)  PULSE OXIMETRY RANGE: SpO2  Av.1 %  Min: 93 %  Max: 97 %  24HR INTAKE/OUTPUT:      Intake/Output Summary (Last 24 hours) at 2020 0853  Last data filed at 2020 0533  Gross per 24 hour   Intake 1864.33 ml   Output 1025 ml   Net 839.33 ml       CONSTITUTIONAL:  awake, alert, cooperative, no apparent distress, HEENT oral pharynx , no scleral icterus  HEMATOLOGIC/LYMPHATICS:  no cervical lymphadenopathy, no supraclavicular lymphadenopathy, no axillary lymphadenopathy and no inguinal lymphadenopathy  LUNGS:  No increased work of breathing, good air exchange, clear to auscultation bilaterally, no crackles or wheezing  CARDIOVASCULAR:  , regular rate and rhythm, normal S1 and S2, no S3 or S4, and no murmur noted  ABDOMEN:  No scars, normal bowel sounds, soft, non-distended, non-tender, no masses palpated, no hepatosplenomegally  MUSCULOSKELETAL:  There is no redness, warmth, or swelling of the joints. EXTREMETIES: The patient feels that his edema has improved. NEUROLOGIC:  Awake, alert, oriented to name, place and time. Cranial nerves II-XII are grossly intact. Motor is 5 out of 5 bilaterally.    SKIN:  Generalized erythema      Data      Recent Labs     20  0605 20  0615 20  0520   WBC 8.4 9.9 7.3   HGB 10.9* 10.7* 10.2*   HCT 33.0* 32.3* 30.7*   * 105* 88*   MCV 90.7 90.8 90.3        Recent Labs     08/12/20  0615 08/12/20  1338 08/13/20  0520   * 134* 136   K 5.4* 5.8* 5.2*    101 104   CO2 23 22 23   PHOS  --  3.3  --    BUN 28* 30* 32*   CREATININE 1.5* 1.4* 1.4*     No results for input(s): AST, ALT, ALB, BILIDIR, BILITOT, ALKPHOS in the last 72 hours. Magnesium:    Lab Results   Component Value Date    MG 1.90 08/08/2020    MG 1.60 08/07/2020    MG 1.20 08/05/2020     CT A/P 8/7/2020: Moderate amount of pneumoperitoneum, similar to prior. Tomas Madison is a small    linear tract of gas marginating the anterior pyloric region of the stomach    anteriorly, which could suggest site of perforation.         Focal collection of fluid and gas marginates the sigmoid colon in the pelvis,    suggesting early pericolonic abscess from perforated diverticulitis.  There    is wall thickening of the sigmoid colon in the pelvis with surrounding    inflammatory change.  Given the lack of significant free air in the pelvis,    this is not likely to be the source of the perforation.         Persistent small bowel wall thickening in the pelvis, likely reactive due to    the underlying pelvic ascites. 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  - Recently status post treatment with 3rd-line [id number] in the context of a clinical trial between 10/30/2019 - 7/14/2020.   - A recent CT scan on 7/31/2020

## 2020-08-13 NOTE — PROGRESS NOTES
anterior pyloric region of the stomach   anteriorly, which could suggest site of perforation. Focal collection of fluid and gas marginates the sigmoid colon in the pelvis,   suggesting early pericolonic abscess from perforated diverticulitis. There   is wall thickening of the sigmoid colon in the pelvis with surrounding   inflammatory change. Given the lack of significant free air in the pelvis,   this is not likely to be the source of the perforation. Persistent small bowel wall thickening in the pelvis, likely reactive due to   the underlying pelvic ascites. CT ABDOMEN PELVIS WO CONTRAST Additional Contrast? Oral   Final Result   No extraluminal oral contrast to indicate a site of gastrointestinal   perforation         CT ABDOMEN PELVIS W IV CONTRAST Additional Contrast? None   Final Result   Addendum 1 of 1   ADDENDUM:   These results were communicated by telephone to Dr. Thania Mckeon at 11:06 a.m.    on   08/04/2020         Final              Assessment/Plan:    Active Hospital Problems    Diagnosis    Pneumoperitoneum [K66.8]    Abdominal pain, left lower quadrant [R10.32]     abd pain- with Pneumoperitoneum noted on CT  -gen surg consulted, apprec mgmt,  attempted to tx nonoperatively  -Tele  -Npo except meds/ice chips initially, advanced as tolerated  -Prn iv dilaudid ordered  IV zosyn started 8/4 and continued  -Gentle ivfs ordered   -repeat CT 8/7 noted moderate pneumoperitoneum(similar to prior, ?stomach as site of perforation, ?early pericolonic abscess)   -started on ppi ggt  -UGI planned for 8/10, it was normal.  We will continue to follow along with surgery. Normocytic Anemia-stable  -monitored labs     Anasarca- due to steroids?   -held diuretics for now given above     afib- controlled, unclear why not on AC  -continued on dilt     Anxiety -on atarax prn     Asthma- per emr  On incruse(sub spiriva here) and albuterol     Drug rash -ongoing, attempted to wean as outpt but did not tolerate, related to recent chemo for NHL  -Switched po steroids to iv soluemdrol q12, mgmt per hemon     NHL- mgt per hemonc  -held home megace    TAZ, likely prerenal, started on IV hydration, monitor closely, oncology following to rule out tumor lysis syndrome.   Continue to monitor.       DVT Prophylaxis: lovenox  Diet: Dietary Nutrition Supplements: Standard High Calorie Oral Supplement  DIET LOW FIBER;  Code Status: Full Code    PT/OT Eval Status: not ordered    Dispo -3 to 5 days    Irma Chandra MD

## 2020-08-13 NOTE — CARE COORDINATION
8/13/20 [facility] following, pt wants to discuss IPR recs with his wife, will follow. Pt not medically ready for d/c. Watch for antibiotic needs if pt goes home.

## 2020-08-13 NOTE — PROGRESS NOTES
Per spouse, pt is too fatigue at this moment to participate in therapy. Will follow up at later time as schedule allows.        Arturo Solorzano OTR/L

## 2020-08-14 NOTE — PROGRESS NOTES
Surgeon in to see patient. States he will be going to the OR this afternoon. Patient verbalized understanding.

## 2020-08-14 NOTE — PROGRESS NOTES
[facility]    PATIENT NAME: Jose Cordova     TODAY'S DATE: 8/14/2020    CHIEF COMPLAINT: abd pain    INTERVAL HISTORY/HPI:    Pt with worse pain and nausea, emesis, chills. REVIEW OF SYSTEMS:  Pertinent positives and negatives as per interval history section    OBJECTIVE:  VITALS:  /66   Pulse 99   Temp 98.2 °F (36.8 °C) (Oral)   Resp 16   Ht 6' 1\" (1.854 m)   Wt 150 lb 9.6 oz (68.3 kg)   SpO2 94%   BMI 19.87 kg/m²     INTAKE/OUTPUT:    I/O last 3 completed shifts: In: 4158.3 [P.O.:1060; I.V.:3098.3]  Out: 725 [Urine:725]  No intake/output data recorded. CONSTITUTIONAL:  awake and alert  LUNGS:  Respirations easy and unlabored, no crackles or wheezing  CARD:  regular rate and rhythm  ABDOMEN:  hypoactive bowel sounds, soft, distended, tenderness noted diffusely     Data:  CBC:   Recent Labs     08/12/20  0615 08/13/20  0520 08/14/20  0545   WBC 9.9 7.3 5.6   HGB 10.7* 10.2* 10.5*   HCT 32.3* 30.7* 31.6*   * 88* 68*     BMP:    Recent Labs     08/12/20  1338 08/13/20  0520 08/14/20  0545   * 136 133*   K 5.8* 5.2* 5.1    104 101   CO2 22 23 22   BUN 30* 32* 39*   CREATININE 1.4* 1.4* 1.4*   GLUCOSE 169* 127* 119*     Hepatic: No results for input(s): AST, ALT, ALB, BILITOT, ALKPHOS in the last 72 hours. Mag:    No results for input(s): MG in the last 72 hours. Phos:     Recent Labs     08/12/20  1338   PHOS 3.3      INR:   Recent Labs     08/14/20  0737   INR 1.03       Radiology Review:  *Imaging personally reviewed by me. CT -   FINDINGS:   Lower Chest: Small bilateral pleural effusions.  Moderate emphysematous   changes.  Coronary artery calcifications. Organs: Scattered hepatic hypodensities are again noted.  No suspicious   abnormality is seen.  The pancreas, spleen and adrenal glands are   unremarkable.  The kidneys enhance symmetrically.  5 mm right renal cyst.  No   follow-up recommended. GI/Bowel: There is a moderate-sized hiatal hernia.  Oral contrast has been   administered.  The stomach is mildly distended.  Pre pyloric pneumoperitoneum   is no longer seen. Buddy Hearing is a generalized ileus, with oral contrast   progressing to the level of the rectum.  There is mural thickening of the   sigmoid colon.  Although no leakage is present, in the right recto vesicular   space a fluid collection with dependent (oral) contrast has developed,   measuring 11 x 11.5 cm.  It is in close proximity to pelvic loops of small   bowel which are unopacified, possibly with pneumatosis. Mesenteric vasculature enhances in normal fashion.  No bowel lacking   submucosal enhancement is appreciated. Pelvis: Pelvic fluid collection is noted above.  Henson catheter is in the   urinary bladder, which is decompressed. Peritoneum/Retroperitoneum: Moderate to large amount of pneumoperitoneum is   noted.  Most air is collected at anterior to the liver. Buddy Hearing is a small to   moderate amount of ascites with speckled free air     Bones/Soft Tissues: Moderate L2 and moderate to severe L3 and L5 anterior   compression fractures are again noted.  Intramedullary windy in the right femur   is unremarkable.  Anasarca. Impression:      Increased pneumoperitoneum, source uncertain. A 11.5 cm fluid collection has developed in the pelvis with internal   contrast, likely oral contrast.  It is in proximity to pelvic small bowel   loops, with possible pneumatosis. Small bilateral pleural effusions.  Anasarca. ASSESSMENT AND PLAN:  76 yo with bowel perforation  1. Worsened exam and labs/imaging  2. Plan for platelet transfusion and PICC placement  3. Discussed need for OR today. Possible locations of perforation discussed - could be peptic ulcer disease or colonic in origin based on prior imaging. Possible intervention including bowel resection, feeding tube and colostomy discussed.       Electronically signed by Steven Webster, [address]

## 2020-08-14 NOTE — PROCEDURES
Procedure: Emergent endotracheal intubation     Indication: Acute respiratory failure, septic shock, perforated bowel     Procedure details: Initially the patient was to go to the OR and get intubated in the OR. I was requested by Dr. Karol Alberts to intubate the patient prior to going to the OR due to deteriorating respiratory status. The family had already been informed about this. He was on supplemental oxygen. He was already lethargic. Sedation consisted of Versed 5 mg, fentanyl 10 mg. Once sedated, a glide scope was placed, the cords were easily visualized and the 8 mm endotracheal tube was placed in 1 attempt, anchored at 24 cm. Position was confirmed by standard criteria including color change. The patient was placed on mechanical ventilation, chest x-ray will be ordered. Blood loss 0 mL. The patient tolerated the procedure well.

## 2020-08-14 NOTE — PROGRESS NOTES
Patient with increased respirations decreased O2 sat. BP lower. MD paged and Pulmonology consult placed. Dr. Jaimee Mack paged and NP to bedside to evaluate patient.

## 2020-08-14 NOTE — ANESTHESIA POSTPROCEDURE EVALUATION
Department of Anesthesiology  Postprocedure Note    Patient: Cinthya Chacon  MRN: [id number]  YOB: 1945  Date of evaluation: 8/14/2020  Time:  6:20 PM     Procedure Summary     Date:  08/14/20 Room / Location:  [address] [place] [address]    Anesthesia Start:  5698 Anesthesia Stop:  7989    Procedure:  LAPAROTOMY, SIGMOID COLECTOMY; SMALL BOWEL RESECTION, COLOSTOMY; VASCATH (N/A Abdomen) Diagnosis:       Bowel perforation ([address]      (BOWEL PERFORATION   K63.1)    Surgeon:  Damian Flores MD Responsible Provider:  Lashawn Collazo MD    Anesthesia Type:  general ASA Status:  4 - Emergent          Anesthesia Type: No value filed. Yoli Phase I:      Yoli Phase II:      Last vitals: Reviewed and per EMR flowsheets.        Anesthesia Post Evaluation    Patient location during evaluation: ICU  Level of consciousness: sedated and ventilated  Airway patency: patent  Nausea & Vomiting: no nausea  Complications: no  Cardiovascular status: hemodynamically stable  Respiratory status: ventilator  Hydration status: euvolemic

## 2020-08-14 NOTE — ANESTHESIA PROCEDURE NOTES
Arterial Line:    An arterial line was placed using surface landmarks, in the OR for the following indication(s): continuous blood pressure monitoring and blood sampling needed. A 20 gauge (size), 1 and 3/8 inch (length), Arrow (type) catheter was placed, Seldinger technique not used, into theradial artery, secured by tape and Tegaderm. Anesthesia type: Local and General    Events:  patient tolerated procedure well with no complications and EBL 0mL. 8/14/2020 4:04 PM  Anesthesiologist: Malvin Doe MD  Preanesthetic Checklist  Completed: patient identified, site marked, surgical consent, pre-op evaluation, timeout performed, IV checked, risks and benefits discussed, monitors and equipment checked, anesthesia consent given, oxygen available and patient being monitored

## 2020-08-14 NOTE — ANESTHESIA PRE PROCEDURE
Department of Anesthesiology  Preprocedure Note       Name:  Jomar Chavira   Age:  77 y.o.  :  1945                                          MRN:  [id number]         Date:  2020      Surgeon: Megha Porter):  Mary Kate Merino MD    Procedure: Procedure(s):  LAPAROTOMY, POSSIBLE COLOSTOMY    Medications prior to admission:   Prior to Admission medications    Medication Sig Start Date End Date Taking?  Authorizing Provider   predniSONE (DELTASONE) 20 MG tablet Take 20 mg by mouth daily Three pills qd   Yes Historical Provider, MD   pantoprazole (PROTONIX) 20 MG tablet Take 20 mg by mouth daily   Yes Historical Provider, MD   INCRUSE ELLIPTA 62.5 MCG/INH AEPB INHALE 1 PUFF INTO THE LUNGS DAILY 20  Yes Mitchel Logan MD   diltiazem (CARTIA XT) 120 MG extended release capsule Take 1 capsule by mouth daily 19  Yes ANAID Fagan - CNP   albuterol sulfate HFA (PROAIR HFA) 108 (90 Base) MCG/ACT inhaler Inhale 2 puffs into the lungs every 6 hours as needed for Wheezing or Shortness of Breath 19  Yes Mitchel Logan MD   megestrol (MEGACE) 40 MG tablet Take 200 mg by mouth 2 times daily   Yes Historical Provider, MD       Current medications:    Current Facility-Administered Medications   Medication Dose Route Frequency Provider Last Rate Last Dose    sodium chloride flush 0.9 % injection 10 mL  10 mL Intravenous 2 times per day Mary Kate Merino MD   10 mL at 20 1154    sodium chloride flush 0.9 % injection 10 mL  10 mL Intravenous PRN Mary Kate Merino MD        PN-Adult Premix  - Standard Electrolytes - Central Line   Intravenous Continuous TPN Mary Kate Merino MD        fat emulsion 20 % infusion 250 mL  250 mL Intravenous Once per day on Mon Thu Mary Kate Merino MD        meropenem [facility]) 1 g in sodium chloride 0.9 % 100 mL IVPB  1 g Intravenous Q12H Mary Kate Merino  mL/hr at 20 1158 1 g at 20 1158    HYDROmorphone (DILAUDID) injection 0.5 mg  0.5 mg Intravenous Q4H PRN Darcie Gallegos MD   0.5 mg at 08/14/20 0811    albuterol sulfate  (90 Base) MCG/ACT inhaler 2 puff  2 puff Inhalation Q4H PRN Darcie Gallegos MD        mineral oil-hydrophilic petrolatum (AQUAPHOR) ointment   Topical BID PRN Darcie Gallegos MD           Allergies:     Allergies   Allergen Reactions    Latex Rash    Adhesive Tape      Rash       Problem List:    Patient Active Problem List   Diagnosis Code    Tobacco abuse disorder Z72.0    Primary spontaneous pneumothorax J93.11    COPD, severe ([address] J44.9    Autologous bone marrow transplantation Z94.81    Mantle cell lymphoma of lymph nodes of multiple sites ([address] C83.18    Encounter for long-term (current) use of high-risk medication Z79.899    Chest pain R07.9    Spontaneous pneumothorax J93.83    PAF (paroxysmal atrial fibrillation) (HCC) I48.0    Typical atrial flutter (HCC) I48.3    Cigarette smoker F17.210    Dermatitis L30.9    Pneumoperitoneum K66.8    Abdominal pain, left lower quadrant R10.32       Past Medical History:        Diagnosis Date    Blood transfusion     Cancer ([address]     basal and squamous cell skin ca    COPD, mild ([address]     Dermatitis 7/14/2020    Disease of blood and blood forming organ     History of blood transfusion     Medical history reviewed with no changes     Non Hodgkin's lymphoma ([address] 12/6/12    chemo started 12/26/12x6, then stem cell tx    Pneumonia     lymphoma, pneumo history    Pneumothorax Dec. 2012    Spontaneous pneumothorax     x 3 last 11/3/2011       Past Surgical History:        Procedure Laterality Date    CHEST TUBE INSERTION      x3    COLONOSCOPY  4/9    polyps q 5 yrs    COLONOSCOPY  5/15/13    FEMUR SURGERY      r/t fracture with retained hardware    FRACTURE SURGERY  1960    broken arm    FRACTURE SURGERY  2003    femur    LYMPH NODE BIOPSY  12/11/12    right inguinal lymph node excision and biopsy    MIDDLE EAR SURGERY      OTHER SURGICAL HISTORY  6/7/12    stem cell transplant    SKIN BIOPSY      SKIN CANCER DESTRUCTION      STOMACH SURGERY      at age 7 weeks    THORACOSCOPY Left 01/08/2018    video assisted thoracoscopy,left upper lobe wedge resection;mechanical and chemical pleurodesis    TUNNELED VENOUS PORT PLACEMENT         Social History:    Social History     Tobacco Use    Smoking status: Current Every Day Smoker     Packs/day: 0.50     Years: 50.00     Pack years: 25.00     Types: Cigarettes    Smokeless tobacco: Never Used    Tobacco comment: 1-2 cigs daily - 3/5/20   Substance Use Topics    Alcohol use: Yes     Alcohol/week: 0.0 standard drinks     Comment: 8 pack beer weekly                                Ready to quit: Not Answered  Counseling given: Not Answered  Comment: 1-2 cigs daily - 3/5/20      Vital Signs (Current):   Vitals:    08/14/20 0738 08/14/20 0853 08/14/20 0855 08/14/20 1145   BP: 101/66   104/66   Pulse: 99   100   Resp: 16   17   Temp: 98.2 °F (36.8 °C)   97.6 °F (36.4 °C)   TempSrc: Oral   Axillary   SpO2: 95% (!) 88% 94% 95%   Weight:       Height:                                                  BP Readings from Last 3 Encounters:   08/14/20 104/66   07/20/20 136/73   03/05/20 118/70       NPO Status:                                                                                 BMI:   Wt Readings from Last 3 Encounters:   08/14/20 150 lb 9.6 oz (68.3 kg)   07/17/20 146 lb 14.4 oz (66.6 kg)   03/05/20 154 lb 12.8 oz (70.2 kg)     Body mass index is 19.87 kg/m².     CBC:   Lab Results   Component Value Date    WBC 5.6 08/14/2020    RBC 3.51 08/14/2020    RBC 4.12 07/07/2017    HGB 10.5 08/14/2020    HCT 31.6 08/14/2020    MCV 90.0 08/14/2020    RDW 17.8 08/14/2020    PLT 68 08/14/2020       CMP:   Lab Results   Component Value Date     08/14/2020    K 5.1 08/14/2020     08/14/2020    CO2 22 08/14/2020    BUN 39 08/14/2020    CREATININE 1.4 08/14/2020    GFRAA 60 08/14/2020    GFRAA 157 06/15/2013    AGRATIO 1.4 08/04/2020    LABGLOM 49 08/14/2020    GLUCOSE 119 08/14/2020    GLUCOSE 146 07/07/2017    PROT 4.4 08/04/2020    PROT 5.8 07/07/2017    CALCIUM 7.6 08/14/2020    BILITOT 0.6 08/04/2020    ALKPHOS 69 08/04/2020    AST 11 08/04/2020    ALT 16 08/04/2020       POC Tests: No results for input(s): POCGLU, POCNA, POCK, POCCL, POCBUN, POCHEMO, POCHCT in the last 72 hours. Coags:   Lab Results   Component Value Date    PROTIME 11.5 08/14/2020    INR 0.99 08/14/2020    APTT 27.2 08/14/2020       HCG (If Applicable): No results found for: PREGTESTUR, PREGSERUM, HCG, HCGQUANT     ABGs: No results found for: PHART, PO2ART, ZWQ6CAE, WGW3DKD, BEART, R5XWDPZM     Type & Screen (If Applicable):  Lab Results   Component Value Date    LABABO O 06/07/2013    LABRH Positive 06/07/2013       Drug/Infectious Status (If Applicable):  No results found for: HIV, HEPCAB    COVID-19 Screening (If Applicable): No results found for: COVID19      Anesthesia Evaluation  Patient summary reviewed and Nursing notes reviewed no history of anesthetic complications:   Airway: Mallampati: II  TM distance: >3 FB   Neck ROM: full  Mouth opening: > = 3 FB Dental:          Pulmonary:   (+) pneumonia:  COPD:  current smoker                           Cardiovascular:    (+) dysrhythmias: atrial fibrillation,                   Neuro/Psych:   Negative Neuro/Psych ROS              GI/Hepatic/Renal: Neg GI/Hepatic/Renal ROS       (-) GERD, liver disease and no renal disease       Endo/Other: Negative Endo/Other ROS   (+) malignancy/cancer (lymphoma). (-) diabetes mellitus               Abdominal:           Vascular: negative vascular ROS. Anesthesia Plan      general     ASA 4 - emergent     (I discussed with the patient the risks and benefits of PIV, general anesthesia, IV Narcotics, PACU.  All questions were answered the patient agrees with the

## 2020-08-14 NOTE — PROGRESS NOTES
ONCOLOGY HEMATOLOGY CARE PROGRESS NOTE      SUBJECTIVE:     Patient going back to the OR today. Plt dropping now in the 60s. He is having worsening abd pain. CT with worsening pneumoperitoneum. ROS:   The remaining 10 point review of symptoms is unremarkable. OBJECTIVE        Physical    VITALS:  /66   Pulse 99   Temp 98.2 °F (36.8 °C) (Oral)   Resp 16   Ht 6' 1\" (1.854 m)   Wt 150 lb 9.6 oz (68.3 kg)   SpO2 94%   BMI 19.87 kg/m²   TEMPERATURE:  Current - Temp: 98.2 °F (36.8 °C); Max - Temp  Av °F (36.7 °C)  Min: 97.7 °F (36.5 °C)  Max: 98.2 °F (36.8 °C)  PULSE OXIMETRY RANGE: SpO2  Av.4 %  Min: 88 %  Max: 99 %  24HR INTAKE/OUTPUT:      Intake/Output Summary (Last 24 hours) at 2020 1118  Last data filed at 2020 0404  Gross per 24 hour   Intake 3738.33 ml   Output 725 ml   Net 3013.33 ml       CONSTITUTIONAL:  awake, alert, cooperative, no apparent distress, HEENT oral pharynx , no scleral icterus  HEMATOLOGIC/LYMPHATICS:  no cervical lymphadenopathy, no supraclavicular lymphadenopathy, no axillary lymphadenopathy and no inguinal lymphadenopathy  LUNGS:  No increased work of breathing, good air exchange, clear to auscultation bilaterally, no crackles or wheezing  CARDIOVASCULAR:  , regular rate and rhythm, normal S1 and S2, no S3 or S4, and no murmur noted  ABDOMEN:  Firm, tender, distended   MUSCULOSKELETAL:  There is no redness, warmth, or swelling of the joints. EXTREMETIES: The patient feels that his edema has improved. NEUROLOGIC:  Awake, alert, oriented to name, place and time. Cranial nerves II-XII are grossly intact. Motor is 5 out of 5 bilaterally.    SKIN:  Generalized erythema- nearly resolved       Data      Recent Labs     20  0615 20  0520 20  0545   WBC 9.9 7.3 5.6   HGB 10.7* 10.2* 10.5*   HCT 32.3* 30.7* 31.6*   * 88* 68*   MCV 90.8 90.3 90.0        Recent Labs     20  1338 20  0520

## 2020-08-14 NOTE — OP NOTE
Date of Surgery: 8/14/20    Preop Dx: Bowel Perforation    Postop Dx:  Perforated Sigmoid Colon    Procedure:  Mari's Procedure, Small Bowel Resection, Right Internal Jugular Temporary Dialysis Catheter Insertion    Surgeon:  Mariah Edwards    Assistant:      Anesthesia:  GETA    EBL:   100ml    Specimen:  Sigmoid colon, small intestine    Complications: none    Drains/Lines:  15F channel drain    Indications:  78 yo with bowel perforation with worsening pain and hemodynamics. Description:       Patient was given adequate description of the risks and rewards of the procedure, including bleeding, infection, injury to surrounding structures, need for further operations and freely consented. He was given appropriate antibiotics and brought to the OR where GETA anesthesia was induced. He was placed in supine position. Prepped and draped in usual sterile fashion. Incision was made from symphysis pubis to just above the umbilicus in the midline. The incision was carried down through the dermis, subcutaneous fat, and linea alba using electrocautery. Preperitoneal fat was incised using electrocautery. Peritoneum was grasped with pickups. Small incision was made in the peritoneal cavity. The preperitoneal fat and peritoneum were then incised along the length of the incision. A large amount of purulent ascites was encountered and suctioned free. The abdominal cavity was then explored, beginning with the stomach and duodenum. NG tube was felt to be in adequate position. The left lobe and right lobe of the liver were palpated and felt to be normal. The right colon, transverse colon, and descending colon were palpated and patient was found to have significant inflammation involving the sigmoid colon. The mid sigmoid colon was the site of the perforation. A loop of small intestine was also densely adherent here and inflamed. It was mobilized bluntly. Attention was then turned to the sigmoid colon.  The lateral attachments of the colon were freed up using electrocautery along the white line of Toldt. Left ureter was identified in the left retroperitoneal space and not injured. A site was chosen in the mid descending colon, free of inflammation or diverticulum, as the proximal margin. The mesentery was scored with electrocautery and using a blue load on the ARACELIS stapler, the bowel was transected. The mesentery and sigmoid vessels were controlled with enseal device. At the level of the pelvic inlet the distal resection margin was chosen. The mesentery was scored with electrocautery at this site. At this level the contour stapling device was placed and fired. The sigmoid colon was then sent to pathology. The rectal stump was marked with prolene sutures. The abdomen was then lavaged with normal saline. The portion of small intestine that had been adherent here also required resection. Appropriate locations proximal and distal to the involved small intestine had mesenteric windows created with electrocautery. This allowed for insertion of ARACELIS stapler with blue loads and resultant resection of the small intestine at these two locations. The mesentery between was dissected with enseal device. The freed small intestine was then sent to pathology and measured approximately three inches. The two ends of the small intestine were then oriented in a side-to-side functional end-to-end manner. The antimesenteric ends at the staple line were cut with martinez scissors and the two ends of the ARACELIS stapler with blue load inserted. The stapler was closed and fired to create the anastomosis. The resultant enterotomy was closed with blue load on the ARACELIS stapler. The anastomosis was palpated and of sufficient size. The staple line was oversewn with 3-0 silk sutures in lembert fashion. The mestenteric defect was closed with 3-0 silk sutures.   An appropriate location at the level of the umbilicus in the left abdomen was chosen for creation

## 2020-08-14 NOTE — PROGRESS NOTES
Hospitalist Progress Note      PCP: Rosa Giles MD    Date of Admission: 8/4/2020    Chief Complaint: abd pain     Hospital Course:       Subjective:    Patient is up in bed, in mild distress due to abdominal pain. Complaining of abdominal discomfort and generalized weakness. Medications:  Reviewed    Infusion Medications    PN-Adult Premix 5/20 - Standard Electrolytes - Central Line      sodium chloride 100 mL/hr at 08/14/20 0134     Scheduled Medications    0.9 % sodium chloride  250 mL Intravenous Once    lidocaine 1 % injection  5 mL Intradermal Once    sodium chloride flush  10 mL Intravenous 2 times per day    fat emulsion  250 mL Intravenous Once per day on Mon Thu    tamsulosin  0.4 mg Oral Daily    pantoprazole  40 mg Intravenous BID    piperacillin-tazobactam  3.375 g Intravenous Q6H    tiotropium  2 puff Inhalation Daily    dilTIAZem  120 mg Oral Daily    sodium chloride flush  10 mL Intravenous 2 times per day    enoxaparin  40 mg Subcutaneous Daily    methylPREDNISolone  30 mg Intravenous Q12H     PRN Meds: sodium chloride flush, simethicone, potassium chloride, hydrOXYzine, sodium chloride flush, acetaminophen **OR** acetaminophen, polyethylene glycol, promethazine **OR** ondansetron, HYDROmorphone, albuterol sulfate HFA, mineral oil-hydrophilic petrolatum      Intake/Output Summary (Last 24 hours) at 8/14/2020 0920  Last data filed at 8/14/2020 0404  Gross per 24 hour   Intake 4158.33 ml   Output 725 ml   Net 3433.33 ml       Physical Exam Performed:    /66   Pulse 99   Temp 98.2 °F (36.8 °C) (Oral)   Resp 16   Ht 6' 1\" (1.854 m)   Wt 150 lb 9.6 oz (68.3 kg)   SpO2 94%   BMI 19.87 kg/m²     General appearance:  No apparent distress, appears stated age and cooperative. HEENT:  Normal cephalic, atraumatic without obvious deformity. Pupils equal, round, and reactive to light.  Extra ocular muscles intact. Conjunctivae/corneas clear.   Neck: Supple, with full range of motion. No jugular venous distention. Trachea midline. Respiratory:  Normal respiratory effort. Clear to auscultation, bilaterally without Rales/Wheezes/Rhonchi. Cardiovascular:  Regular rate and rhythm with normal S1/S2 without murmurs, rubs or gallops. Abdomen: Soft, mildly tender(just received iv pain meds), non-distended with dec'd bowel sounds. Musculoskeletal:  No clubbing, cyanosis or edema bilaterally.  Full range of motion without deformity. Skin: Skin color, texture, turgor normal.  No rashes or lesions except diffuse erythema noted over entire body, small blistering noted on right hand, diffuse swelling/ansarca noted  Neurologic:  Neurovascularly intact without any focal sensory/motor deficits. Cranial nerves: II-XII intact, grossly non-focal.  Psychiatric:  Alert and oriented, thought content appropriate, normal insight  Capillary Refill: Brisk,< 3 seconds   Peripheral Pulses: +2 palpable, equal bilaterally       Labs:   Recent Labs     08/12/20  0615 08/13/20  0520 08/14/20  0545   WBC 9.9 7.3 5.6   HGB 10.7* 10.2* 10.5*   HCT 32.3* 30.7* 31.6*   * 88* 68*     Recent Labs     08/12/20  1338 08/13/20  0520 08/14/20  0545   * 136 133*   K 5.8* 5.2* 5.1    104 101   CO2 22 23 22   BUN 30* 32* 39*   CREATININE 1.4* 1.4* 1.4*   CALCIUM 8.1* 7.8* 7.6*   PHOS 3.3  --   --        Urinalysis:      Lab Results   Component Value Date    NITRU Negative 08/12/2020    WBCUA None seen 08/12/2020    BACTERIA Rare 08/12/2020    RBCUA 3-4 08/12/2020    BLOODU SMALL 08/12/2020    SPECGRAV 1.025 08/12/2020    GLUCOSEU Negative 08/12/2020    GLUCOSEU NEGATIVE 11/30/2011       Radiology:  CT ABDOMEN PELVIS W IV CONTRAST   Final Result   Addendum 1 of 1   ADDENDUM:   Critical results were called by Dr. Thomas Moreno MD to [address] [place] [address] on 8/13/2020 at 20:40.          Final      XR ACUTE ABD SERIES CHEST 1 VW   Final Result   Decreased but persistent mild air-filled distended/dilation of small bowel. Caliber of colon is within normal limits. Oral contrast given for the recent   examinations is within colon with minimal if any remaining in small bowel      There is a persistent large amount of pneumoperitoneum under the diaphragm         FL UGI   Final Result   No contrast extravasation noted         CT ABDOMEN PELVIS W IV CONTRAST   Final Result   Moderate amount of pneumoperitoneum, similar to prior. There is a small   linear tract of gas marginating the anterior pyloric region of the stomach   anteriorly, which could suggest site of perforation. Focal collection of fluid and gas marginates the sigmoid colon in the pelvis,   suggesting early pericolonic abscess from perforated diverticulitis. There   is wall thickening of the sigmoid colon in the pelvis with surrounding   inflammatory change. Given the lack of significant free air in the pelvis,   this is not likely to be the source of the perforation. Persistent small bowel wall thickening in the pelvis, likely reactive due to   the underlying pelvic ascites.          CT ABDOMEN PELVIS WO CONTRAST Additional Contrast? Oral   Final Result   No extraluminal oral contrast to indicate a site of gastrointestinal   perforation         CT ABDOMEN PELVIS W IV CONTRAST Additional Contrast? None   Final Result   Addendum 1 of 1   ADDENDUM:   These results were communicated by telephone to Dr. Karo Ambrocio at 11:06 a.m.    on   08/04/2020         Final              Assessment/Plan:    Active Hospital Problems    Diagnosis    Pneumoperitoneum [K66.8]    Abdominal pain, left lower quadrant [R10.32]     abd pain- with Pneumoperitoneum noted on CT  -gen surg consulted, apprec mgmt,  attempted to tx nonoperatively  -Tele  -Npo except meds/ice chips initially, advanced as tolerated  -Prn iv dilaudid ordered  IV zosyn started 8/4 and continued  -Gentle ivfs ordered   -repeat CT 8/7 noted moderate pneumoperitoneum(similar to prior, ?stomach as site of

## 2020-08-14 NOTE — PROGRESS NOTES
At 1400,  The patient was emergently transferred to ICU, due to low blood pressure. Plan for exploratory laparotomy today with Dr. Julien Swanson. Tranferred to the ICU and placed on the Hard[employer] monitor. Report was received from [address] [place] [address]. At 1520, and NG was placed down the patient's left nare, due to frequent vomiting and aspiration concerns. Marked at the 75 cm marker. 200 mls of brown malodorous fluid immediately drained. Patient also attempting to pull off oxygen and pull out lines. Placed in restraints. At 1526, the patient was intubated by Dr. Anila Fisher. See Sedation narrator.     1555, the patient was taken to the OR by the Anesthesia team.

## 2020-08-14 NOTE — PROGRESS NOTES
Per , currently in a rapid, give info to gen sx. Called and left a message for gen sx to call Katerine Randolph with [facility] at [phone]. Sumaya Barreto RN aware.

## 2020-08-14 NOTE — CONSULTS
INPATIENT PULMONARY CRITICAL CARE CONSULT NOTE      Chief Complaint/Referring Provider:  Patient is being seen at the request of Dr. Jil Bañuelos for a consultation for acute respiratory failure     Presenting HPI: Nola Talamantes is a 41-year-old male with a complicated medical history. He had an admission in July with a Siegel-Marlon like reaction. The patient has severe COPD, has had multiple pneumothoraces, is followed by Dr. Kasey Ashley. He has had atrial fibrillation, is followed by Mitchel Elias. He has been diagnosed with mantle cell lymphoma, followed by Dr. Harshil Stevenson. The patient was admitted through the ER on 8/4/2020 with severe left lower quadrant pain. CT abdomen and pelvis showed large amount of pneumoperitoneum without identified source of air. The patient was evaluated by general surgery, was placed on Zosyn and repeat CT with p.o. contrast was recommended. It was felt his pain was compatible with diverticulitis. The patient's wife mentions that he has had diarrhea on and off for 3 months. The patient was conserved, was gradually improving. He was eating, having small stools but felt bloated with left-sided abdominal pain. Repeat CT of the abdomen and pelvis was recommended yesterday. Today the patient had progressively worsening pain, nausea, emesis and chills. There was worsening lab exam and imaging, the plan was to have platelet transfusion, PICC line placement and need to go to the OR. I was called emergently to the bedside by nursing as the patient had become progressively worse with progressive hypoxemia. The patient was unable to provide any history, was lethargic with labored respirations. His wife is at bedside. The patient had been a heavy smoker in the past, did not drink alcohol.      Patient Active Problem List    Diagnosis Date Noted    Encounter for long-term (current) use of high-risk medication 04/06/2016     Priority: High    Mantle cell lymphoma of lymph nodes of multiple sites ([address] 10/06/2015     Priority: High    Pneumoperitoneum 2020    Abdominal pain, left lower quadrant     Dermatitis 2020    Cigarette smoker 2020    PAF (paroxysmal atrial fibrillation) ([address] 2018    Typical atrial flutter ([address] 2018    Spontaneous pneumothorax 2018    Chest pain     Autologous bone marrow transplantation 2013    Primary spontaneous pneumothorax 2011    COPD, severe ([address] 2011    Tobacco abuse disorder 2011       Past Medical History:   Diagnosis Date    Blood transfusion     Cancer ([address]     basal and squamous cell skin ca    COPD, mild ([address]     Dermatitis 2020    Disease of blood and blood forming organ     History of blood transfusion     Medical history reviewed with no changes     Non Hodgkin's lymphoma ([address] 12    chemo started 12/26/12x6, then stem cell tx    Pneumonia     lymphoma, pneumo history    Pneumothorax Dec. 2012    Spontaneous pneumothorax     x 3 last 11/3/2011        Past Surgical History:   Procedure Laterality Date    CHEST TUBE INSERTION      x3    COLONOSCOPY      polyps q 5 yrs    COLONOSCOPY  5/15/13    FEMUR SURGERY      r/t fracture with retained hardware    FRACTURE SURGERY      broken arm    FRACTURE SURGERY      femur    LYMPH NODE BIOPSY  12    right inguinal lymph node excision and biopsy    MIDDLE EAR SURGERY      OTHER SURGICAL HISTORY  12    stem cell transplant    SKIN BIOPSY      SKIN CANCER DESTRUCTION      STOMACH SURGERY      at age 7 weeks    THORACOSCOPY Left 2018    video assisted thoracoscopy,left upper lobe wedge resection;mechanical and chemical pleurodesis    TUNNELED VENOUS PORT PLACEMENT          Family History   Problem Relation Age of Onset    High Blood Pressure Father     Stroke Father 68    Alcohol Abuse Sister     Liver Disease Sister          age 54    Cancer Brother     COPD 133*   K 5.8* 5.2* 5.1    104 101   CO2 22 23 22   PHOS 3.3  --   --    BUN 30* 32* 39*   CREATININE 1.4* 1.4* 1.4*     LIVER PROFILE: No results for input(s): AST, ALT, LIPASE, BILIDIR, BILITOT, ALKPHOS in the last 72 hours. Invalid input(s): AMYLASE,  ALB  PT/INR:   Recent Labs     08/14/20  0737 08/14/20  1037   PROTIME 12.0 11.5   INR 1.03 0.99     APTT:   Recent Labs     08/14/20  1037   APTT 27.2     UA:  Recent Labs     08/12/20  1512   COLORU Yellow   PHUR 5.5   WBCUA None seen   RBCUA 3-4   BACTERIA Rare*   CLARITYU CLOUDY*   SPECGRAV 1.025   LEUKOCYTESUR Negative   UROBILINOGEN 0.2   BILIRUBINUR Negative   BLOODU SMALL*   GLUCOSEU Negative   AMORPHOUS 2+       Imaging:  I have reviewed radiology images personally. IR PICC WO SQ PORT/PUMP > 5 YEARS   Final Result   Successful placement of PICC line. CT ABDOMEN PELVIS W IV CONTRAST   Final Result   Addendum 1 of 1   ADDENDUM:   Critical results were called by Dr. Yvette Capps MD to [address] [place] [address] on 8/13/2020 at 20:40. Final      XR ACUTE ABD SERIES CHEST 1 VW   Final Result   Decreased but persistent mild air-filled distended/dilation of small bowel. Caliber of colon is within normal limits. Oral contrast given for the recent   examinations is within colon with minimal if any remaining in small bowel      There is a persistent large amount of pneumoperitoneum under the diaphragm         FL UGI   Final Result   No contrast extravasation noted         CT ABDOMEN PELVIS W IV CONTRAST   Final Result   Moderate amount of pneumoperitoneum, similar to prior. There is a small   linear tract of gas marginating the anterior pyloric region of the stomach   anteriorly, which could suggest site of perforation. Focal collection of fluid and gas marginates the sigmoid colon in the pelvis,   suggesting early pericolonic abscess from perforated diverticulitis.   There   is wall thickening of the sigmoid colon in the pelvis Peritoneum/Retroperitoneum: Persistent large pneumoperitoneum. No intraperitoneal oral contrast Bones/Soft Tissues: Stable multiple compression fractures     No extraluminal oral contrast to indicate a site of gastrointestinal perforation     Xr Acute Abd Series Chest 1 Vw    Result Date: 8/12/2020  EXAMINATION: TWO XRAY VIEWS OF THE ABDOMEN AND SINGLE  XRAY VIEW OF THE CHEST 8/12/2020 10:51 am COMPARISON: CT abdomen and pelvis August 7, 2020 and upper GI August 10, 2020 HISTORY: ORDERING SYSTEM PROVIDED HISTORY: eval for persistent bowel distention TECHNOLOGIST PROVIDED HISTORY: Reason for exam:->eval for persistent bowel distention Reason for Exam: eval for persistent bowel distention Acuity: Acute Type of Exam: Initial FINDINGS: Pneumoperitoneum persists with a large amount of air under the diaphragm. There is oral contrast within the colon. Bowel dilation has slightly decreased. Mild bilateral lower lobe opacity is favored to be atelectasis. Decreased but persistent mild air-filled distended/dilation of small bowel. Caliber of colon is within normal limits. Oral contrast given for the recent examinations is within colon with minimal if any remaining in small bowel There is a persistent large amount of pneumoperitoneum under the diaphragm     Ct Soft Tissue Neck W Contrast    Result Date: 8/2/2020  EXAMINATION: CT OF THE NECK SOFT TISSUES WITH CONTRAST  7/31/2020 TECHNIQUE: CT of the neck was performed with the administration of intravenous contrast. Multiplanar reformatted images are provided for review. Dose modulation, iterative reconstruction, and/or weight based adjustment of the mA/kV was utilized to reduce the radiation dose to as low as reasonably achievable.  COMPARISON: 06/08/2020, 04/13/2020, 04/03/2015 HISTORY: ORDERING SYSTEM PROVIDED HISTORY: Mantle cell lymphoma of lymph nodes of multiple sites [facility]) TECHNOLOGIST PROVIDED HISTORY: Reason for exam:->staging Reason for Exam: history of mantle cell lymphoma, pt not feeling well, arms swollen, unable to raise arms above head Acuity: Chronic Type of Exam: Subsequent/Follow-up Additional signs and symptoms: history of mantle cell lymphoma, pt not feeling well, arms swollen, unable to raise arms above head FINDINGS: PHARYNX/LARYNX:  The palatine tonsils are normal in appearance. The tongue is normal in appearance. The valleculae, epiglottis, aryepiglottic folds and pyriform sinuses appear unremarkable. The true and false vocal cords are normal in appearance. No mass or abscess is seen. SALIVARY GLANDS/THYROID:  The parotid and submandibular glands appear unremarkable. The thyroid gland appears unremarkable. LYMPH NODES:  The patient's known mild bilateral cervical chain and left supraclavicular lymphadenopathy is stable and unchanged dating back to the study of 06/08/2020. The following target lesions are as follows: There is a stable right posterior submandibular lymph node measuring 12 x 6 mm on image 47 of series 2. There is a stable left sternocleidomastoid lymph node measuring 7 x 4 mm on image 66 of series 2. There is a stable left supraclavicular lymph node measuring 10 x 5 mm on image 73 of series 2. No new pathologic lymphadenopathy is identified. SOFT TISSUES:  No appreciable soft tissue swelling or mass is seen. BRAIN/ORBITS/SINUSES:  The visualized portion of the intracranial contents appear unremarkable. The visualized portion of the paranasal sinuses demonstrate no acute abnormality. The patient is status post bilateral mastoidectomies. LUNG APICES/SUPERIOR MEDIASTINUM:  The lung findings are reported separately. BONES:  No aggressive appearing lytic or blastic bony lesion. 1. No acute abnormality of the neck soft tissues. 2. Stable mild bilateral cervical chain and left supraclavicular lymphadenopathy, unchanged from 06/08/2020. No new pathologic lymphadenopathy is identified.      Ct Abdomen Pelvis W Iv Contrast    Addendum Date: 8/13/2020    ADDENDUM: Critical results were called by Dr. Yvette Capps MD to [facility] on 8/13/2020 at 20:40. Result Date: 8/13/2020  EXAMINATION: CT OF THE ABDOMEN AND PELVIS WITH CONTRAST 8/13/2020 5:52 pm TECHNIQUE: CT of the abdomen and pelvis was performed with the administration of intravenous contrast. Multiplanar reformatted images are provided for review. Dose modulation, iterative reconstruction, and/or weight based adjustment of the mA/kV was utilized to reduce the radiation dose to as low as reasonably achievable. COMPARISON: CT abdomen and pelvis, 08/07/2020 HISTORY: ORDERING SYSTEM PROVIDED HISTORY: f/u perforated viscus, persistent abdominal distention TECHNOLOGIST PROVIDED HISTORY: With oral and IV contrast Reason for exam:->f/u perforated viscus, persistent abdominal distention Reason for Exam: f/u perforated viscus, persistent abdominal distention Acuity: Acute Type of Exam: Initial FINDINGS: Lower Chest: Small bilateral pleural effusions. Moderate emphysematous changes. Coronary artery calcifications. Organs: Scattered hepatic hypodensities are again noted. No suspicious abnormality is seen. The pancreas, spleen and adrenal glands are unremarkable. The kidneys enhance symmetrically. 5 mm right renal cyst.  No follow-up recommended. GI/Bowel: There is a moderate-sized hiatal hernia. Oral contrast has been administered. The stomach is mildly distended. Pre pyloric pneumoperitoneum is no longer seen. There is a generalized ileus, with oral contrast progressing to the level of the rectum. There is mural thickening of the sigmoid colon. Although no leakage is present, in the right recto vesicular space a fluid collection with dependent (oral) contrast has developed, measuring 11 x 11.5 cm. It is in close proximity to pelvic loops of small bowel which are unopacified, possibly with pneumatosis. Mesenteric vasculature enhances in normal fashion.   No bowel lacking is normal A few circumscribed hypodense foci seen in the liver, likely cyst or hemangioma. Adrenal glands are unchanged. Kidneys are unchanged. No hydronephrosis. No pancreatic ductal dilatation. There is diffuse pneumoperitoneum identified, similar in degree compared to prior. A small linear tract of gas marginates the antral pyloric region of the stomach, similar to prior. Small bubbles of gas are also seen in the alvina. GI/Bowel: No significant small bowel distention. Oral contrast is seen throughout the colon and small bowel. Scattered colonic diverticula are seen. Small bowel wall thickening is seen in the pelvis. There is wall thickening seen in the colon in the pelvis, with a suspected focal area of extraluminal gas and fluid marginating the sigmoid, measuring 4.9 x 3.0 cm. Pelvis: Small amount of free fluid is seen within the pelvis. Streak artifact from orthopedic hardware in the right limits evaluation of the pelvis. Peritoneum/Retroperitoneum: Atherosclerotic change seen in aorta. Scattered small retroperitoneal nodes are seen Bones/Soft Tissues: Bones are osteopenic. Scattered compression deformities are seen, most pronounced at L3 and L5. Moderate amount of pneumoperitoneum, similar to prior. There is a small linear tract of gas marginating the anterior pyloric region of the stomach anteriorly, which could suggest site of perforation. Focal collection of fluid and gas marginates the sigmoid colon in the pelvis, suggesting early pericolonic abscess from perforated diverticulitis. There is wall thickening of the sigmoid colon in the pelvis with surrounding inflammatory change. Given the lack of significant free air in the pelvis, this is not likely to be the source of the perforation. Persistent small bowel wall thickening in the pelvis, likely reactive due to the underlying pelvic ascites.      Ct Abdomen Pelvis W Iv Contrast Additional Contrast? None    Addendum Date: 8/4/2020 ADDENDUM: These results were communicated by telephone to Dr. Charan Webster at 11:06 a.m. on 08/04/2020     Result Date: 8/4/2020  EXAMINATION: CT OF THE ABDOMEN AND PELVIS WITH CONTRAST 8/4/2020 10:18 am TECHNIQUE: CT of the abdomen and pelvis was performed with the administration of intravenous contrast. Multiplanar reformatted images are provided for review. Dose modulation, iterative reconstruction, and/or weight based adjustment of the mA/kV was utilized to reduce the radiation dose to as low as reasonably achievable. COMPARISON: 07/31/2020 HISTORY: ORDERING SYSTEM PROVIDED HISTORY: LLQ pain TECHNOLOGIST PROVIDED HISTORY: Reason for exam:->LLQ pain Additional Contrast?->None Reason for Exam: LLQ pain since yesterday, loose stools this AM Acuity: Acute Type of Exam: Initial Relevant Medical/Surgical History: pt sts mantle cell lymphoma; starts chemo this week FINDINGS: Lower Chest: Small right pleural effusion Organs: Hepatic cysts measuring up to 3.2 cm. No portal venous gas. Bilateral 1 cm renal cysts. 3 mm left lower pole renal stone. Normal sized spleen with no focal lesion. Pancreas, adrenals, gallbladder unremarkable GI/Bowel: Normal enhancement of the mesenteric vessels. No mesenteric venous gas. Extensive small bowel wall thickening. No pneumatosis. No obvious colonic wall thickening. Diverticula of the sigmoid colon with no definite pericolonic inflammatory change or focal pericolonic extraluminal gas. Pelvis: Streak artifact from right femoral neck nail. Urinary bladder unremarkable. Peritoneum/Retroperitoneum: Large pneumoperitoneum. Small amount of peritoneal fluid. Atherosclerosis of normal caliber aorta, with patent visceral arteries. Stable lymph nodes in the upper abdomen Bones/Soft Tissues: Stable multiple lower thoracic and lumbar compression fractures     Large amount of pneumoperitoneum. The exact origin of the pneumoperitoneum is uncertain.   There is extensive small bowel wall thickening, however the mesenteric vessels are patent, and there are no other findings of ischemia such as pneumatosis or mesenteric/portal venous gas. There is diverticulosis of the sigmoid colon, however there are no definite findings of diverticulitis although perforated diverticulum would be suggested by the history of left lower quadrant pain. The diffuse small bowel wall thickening is compatible with nonspecific enteritis     Vl Extremity Venous Left    Result Date: 7/29/2020  Upper Extremities Veins  Demographics   Patient Name       Clarissa Crandall   Date of Study      07/29/2020         Gender              Male   Patient Number     [id number]         Date of Birth       1945   Visit Number       [id number]          Age                 77 year(s)   Accession Number   [id number]         Room Number         OP   Corporate ID       [id number]           Sonographer         Anny Harris RVT   Ordering Physician Khai Hudson, Interpreting        [facility]                     MD                 Physician           Yany Crowley MD, [facility], RPVI  Procedure Type of Study:   Veins:Upper Extremities Veins, VL EXTREMITY VENOUS DUPLEX LEFT. Vascular Sonographer Report  Indications for Study:Arm swelling. Additional Indications:Left upper extremity swelling increasing greatly in the past week. He fell approximately two weeks ago and had an open laceration on the left mid upper arm area. All four limbs are swollen and skin is red all over his body. He is on experimental medication for lymphoma. He has a port in the left chest. Venous Duplex Scan: B-mode imaging of the deep and superficial veins, with compression maneuvers, including color and Doppler spectral waveform analysis.  Impressions Right Impression There is no evidence of deep venous thrombosis involving the subclavian vein. Left Impression There is no visible B-mode evidence of deep or superficial venous thrombosis involving the left upper extremity. Abnormal, continuous venous Doppler signals are noted in the left internal jugular, subclavian and axillary vein possibly due to a more proximal obstruction or extrinsic compression. Conclusions   Summary   1. No evidence of deep vein or superficial venous thrombosis of the left  upper extremity. 2. Abnormal continuous venous Doppler signals noted and are possibly due to  a more proximal obstruction or extrinsic compression. Signature   ------------------------------------------------------------------  Electronically signed by Alli Mcdaniel MD, [facility]  (Interpreting physician) on 07/29/2020 at 05:03 PM  ------------------------------------------------------------------  Patient Status:Routine. Study [facility] [address] - Vascular Lab. Technical Quality:Adequate visualization. Risk Factors   - The patient has Lymphoma. Velocities are measured in cm/s ; Diameters are measured in mm Right UE Vein Measurements 2D Measurements +--------+----------+---------------+----------+ ! Location! Visualized! Compressibility! Thrombosis! +--------+----------+---------------+----------+ ! SCV     ! Yes       ! Yes            ! None      ! +--------+----------+---------------+----------+ Doppler Measurements +--------+------+------+ ! Location! Signal!Reflux! +--------+------+------+ ! SCV     ! Phasic!      ! +--------+------+------+ Left UE Vein Measurements 2D Measurements +--------+----------+---------------+----------+ ! Location! Visualized! Compressibility! Thrombosis! +--------+----------+---------------+----------+ ! IJV     ! Yes       ! Yes            ! None      ! +--------+----------+---------------+----------+ ! SCV     ! Yes       ! Yes            ! None      ! +--------+----------+---------------+----------+ !Axillary! Yes       ! Yes            ! None      ! +--------+----------+---------------+----------+ ! Brachial!Yes       ! Yes            ! None      ! +--------+----------+---------------+----------+ ! Radial  !Yes       ! Yes            ! None      ! +--------+----------+---------------+----------+ ! Ulnar   ! Yes       ! Yes            ! None      ! +--------+----------+---------------+----------+ ! Basilic ! Yes       ! Yes            ! None      ! +--------+----------+---------------+----------+ ! Cephalic! Yes       ! Yes            ! None      ! +--------+----------+---------------+----------+ Doppler Measurements +--------+----------+------+ ! Location! Signal    !Reflux! +--------+----------+------+ ! IJV     ! Continuous!      ! +--------+----------+------+ ! SCV     ! Continuous!      ! +--------+----------+------+ ! Axillary! Continuous!      ! +--------+----------+------+ ! Brachial!Phasic    !      ! +--------+----------+------+    Ir Picc Wo Sq Port/pump > 5 Years    Result Date: 8/14/2020  EXAMINATION: LIMITED ULTRASOUND OF THE ARM FOR PICC ACCESS, 8/14/2020 TECHNIQUE: The PICC team used ultrasound and VPS guidance to place a PICC line. HISTORY: ORDERING SYSTEM PROVIDED HISTORY: limited access TECHNOLOGIST PROVIDED HISTORY: Reason for exam:->limited access How many lumens are being requested?->3 What site is the preferred site? ->No preference What side should this line be placed? ->Either FLUOROSCOPY DOSE AND TYPE OR TIME AND EXPOSURES: Fluoroscopy was not used FINDINGS: Ultrasound images demonstrate patency of the right brachial vein which was used for access for placement of a PICC by the PICC team, without a radiologist present. VPS guidance was used by the PICC team By report, a 41 cm triple lumen right brachial vein PICC line was placed. Successful placement of PICC line.      Ct Chest Abdomen Pelvis W Contrast    Result Date: 7/31/2020  EXAMINATION: CT OF THE CHEST, ABDOMEN, AND PELVIS WITH CONTRAST 7/31/2020 1:44 pm TECHNIQUE: CT of the chest, abdomen and pelvis was performed with the administration of intravenous contrast. Multiplanar reformatted images are provided for review. Dose modulation, iterative reconstruction, and/or weight based adjustment of the mA/kV was utilized to reduce the radiation dose to as low as reasonably achievable. COMPARISON: 06/08/2020 HISTORY: ORDERING SYSTEM PROVIDED HISTORY: Mantle cell lymphoma of lymph nodes of multiple sites [facility]) TECHNOLOGIST PROVIDED HISTORY: Additional Contrast?->Oral Reason for exam:->staging Reason for Exam: history of mantle cell lymphoma, pt not feeling well, arms swollen, unable to raise arms above head Acuity: Chronic Type of Exam: Subsequent/Follow-up Additional signs and symptoms: history of mantle cell lymphoma, pt not feeling well, arms swollen, unable to raise arms above head FINDINGS: Chest: Mediastinum: The central airways are patent. There has been slight interval enlargement of a precarinal lymph node which measures 1.6 x 1.6 cm compared with 1.2 x 1.1 cm prior. Lungs/pleura: There has been interval development of small bilateral pleural effusions with associated underlying bibasilar compressive atelectasis. Mild emphysematous changes are present within the lung apices. There has been interval development of a spiculated 1.8 by 0.9 cm soft tissue focus within the left lower lobe. There is scattered bronchovascular nodularity within this region which exhibits a tree-in-bud configuration. Soft Tissues/Bones: There has been interval enlargement of multiple bilateral axillary and lateral chest wall lymph nodes. Index node within the left anterior axilla measures 1.9 cm on the current exam compared with 1.1 cm prior. There is no fracture or aggressive osseous lesion. Abdomen/Pelvis: Organs: The liver, pancreas, spleen, kidneys and adrenals are stable in appearance. GI/Bowel: There is no bowel dilatation, wall thickening or obstruction. Pelvis:  The bladder and prostate are unremarkable. Peritoneum/Retroperitoneum: There is no free air, free fluid or intraperitoneal inflammatory change. Aortocaval node has slightly enlarged, now measuring 1.5 x 1.5 cm. Bones/Soft Tissues: There has been interval development of diffuse soft tissue anasarca. There is no fracture or osseous destruction. 1. Mild interval progression of bilateral axillary, mediastinal and upper abdominal retroperitoneal lymphoma. 2. Interval development of CHF/pulmonary edema. 3. Spiculated left lower lobe soft tissue nodule with associated bronchovascular nodularity. Findings likely reflect an atypical infectious/inflammatory process. Short-term CT follow-up recommended for further characterization. Fl Ugi    Result Date: 8/10/2020  EXAMINATION: SINGLE CONTRAST UPPER GI SERIES 8/10/2020 HISTORY: ORDERING SYSTEM PROVIDED HISTORY: pneumoperitoneum, rule out ulcer, TECHNOLOGIST PROVIDED HISTORY: Reason for exam:->pneumoperitoneum, rule out ulcer, Reason for exam:->please use water soluble contrast Reason for Exam: pneumoperitoneum, rule out ulcer,; Acuity: Acute Type of Exam: Initial COMPARISON: None. TECHNIQUE: Multiple single contrast images of the esophagus, gastroesophageal junction and stomach were obtained following the oral administration of water soluble contrast FLUOROSCOPY DOSE AND TYPE OR TIME AND EXPOSURES: 1.7 minutes fluoroscopy 24 spot films FINDINGS: Patient was evaluated in the semi upright and semi recumbent position. Patient had difficulty tolerating routine standard positioning. Patient swallowed water-soluble contrast without difficulty. Small hiatal hernia is seen. There was an occasional increased tertiary contraction in the a stomach. Contrast empties from the stomach freely. Duodenal bulb appears normal No contrast extravasation noted.      No contrast extravasation noted       Echocardiogram 2/79/3923:  LV systolic function is mildly reduced with an estimated EF of 45-50%. There is mild global hypokinesis. Grade I diastolic dysfunction with normal filling pressure. PFT 8/18/15:  FEV1 1.8 L, 45% predicted, FEV1/FVC ratio 49% with significant bronchodilator response. Evidence of air trapping, DLCO 76%. Assessment and plan:  Acute respiratory failure. The patient worsened on transfer to ICU, was emergently intubated prior to going to the OR. Due to severe COPD, will keep tidal volumes low. Further management based on ABG and clinical course  ? Bibasilar pneumonia versus atelectasis. Will reassess post intubation. Septic shock. Will place on vasopressors. Maintain MAP >65 mmHg  Advanced COPD, pneumothoraces. Bronchodilator. Perforated bowel, mantle cell lymphoma. To go to the OR today  TAZ. Discussed with Dr. Jim Lee, recommending placement of Vas-Cath for possible dialysis  Anemia. Normochromic, normocytic. Transfuse for hemoglobin <7 g/dL  Thrombocytopenia. Monitor  DVT prophylaxis. Lovenox  PUD prophylaxis. PPI    I have examined the patient, reviewed labs, images and medical records. My impression and recommendations are as above. We will follow with you, thank you for the consultation. Due to the immediate potential for life-threatening deterioration due to acute respiratory failure, septic shock, acute kidney injury, I spent 44 minutes providing critical care. This time is excluding time spent performing procedures. Discussed with patient's wife, nursing, Dr. Mya Gottlieb and Dr. Jim Lee.       Electronically signed by:  Wilner Mera MD    8/14/2020    1:07 PM.

## 2020-08-14 NOTE — PROGRESS NOTES
[facility]    Follow up Note           Reason for Consult:  TAZ, Hyperkalemia  Requesting Physician:  Dr. Jono Finley history  Move to ICU for hypotension and n/v w resp distress  CT scan w IV contrast from 8/13 showed worsening pneumoperitoneum and pt is going to surgery , received platelet transfusion as well    Last 24 h uop 725 ml, over 250 ml this shift    ROS: sob, confused on NRB  PSFH: + visitor    Scheduled Meds:   sodium chloride flush  10 mL Intravenous 2 times per day    fat emulsion  250 mL Intravenous Once per day on Mon Thu    meropenem (MERREM) IVPB  1 g Intravenous Q12H    tamsulosin  0.4 mg Oral Daily    pantoprazole  40 mg Intravenous BID    tiotropium  2 puff Inhalation Daily    dilTIAZem  120 mg Oral Daily    sodium chloride flush  10 mL Intravenous 2 times per day    enoxaparin  40 mg Subcutaneous Daily    methylPREDNISolone  30 mg Intravenous Q12H     Continuous Infusions:   PN-Adult Premix 5/20 - Standard Electrolytes - Central Line      norepinephrine      sodium chloride 100 mL/hr at 08/14/20 1158     PRN Meds:.sodium chloride flush, simethicone, potassium chloride, hydrOXYzine, sodium chloride flush, acetaminophen **OR** acetaminophen, polyethylene glycol, promethazine **OR** ondansetron, HYDROmorphone, albuterol sulfate HFA, mineral oil-hydrophilic petrolatum      History of Present Illness on 8/12/20:    77 y.o. yo male with PMH of Mantle cell NHL on experimental drug until 7/14/20 which was discontinued d/t exfoliative dermatis and was treated with steroids, anasarca, COPF who is admitted for bowel perforation on 8/4/20 which was managed medically.  He has improved and had BM on 8/12 and was started on solids   On 8/8 he received iv contrast during CT scan  Pt reports of frequent urination and has condom catheter in place  Was on Po kcl until 8/10  Nephrology consulted for TAZ and hyperkalemia  No recent chemo      Physical exam: Constitutional:  VITALS:  BP 96/64   Pulse 89   Temp 98.1 °F (36.7 °C) (Axillary)   Resp 15   Ht 6' 1\" (1.854 m)   Wt 159 lb 6.3 oz (72.3 kg)   SpO2 100%   BMI 21.03 kg/m²   Gen: alert, awake, on NRB  Skin: no rash, turgor wnl  Heent:  eomi, mmm  Neck: no bruits or jvd noted, thyroid normal  Cardiovascular:  S1, S2 without m/r/g  Respiratory: diminished  Abdomen:  +bs, soft, nt, distended, no hepatosplenomegaly  Ext: 1+ lower extremity edema  Neuro/Psy: AAoriented times 2-3 ; moves all 4 ext      Data/  Recent Labs     08/12/20  0615 08/13/20  0520 08/14/20  0545   WBC 9.9 7.3 5.6   HGB 10.7* 10.2* 10.5*   HCT 32.3* 30.7* 31.6*   MCV 90.8 90.3 90.0   * 88* 68*     Recent Labs     08/12/20  1338 08/13/20  0520 08/14/20  0545   * 136 133*   K 5.8* 5.2* 5.1    104 101   CO2 22 23 22   GLUCOSE 169* 127* 119*   PHOS 3.3  --   --    BUN 30* 32* 39*   CREATININE 1.4* 1.4* 1.4*   LABGLOM 49* 49* 49*   GFRAA 60* 60* 60*     UA w micro:  Protein 30 otherwise bland  Urine na 35    Assessment  -TAZ  In the setting of limited Po and volume depletion. No evidence of TLS   Has had repeated iv contrast exposure as well, hypotension  -Bowel perf  medical treatment   On medical treatment, worse surgery 8/14  -Mantle cell lymphoma  -edema likley from hypoalbuminemia  -Acute hypoxic resp failure likely aspiration     Plan  -change IVF to NS at 125 ml/h after 2l NS  -keep MAP>65  -d/w dr Richard Guzman re vasc cath while pt in OR for potential need for RRT  -Serial renal panel  -daily wts and strict i/o  -renal dose medications   -avoid nephrotoxins    D/w dr Manuel Macias, RN, pt's wife and son    33 min of critical care time used reviewing the chart, and managing/coordinating the care of this patient. 1710: events noted after rounds; he is been intubated and is requiring levo at 18 mcg/min. Will start CRRT after he comes back from OR    Thank you for the consultation.   Please do not hesitate to call with questions.     Emile Cowan  [facility]  Office: [phone]  Fax:    [phone]

## 2020-08-14 NOTE — PROGRESS NOTES
Pt easily awakened in bed. Pt not willing to perform activity at this time. Pt is having surgery today at [facility]. Pt very emotional, stating he feels he is in misery. Provided pt with emotional support. Pt asking for ice water, per RN pt is NPO until surgery. Pt states he has no other needs at this time.     Arturo Solorzano OTR/L

## 2020-08-15 NOTE — PROGRESS NOTES
[facility] contacted regarding pt eligibility for donation. Due to pts cancer Hx pt not eligible for organ donation.

## 2020-08-15 NOTE — PROGRESS NOTES
Spoke with Dr. Mirella Durbin from Nephrology, updated on pt status. States to keep pt off CRRT overnight and have AM rounding Nephrologist re-evaluate but based on lab results at 1830 CRRT is not emergent and does not need it tonight.

## 2020-08-15 NOTE — PROGRESS NOTES
sodium phosphate IVPB **OR** sodium phosphate IVPB **OR** sodium phosphate IVPB, simethicone, potassium chloride, hydrOXYzine, sodium chloride flush, acetaminophen **OR** acetaminophen, polyethylene glycol, promethazine **OR** ondansetron, HYDROmorphone, albuterol sulfate HFA, mineral oil-hydrophilic petrolatum    ALLERGIES:  Patient is allergic to latex and adhesive tape. Objective:   PHYSICAL EXAM:  /69   Pulse 73   Temp 92 °F (33.3 °C) (Esophageal)   Resp 22   Ht 6' 1\" (1.854 m)   Wt 158 lb 8.2 oz (71.9 kg)   SpO2 (!) 51%   BMI 20.91 kg/m²    Physical Exam  Constitutional:       General: He is not in acute distress. Appearance: He is well-developed. He is ill-appearing. He is not diaphoretic. HENT:      Head: Normocephalic and atraumatic. Mouth/Throat:      Pharynx: No oropharyngeal exudate. Eyes:      Pupils: Pupils are equal, round, and reactive to light. Neck:      Musculoskeletal: Neck supple. Vascular: No JVD. Cardiovascular:      Heart sounds: Normal heart sounds. No murmur. No friction rub. No gallop. Pulmonary:      Effort: Respiratory distress present. Breath sounds: No wheezing or rales. Abdominal:      General: Bowel sounds are normal. There is no distension. Palpations: Abdomen is soft. Tenderness: There is no abdominal tenderness. Lymphadenopathy:      Cervical: No cervical adenopathy. Skin:     General: Skin is dry. Findings: No rash. Neurological:      Mental Status: He is disoriented. Cranial Nerves: Cranial nerve deficit present.       Comments: Intubated, sedated            Data Reviewed:   LABS:  CBC:  Recent Labs     08/14/20  1800 08/15/20  0542 08/15/20  1230 08/15/20  1645   WBC 5.1 3.0*  --  2.9*   HGB 8.7* 9.1* 6.7* 9.8*   HCT 26.9* 29.3*  --  31.2*   MCV 92.5 96.2  --  95.0   PLT 89* 39*  --  15*     BMP:  Recent Labs     08/15/20  0542 08/15/20  1230 08/15/20  1815    144 138   K 5.6* 5.3* 5.1    105 106   CO2 16* 27 22   PHOS 8.1* 7.1* 5.9*   BUN 50* 46* 40*   CREATININE 2.1* 1.9* 1.6*     LIVER PROFILE:   Recent Labs     08/15/20  0542   AST 36   ALT 21   BILITOT 0.3   ALKPHOS 36*     PT/INR:  Recent Labs     08/14/20  0737 08/14/20  1037   PROTIME 12.0 11.5   INR 1.03 0.99     APTT:   Recent Labs     08/14/20  1037   APTT 27.2     UA:No results for input(s): NITRITE, COLORU, PHUR, LABCAST, WBCUA, RBCUA, MUCUS, TRICHOMONAS, YEAST, BACTERIA, CLARITYU, SPECGRAV, LEUKOCYTESUR, UROBILINOGEN, BILIRUBINUR, BLOODU, GLUCOSEU, AMORPHOUS in the last 72 hours. Invalid input(s): [address]     08/15/20  1230 08/15/20  1645   PHART 6.971* 6.865*   TOC6VEU 116.7* 93.9*   PO2ART 55.9* 65.1*       Vent Information  $Ventilation: $Subsequent Day  Vent Type: 840  Vent Mode: AC/PC  Vt Ordered: 450 mL  Rate Set: 24 bmp  Peak Flow: 70 L/min  Pressure Support: 0 cmH20  FiO2 : 100 %  SpO2: (!) 51 %  SpO2/FiO2 ratio: 51  Sensitivity: 3  PEEP/CPAP: 8  I Time/ I Time %: 0.7 s  Humidification Source: HME    CXR personally reviewed, bilateral airspace disease vs edema           Assessment:     1. Acute resp failure, mixed   -multifocal pneumonia, ?aspiration    -acute pulm edema   -compensation for metabolic acidosis   2. Perforated colon s/p surgical intervention with resection  3. TAZ on RRT  4. Septic shock  5. Acute encephalopathy, metabolic  6. Acute mixed CHF  7. Mantle cell NHL    Plan:      -Vent support, serial ABGs  -Titrate sedation as needed  -Titrate vasopressor agents to maintain MAP>65, epi gtt added today. Already on steroids  -Empiric atb  -RRT per nephro, also on bicarb gtt and required 3 amps today for worsening instability  -Eventual repeat echo Monday   -On TPN  -I had a lengthy discussion with the family at the bedside with nursing present, overall guarded prognosis with high risk for decompensation and death soon. Family wished to pursue full code status still.      Due to life threatening resp failure, hemodynamic instability and renal failure this patient is critically ill. Total critical care time involved in his care was 120 mins excluding separately listed procedures.      Yenni Leo MD

## 2020-08-15 NOTE — PROGRESS NOTES
Attempted to start CRRT for patient. Blood pressure systolic of 767 at start, with slow progression of blood flow rate starting at 50 mls/hr. Blood flow rate of 120 mls/hr was reached, when patient's blood dropped from systolic of mid 67'Q to 05T and then 30's. Blood was immediately returned. Blood pressure slowly improved. See flowsheet for trend of vitals.

## 2020-08-15 NOTE — PROGRESS NOTES
CRRT initiated at this time. See MAR for dialysate orders. Pt not stable enough to remove any fluids at this time. Will continue to monitor.

## 2020-08-15 NOTE — PROGRESS NOTES
--  89* 39*   MCV 90.0  --  92.5 96.2        Recent Labs     08/14/20  1830 08/15/20  0102 08/15/20  0542    137 136   K 4.7 5.1 5.6*    108 107   CO2 19* 19* 16*   PHOS 6.3* 7.3* 8.1*   BUN 49* 49* 50*   CREATININE 1.7* 1.9* 2.1*     Recent Labs     08/15/20  0542   AST 36   ALT 21   BILITOT 0.3   ALKPHOS 36*       Magnesium:    Lab Results   Component Value Date    MG 2.00 08/15/2020    MG 1.80 08/15/2020    MG 1.80 08/14/2020     CT A/P 8/7/2020: Moderate amount of pneumoperitoneum, similar to prior. Lesleigh Suman is a small    linear tract of gas marginating the anterior pyloric region of the stomach    anteriorly, which could suggest site of perforation.         Focal collection of fluid and gas marginates the sigmoid colon in the pelvis,    suggesting early pericolonic abscess from perforated diverticulitis.  There    is wall thickening of the sigmoid colon in the pelvis with surrounding    inflammatory change.  Given the lack of significant free air in the pelvis,    this is not likely to be the source of the perforation.         Persistent small bowel wall thickening in the pelvis, likely reactive due to    the underlying pelvic ascites.           Problem List  Patient Active Problem List   Diagnosis    Tobacco abuse disorder    Primary spontaneous pneumothorax    Stage 3 severe COPD by GOLD classification ([address]    Autologous bone marrow transplantation    Mantle cell lymphoma ([address]    Encounter for long-term (current) use of high-risk medication    Chest pain    Spontaneous pneumothorax    PAF (paroxysmal atrial fibrillation) (HCC)    Typical atrial flutter (HCC)    Cigarette smoker    Dermatitis    Pneumoperitoneum    Abdominal pain, left lower quadrant    Acute on chronic respiratory failure with hypoxia ([address]    Septic shock ([address]    TAZ (acute kidney injury) ([address]    Bowel perforation ([address]    Former smoker       ASSESSMENT AND PLAN:      1.) Mantle cell NHL  - Diagnosed initially in in 2012.  - Recently status post treatment with 3rd-line [id number] in the context of a clinical trial between 10/30/2019 - 7/14/2020. - A recent CT scan on 7/31/2020 had shown slight progression. The plan had been for R-Bendamustine to start (8/05/2020) but we will need to hold chemo.  -I told the patient's wife that it would be months before he can start chemotherapy     2.) Exfoliative dermatitis  - Study drug was discontinued due to this adverse event. - We have been treating with Prednisone (Methylpred as an inpatient), foamy cleanser in the AM then an emollient (Aquaphor). 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  - even if he recovered. This appears stable. 3.) Bowel perforation  - Presently treating conservatively with IVF, IV antibitoics, advance diet as tolerated   - surgery following  - The patient is status post surgery for bowel perforation  -He is hypotensive and maxed out on 3 pressors.     4.) Anasarca , chronic - venous dopplers negative   - Had been managing with Torsemide as an outpatient  - This issue is on hold for now. - Defer resuming diuretics to nephrology      5.) Atrial fibrillation     6.) COPD    7) Electrolyte disturbance   - Hyperkalemia and hypocalcemia  - Ask nephrology to see     8) TAZ- likely pre- renal  -This is being managed by renal  -Serum creatinine this morning was 2.1  -They are trying to start dialysis if they can get his pressure up.  -This is critical and I explained this to his wife. 9) Heme   - Platelet count is 38P  - Likely DIC- change Zosyn to Merrem   - Send HIT and ADAMTS 13   - Send LDH and hapto  - smear sent as well   Haptoglobin is normal and LDH is barely up. ADAMTS 13 is pending  -I do not think he has TTP, but if he did this will be a terminal event.  -He is not stable enough for plasmapheresis.   -D-dimer is elevated which is most indicative of sepsis    10) Pain   - Give one time dose Dilaudid 1 mg IVP now His prognosis is very poor and I discussed this with his wife. I also talked to her about how much he would want done. Currently, she wants to stay the course.     Natalee Wong MD  May be reached through [address] [place] [address]

## 2020-08-15 NOTE — PROGRESS NOTES
Dr. Enoch Friend at bedside speaking with pt's wife, Lorena Mins. Per wife, pt to remain a full code for now.

## 2020-08-15 NOTE — PROGRESS NOTES
Dr. Rachid Varghese at bedside to update patietn's family, vasc cath not returning blood and CRRT unable to be initated at this time, Dr. Rachid Varghese used guide wire to change vasc cath, So Francois RN

## 2020-08-15 NOTE — PROGRESS NOTES
Assumed care of patient. Shift assessment complete and documented on flowsheets. VSS. Unable to complete a full Four eyes skin assessment due to pts instability and inability to turn pt. MAR handoff completed with previous RN. Will continue to monitor.

## 2020-08-15 NOTE — PROGRESS NOTES
08/14/20 2349   Vent Information   Vent Type 840   Vent Mode AC/VC   Vt Ordered 450 mL   Rate Set 22 bmp   Peak Flow 60 L/min   Pressure Support 0 cmH20   FiO2  80 %   Sensitivity 3   PEEP/CPAP 5   Humidification Source HME   Vent Patient Data   Peak Inspiratory Pressure 37 cmH2O   Mean Airway Pressure 14 cmH20   Rate Measured 22 br/min   Vt Exhaled 455 mL   Minute Volume 10.4 Liters   I:E Ratio 1:2.30   Cough/Sputum   Sputum How Obtained Endotracheal   Cough Non-productive   Spontaneous Breathing Trial (SBT) RT Doc   Pulse 89   Breath Sounds   Right Upper Lobe Diminished   Right Middle Lobe Diminished   Right Lower Lobe Diminished   Left Upper Lobe Diminished   Left Lower Lobe Diminished   Additional Respiratory  Assessments   Resp 15   Cuff Pressure (cm H2O) 30 cm H2O   Alarm Settings   High Pressure Alarm 40 cmH2O   Low Minute Volume Alarm 2 L/min   High Respiratory Rate 40 br/min   Low Exhaled Vt  200 mL   Non-Surgical Airway Endo Tracheal Tube   Placement Date/Time: 08/14/20 1524   Mask Ventilation: Mask ventilation not attempted (0)  Placed By: Licensed provider  Inserted by: Dr. Lorne Cohen  Insertion attempts: 1  Airway Device: Endo Tracheal Tube  Size: 8  Placement Verified By[[email or web address]] Chest X-ray   Secured at 24 cm   Measured From [facility] By [employer] tube hughes   Site Condition Dry

## 2020-08-15 NOTE — PROGRESS NOTES
Spoke with Dr. Shivani Hermosillo with Nephrology on the phone, discussed assessment, patient's increased need for pressor support overnight,  Acidosis and rising potassium.  Received telephone orders with readback for an additional amp of sodium bicarb to be given, Dr. Shivani Hermosillo is going to order a sodium bicarb drip, check a CVP if <8 give a one liter IV bolus of sodium chloride, and to restart CRRT with a blood flow rate of 100ml/min, and to not remove any fluid from the patient, Itzel Bales RN

## 2020-08-15 NOTE — PROGRESS NOTES
Hospitalist Progress Note      PCP: Tj Miller MD    Date of Admission: 8/4/2020    Chief Complaint: Abdomen pain    Hospital Course: This is a 72-year-old male with past medical history of non-Hodgkin's lymphoma, mantle cell, anemia, chronic pain admitted with abdominal pain recently discharged on 7/20/2020, patient with increased abdominal pain on 8/14/2020, respiratory failure with hypoxia requiring intubation, hypotension secondary to sepsis possible perforation  for sigmoid colon, went to the OR on 8/14/2020 4 exploratory lap status post sigmoidectomy with end colostomy for perforated colon, patient currently requiring 3 pressors, last night unable to tolerate CRRT, again started this morning. Subjective:   Patient is on ventilator intubated comfortable wife at the bedside.     Medications:  Reviewed    Infusion Medications    sodium chloride      IV infusion builder      sodium chloride      PN-Adult Premix 5/20 - Standard Electrolytes - Central Line 42 mL/hr at 08/14/20 2038    norepinephrine 70 mcg/min (08/15/20 0830)    vasopressin (Septic Shock) infusion 0.04 Units/min (08/14/20 1830)    prismaSATE BGK 4/2.5      prismaSATE BGK 4/2.5      prismaSATE BGK 4/2.5      fentaNYL (SUBLIMAZE) infusion 25 mcg/hr (08/14/20 2038)    propofol Stopped (08/14/20 1930)    phenylephrine (MAGDALENO-SYNEPHRINE) 50mg/250mL infusion 250 mcg/min (08/15/20 0906)    sodium chloride 125 mL/hr at 08/15/20 0202     Scheduled Medications    sodium bicarbonate        sodium bicarbonate        sodium bicarbonate  50 mEq Intravenous Once    sodium chloride flush  10 mL Intravenous 2 times per day    fat emulsion  250 mL Intravenous Once per day on Mon Thu    meropenem  1 g Intravenous Q8H    tamsulosin  0.4 mg Oral Daily    pantoprazole  40 mg Intravenous BID    tiotropium  2 puff Inhalation Daily    dilTIAZem  120 mg Oral Daily    sodium chloride flush  10 mL Intravenous 2 times per day    enoxaparin 40 mg Subcutaneous Daily    methylPREDNISolone  30 mg Intravenous Q12H     PRN Meds: sodium chloride flush, magnesium sulfate, calcium gluconate **OR** calcium gluconate **OR** calcium gluconate **OR** calcium gluconate, sodium phosphate IVPB **OR** sodium phosphate IVPB **OR** sodium phosphate IVPB **OR** sodium phosphate IVPB, simethicone, potassium chloride, hydrOXYzine, sodium chloride flush, acetaminophen **OR** acetaminophen, polyethylene glycol, promethazine **OR** ondansetron, HYDROmorphone, albuterol sulfate HFA, mineral oil-hydrophilic petrolatum      Intake/Output Summary (Last 24 hours) at 8/15/2020 0914  Last data filed at 8/15/2020 0717  Gross per 24 hour   Intake 9294.7 ml   Output 1445 ml   Net 7849.7 ml       Physical Exam Performed:    BP (!) 77/32   Pulse 79   Temp 97.1 °F (36.2 °C) (Axillary)   Resp 24   Ht 6' 1\" (1.854 m)   Wt 158 lb 8.2 oz (71.9 kg)   SpO2 (!) 65% Comment: pleth not very good. Unable to obtain a good reading. BMI 20.91 kg/m²     General appearance: No apparent distress, appears stated age and sedated on ventilator  HEENT: Pupils equal, round, and reactive to light. Conjunctivae/corneas clear. Neck: Supple, with full range of motion. No jugular venous distention. Trachea midline. Respiratory:  Normal respiratory effort. Clear to auscultation, bilaterally without Rales/Wheezes/Rhonchi. Cardiovascular: Regular rate and rhythm with normal S1/S2 without murmurs, rubs or gallops. Abdomen: Soft, non-tender, non-distended with normal bowel sounds. Musculoskeletal: No clubbing, cyanosis or edema bilaterally. Full range of motion without deformity. Skin: Skin color, texture, turgor normal.  No rashes or lesions. Neurologic:  Neurovascularly intact without any focal sensory/motor deficits.  Cranial nerves: II-XII intact, grossly non-focal.  Psychiatric: Alert and oriented, thought content appropriate, normal insight  Capillary Refill: Brisk,< 3 seconds   Peripheral Pulses: +2 palpable, equal bilaterally       Labs:   Recent Labs     08/14/20  0545 08/14/20  1613 08/14/20  1800 08/15/20  0542   WBC 5.6  --  5.1 3.0*   HGB 10.5* 9.2* 8.7* 9.1*   HCT 31.6*  --  26.9* 29.3*   PLT 68*  --  89* 39*     Recent Labs     08/14/20  1830 08/15/20  0102 08/15/20  0542    137 136   K 4.7 5.1 5.6*    108 107   CO2 19* 19* 16*   BUN 49* 49* 50*   CREATININE 1.7* 1.9* 2.1*   CALCIUM 6.7* 6.5* 6.5*   PHOS 6.3* 7.3* 8.1*     Recent Labs     08/15/20  0542   AST 36   ALT 21   BILITOT 0.3   ALKPHOS 36*     Recent Labs     08/14/20  0737 08/14/20  1037   INR 1.03 0.99     Recent Labs     08/12/20  1338   CKTOTAL 50       Urinalysis:      Lab Results   Component Value Date    NITRU Negative 08/12/2020    WBCUA None seen 08/12/2020    BACTERIA Rare 08/12/2020    RBCUA 3-4 08/12/2020    BLOODU SMALL 08/12/2020    SPECGRAV 1.025 08/12/2020    GLUCOSEU Negative 08/12/2020    GLUCOSEU NEGATIVE 11/30/2011       Radiology:  XR CHEST PORTABLE   Final Result   Right IJ central venous catheter in adequate position. No postprocedure   pneumothorax. Increased advanced bilateral pulmonary disease over the past 2-3 hours         XR CHEST PORTABLE   Final Result   Endotracheal tube tip is approximately 6 cm above the patricia. Worsening bilateral airspace disease. XR CHEST PORTABLE   Final Result   Bibasilar airspace disease, left greater than right. Findings are   new/increased compared to prior concerning for atelectasis versus pneumonia. Pneumoperitoneum, decreased compared to prior. IR PICC WO SQ PORT/PUMP > 5 YEARS   Final Result   Successful placement of PICC line. CT ABDOMEN PELVIS W IV CONTRAST   Final Result   Addendum 1 of 1   ADDENDUM:   Critical results were called by Dr. Carlota Shoemaker MD to [address] [place] [address] on 8/13/2020 at 20:40.          Final      XR ACUTE ABD SERIES CHEST 1 VW   Final Result   Decreased but persistent mild air-filled 1.  Postop day #1 status post exploratory lap, sigmoidectomy with end colostomy for perforated colon, sepsis requiring 3 pressors, intubated vent management per pulmonary critical care on antibiotic. 2.  Non-Hodgkin's lymphoma management per heme-onc. 3.  Acute kidney injury nephrology consulted currently on CRRT.   4.  Lactic acidosis worsening    DVT Prophylaxis: Heparin subcu  Diet: Diet NPO Effective Now Exceptions are: Sips with Meds, Ice Chips, Popsicles  PN-Adult Premix 5/20 - Standard Electrolytes - Central Line  Code Status: Full Code    PT/OT Eval Status:     Juliana Desai MD

## 2020-08-15 NOTE — PROGRESS NOTES
Dr. Gabriela Rojas at bedside to assess pt regarding low BP. Updated on pt status and what happened when attempting to start CRRT. Will page Nephrology and update on status, will implement.

## 2020-08-15 NOTE — PROGRESS NOTES
4 Eyes Skin Assessment     The patient is being assess for   Shift Handoff    I agree that 2 RN's have performed a thorough Head to Toe Skin Assessment on the patient. ALL assessment sites listed below have been assessed. Areas assessed by both nurses:   [x]   Head, Face, and Ears   [x]   Shoulders, Back, and Chest, Abdomen  [x]   Arms, Elbows, and Hands   [x]   Coccyx, Sacrum, and Ischium  [x]   Legs, Feet, and Heels        Pt assessed as best as possible. Pt critical and does not tolerate position changes. **SHARE this note so that the co-signing nurse is able to place an eSignature**    Co-signer eSignature: {Esignature:[id number]}    Does the Patient have Skin Breakdown?   Yes LDA WOUND CARE was Initiated documentation include the Lorena-wound, Wound Assessment, Measurements, Dressing Treatment, Drainage, and Color\",          Paul Prevention initiated:  Yes   Wound Care Orders initiated:  Yes      [address]  nurse consulted for Pressure Injury (Stage 3,4, Unstageable, DTI, NWPT, Complex wounds)and New or Established Ostomies:  Yes      Primary Nurse eSignature: Electronically signed by Pearl James RN on 8/15/20 at 6:50 PM EDT

## 2020-08-15 NOTE — PROGRESS NOTES
hemodynamic support, and currently can't tolerate any form of renal support (ie dialysis).    - await further discussion w/ family; if pt doesn't start to stabilize so as to be able to have CRRT, will have to consider withdrawal of care   - will follow         Electronically signed by Cami Armstrong MD

## 2020-08-15 NOTE — PROGRESS NOTES
Dr. Eva ureña. Verbal orders given:    1. Change lab draws to every 4hrs  2. Continue sodium bicarb infusion at this time  3. Start the calcium infusion  4.   Ok to give 2 amps of sodium bicarb (telephone order originally received from Dr. Ana Gillis)

## 2020-08-15 NOTE — PROGRESS NOTES
08/15/20 1610   Vent Information   Vent Type 840   Vent Mode AC/PC   Rate Set 24 bmp   Pressure Support 0 cmH20   FiO2  100 %   Sensitivity 3   PEEP/CPAP 8   I Time/ I Time % 0.7 s   Vent Patient Data   High Peep/I Pressure 30   Peak Inspiratory Pressure 38 cmH2O   Mean Airway Pressure 16 cmH20   Rate Measured 24 br/min   Vt Exhaled 454 mL   Minute Volume 10.9 Liters   I:E Ratio 1:2.60   Plateau Pressure 79.98 cmH20   Static Compliance 15.13 mL/cmH2O   Cough/Sputum   Sputum How Obtained None   Spontaneous Breathing Trial (SBT) RT Doc   Pulse 75   Breath Sounds   Right Upper Lobe Diminished   Right Middle Lobe Diminished   Right Lower Lobe Diminished   Left Upper Lobe Diminished   Left Lower Lobe Diminished   Additional Respiratory  Assessments   Resp 22   End Tidal CO2 29 (%)   Alarm Settings   High Pressure Alarm 40 cmH2O   Low Minute Volume Alarm 3 L/min   Apnea (secs) 20 secs   High Respiratory Rate 40 br/min   Low Exhaled Vt  300 mL   Patient Observation   Observations ambu @ bedside   Non-Surgical Airway Endo Tracheal Tube   Placement Date/Time: 08/14/20 1524   Mask Ventilation: Mask ventilation not attempted (0)  Placed By: Licensed provider  Inserted by: Dr. Radha Pardo  Insertion attempts: 1  Airway Device: Endo Tracheal Tube  Size: 8  Placement Verified By[de-identified] Chest X-ray   Secured at 24 cm   Measured From [address]  (moved to the left at this time.)   Secured By Commercial tube hughes   Site Condition Dry

## 2020-08-15 NOTE — PROGRESS NOTES
[facility]    Follow up Note           Reason for Consult:  TAZ, Hyperkalemia  Requesting Physician:  Dr. Jono Finley history  Pt remains critically sick with acidemia, hypoxia  Attempt to start CRRT ;last night but had hypotension and has been resumed this am at lower blood flow of 100 ml/min    ROS: UTO  PSFH: + visitor    Scheduled Meds:   sodium bicarbonate        sodium bicarbonate        sodium bicarbonate  50 mEq Intravenous Once    sodium bicarbonate  50 mEq Intravenous Once    sodium chloride flush  10 mL Intravenous 2 times per day    fat emulsion  250 mL Intravenous Once per day on Mon Thu    meropenem  1 g Intravenous Q8H    tamsulosin  0.4 mg Oral Daily    pantoprazole  40 mg Intravenous BID    tiotropium  2 puff Inhalation Daily    dilTIAZem  120 mg Oral Daily    sodium chloride flush  10 mL Intravenous 2 times per day    enoxaparin  40 mg Subcutaneous Daily    methylPREDNISolone  30 mg Intravenous Q12H     Continuous Infusions:   sodium chloride      IV infusion builder      PN-Adult Premix 5/20 - Standard Electrolytes - Central Line 42 mL/hr at 08/14/20 2038    norepinephrine 55 mcg/min (08/15/20 0651)    vasopressin (Septic Shock) infusion 0.04 Units/min (08/14/20 1830)    prismaSATE BGK 4/2.5      prismaSATE BGK 4/2.5      prismaSATE BGK 4/2.5      fentaNYL (SUBLIMAZE) infusion 25 mcg/hr (08/14/20 2038)    propofol Stopped (08/14/20 1930)    sodium chloride      phenylephrine (MAGDALENO-SYNEPHRINE) 50mg/250mL infusion 250 mcg/min (08/15/20 0730)    sodium chloride 125 mL/hr at 08/15/20 0202     PRN Meds:.sodium chloride flush, magnesium sulfate, calcium gluconate **OR** calcium gluconate **OR** calcium gluconate **OR** calcium gluconate, sodium phosphate IVPB **OR** sodium phosphate IVPB **OR** sodium phosphate IVPB **OR** sodium phosphate IVPB, simethicone, potassium chloride, hydrOXYzine, sodium chloride flush, acetaminophen **OR** acetaminophen, polyethylene glycol, promethazine **OR** ondansetron, HYDROmorphone, albuterol sulfate HFA, mineral oil-hydrophilic petrolatum      History of Present Illness on 8/12/20:    77 y.o. yo male with PMH of Mantle cell NHL on experimental drug until 7/14/20 which was discontinued d/t exfoliative dermatis and was treated with steroids, anasarca, COPF who is admitted for bowel perforation on 8/4/20 which was managed medically. He has improved and had BM on 8/12 and was started on solids   On 8/8 he received iv contrast during CT scan  Pt reports of frequent urination and has condom catheter in place  Was on Po kcl until 8/10  Nephrology consulted for TAZ and hyperkalemia  No recent chemo      Physical exam:   Constitutional:  VITALS:  BP (!) 77/32   Pulse 79   Temp 97.1 °F (36.2 °C) (Axillary)   Resp 24   Ht 6' 1\" (1.854 m)   Wt 158 lb 8.2 oz (71.9 kg)   SpO2 (!) 65% Comment: pleth not very good. Unable to obtain a good reading. BMI 20.91 kg/m²   Gen: intubated  Skin: no rash, turgor wnl  Heent:  ETT in situ  Neck: no bruits or jvd noted, thyroid normal  Cardiovascular:  S1, S2 without m/r/g  Respiratory: diminished  Abdomen:  +bs, soft, nt, distended, no hepatosplenomegaly  Ext: 1+ lower extremity edema  Neuro/Psy: AAoriented times 2-3 ; moves all 4 ext      Data/  Recent Labs     08/14/20  0545 08/14/20  1613 08/14/20  1800 08/15/20  0542   WBC 5.6  --  5.1 3.0*   HGB 10.5* 9.2* 8.7* 9.1*   HCT 31.6*  --  26.9* 29.3*   MCV 90.0  --  92.5 96.2   PLT 68*  --  89* 39*     Recent Labs     08/14/20  1830 08/15/20  0102 08/15/20  0542    137 136   K 4.7 5.1 5.6*    108 107   CO2 19* 19* 16*   GLUCOSE 164* 290* 351*   PHOS 6.3* 7.3* 8.1*   MG 1.80 1.80 2.00   BUN 49* 49* 50*   CREATININE 1.7* 1.9* 2.1*   LABGLOM 40* 35* 31*   GFRAA 48* 42* 37*     UA w micro:  Protein 30 otherwise bland  Urine na 35    Assessment  -TAZ  In the setting of limited Po and volume depletion.  No evidence of TLS   Has had repeated iv contrast exposure as well, hypotension  -Bowel perf  medical treatment   On medical treatment, worse s/p surgery 8/14  -Mantle cell lymphoma  -edema likley from hypoalbuminemia  -Acute hypoxic resp failure likely aspiration     Plan  -CRRT at 100 ml/m BFR as ordered  -bicarb gtt w prn iv bicarb  -1 pRBC  -NS bolus as ordered  -overall prognosis poor    D/w family and rounded multiple times     33 min of critical care time used reviewing the chart, and managing/coordinating the care of this patient. Thank you for the consultation. Please do not hesitate to call with questions.     Karsten Justice  [facility]  Office: [phone]  Fax:    [phone]

## 2020-08-15 NOTE — PROGRESS NOTES
4 Eyes Skin Assessment     The patient is being assess for   Shift Handoff    I agree that 2 RN's have performed a thorough Head to Toe Skin Assessment on the patient. ALL assessment sites listed below have been assessed. Areas assessed by both nurses:   [x]   Head, Face, and Ears   [x]   Shoulders, Back, and Chest, Abdomen  [x]   Arms, Elbows, and Hands   [x]   Coccyx, Sacrum, and Ischium  [x]   Legs, Feet, and Heels          Co-signer eSignature: {[email or web address]:[id number]}    Does the Patient have Skin Breakdown?   Yes LDA WOUND CARE was Initiated documentation include the Lorena-wound, Wound Assessment, Measurements, Dressing Treatment, Drainage, and Color\",          Paul Prevention initiated:  Yes   Wound Care Orders initiated:  JESSICA      WOC nurse consulted for Pressure Injury (Stage 3,4, Unstageable, DTI, NWPT, Complex wounds)and New or Established Ostomies:  JESSICA      Primary Nurse eSignature: Electronically signed by Daisy Quesada RN on 8/15/20 at 8:00 AM EDT

## 2020-08-15 NOTE — PROGRESS NOTES
Dr. Elaine Baldwin made aware or critical values reported per lab at this time. See ot per Solis Leonard for new orders.

## 2020-08-15 NOTE — PLAN OF CARE
Problem: Infection - Central Venous Catheter-Associated Bloodstream Infection:  Goal: Will show no infection signs and symptoms  Will show no infection signs and symptoms   Outcome: Ongoing  Pt w/ JOSEFINA PICC and R subclavian vascath. Both dressings CDI, biopatch in place, no s/s of infection to site at this time. Will con't to monitor CVC sites closely. Problem: Falls - Risk of  Goal: Absence of falls  Outcome: Ongoing  Pt remains a fall risk. See Danica Wili Fall Score. Fall risk bundle in place. Pt bed is in low position with bed alarm on, side rails up, fall risk bracelet and non-skid footwear in use. Will continue to monitor and reassess dickerson fall risk. Problem: Pain:  Goal: Control of acute pain  Outcome: Ongoing  Pt intubated/sedated/unable to respond appropriately to pain assessments; CPOT scale being used. See doc flow for pain assessment details. Will cont to monitor.        Problem: Risk for Impaired Skin Integrity  Goal: Tissue integrity - skin and mucous membranes  Structural intactness and normal physiological function of skin and  mucous membranes. Outcome: Ongoing  Pt remains at risk for skin breakdown- see Paul score. Pt turned q2h as pt can tolerate and heels elevated off bed. Skin is kept clean and dry. Pt has walker  in place to prevent skin breakdown. Will continue to assess skin and monitor for any signs of breakdown. Problem: Infection - Ventilator-Associated Pneumonia:  Goal: Prevent a ventilator associated event (VAE)  Prevent a ventilator associated event (VAE)   Outcome: Ongoing  Pt remains on ventilator. To decrease risk of VAP oral care performed q4 hrs and PRN. Head of bed kept >30 degrees. Hand hygiene performed before and after suctioning.  ET tube rotated q2 hrs to prevent skin breakdown.

## 2020-08-15 NOTE — PROGRESS NOTES
Pt becoming more mottled and edematous t/o at time of assessment. Pt hypothermic. Kvng hugger in place. Pt esophageal temp 90.1 F. Pt unable to tolerate warmer on CRRT. Vas Cath positional.  Pt EUGENE requires q 15- q 30 minute changes. Colostomy is requiring q hour emptying. Pt also had a rectal bowel movement. Pt wife at bedside. Pt sons were also at bedside earlier in shift. Wife had long conversation with multiple MDs about pt code status. Wife originally on board with making pt a DNR. After visit with sons wife and sons would like pt to remain a full code at this time. Pt remains of 3 pressors and requires minimal sedation. Will continue to monitor.

## 2020-08-15 NOTE — PROGRESS NOTES
Ventilator changes made at this time include:    Mode: AC/VC to AC/PC  Pi: initiated at 30 cmH2O  PEEP increased from 5 cmH2O to 8 cmH2O  Ti initiated at 0.7 sec. Changes made discussed with RN and Pulmonology.     Electronically signed by Dandre Cross RCP, RRT, RRT-ACCS on 8/15/2020 at 7:56 AM

## 2020-08-15 NOTE — PROGRESS NOTES
08/15/20 1126   Vent Information   Vent Type 840   Vent Mode AC/PC   Rate Set 24 bmp   Pressure Support 0 cmH20   FiO2  100 %   Sensitivity 3   PEEP/CPAP 8   I Time/ I Time % 0.7 s   Vent Patient Data   High Peep/I Pressure 30   Peak Inspiratory Pressure 39 cmH2O   Mean Airway Pressure 16 cmH20   Rate Measured 24 br/min   Vt Exhaled 481 mL   Minute Volume 11.6 Liters   I:E Ratio 1:2.60   Cough/Sputum   Sputum How Obtained Endotracheal;Suctioned   Cough None   Sputum Amount None   Spontaneous Breathing Trial (SBT) RT Doc   Pulse 89   Breath Sounds   Right Upper Lobe Diminished   Right Middle Lobe Diminished   Right Lower Lobe Diminished   Left Upper Lobe Diminished   Left Lower Lobe Diminished   Additional Respiratory  Assessments   Resp 24   End Tidal CO2 21 (%)   Alarm Settings   High Pressure Alarm 40 cmH2O   Low Minute Volume Alarm 3 L/min   Apnea (secs) 20 secs   High Respiratory Rate 40 br/min   Low Exhaled Vt  300 mL   Patient Observation   Observations ambu @ bedside   Non-Surgical Airway Endo Tracheal Tube   Placement Date/Time: 08/14/20 1524   Mask Ventilation: Mask ventilation not attempted (0)  Placed By: Licensed provider  Inserted by: Dr. Andrews Porter  Insertion attempts: 1  Airway Device: Endo Tracheal Tube  Size: 8  Placement Verified By[de-identified] Chest X-ray   Secured at 24 cm   Measured From Lips   Secured Location Left  (moved to the right at this time.)   Secured By Commercial tube hughes   Site Condition Dry

## 2020-08-16 NOTE — PROGRESS NOTES
hydrOXYzine, sodium chloride flush, acetaminophen **OR** acetaminophen, polyethylene glycol, promethazine **OR** ondansetron, HYDROmorphone, albuterol sulfate HFA, mineral oil-hydrophilic petrolatum      History of Present Illness on 8/12/20:    77 y.o. yo male with PMH of Mantle cell NHL on experimental drug until 7/14/20 which was discontinued d/t exfoliative dermatis and was treated with steroids, anasarca, COPF who is admitted for bowel perforation on 8/4/20 which was managed medically. He has improved and had BM on 8/12 and was started on solids   On 8/8 he received iv contrast during CT scan  Pt reports of frequent urination and has condom catheter in place  Was on Po kcl until 8/10  Nephrology consulted for TAZ and hyperkalemia  No recent chemo      Physical exam:   Constitutional:  VITALS:  /69   Pulse 55   Temp 92 °F (33.3 °C) (Esophageal)   Resp 24   Ht 6' 1\" (1.854 m)   Wt 158 lb 8.2 oz (71.9 kg)   SpO2 (!) 7%   BMI 20.91 kg/m²   Gen: intubated  Skin: mottled  Heent:  ETT in situ  Neck: no bruits or jvd noted, thyroid normal  Cardiovascular:  S1, S2 without m/r/g  Respiratory: diminished  Abdomen:  +bs, soft, nt, distended, no hepatosplenomegaly  Ext: 1+ lower extremity edema  Neuro/Psy: AAoriented times 2-3 ; moves all 4 ext      Data/  Recent Labs     08/15/20  0542 08/15/20  1230 08/15/20  1645 08/16/20  0450   WBC 3.0*  --  2.9* 2.0*   HGB 9.1* 6.7* 9.8* 8.3*   HCT 29.3*  --  31.2* 26.2*   MCV 96.2  --  95.0 94.0   PLT 39*  --  15* 11*     Recent Labs     08/15/20  2120 08/16/20  0135 08/16/20  0450   * 131* 132*   K 4.7 4.8 5.0    100 100   CO2 21 21 20*   GLUCOSE 333* 331* 329*   PHOS 5.6* 4.8 4.8   MG 1.80 1.80 1.90   BUN 36* 31* 28*   CREATININE 1.4* 1.2 1.2   LABGLOM 49* 59* 59*   GFRAA 60* >60 >60     UA w micro:  Protein 30 otherwise bland  Urine na 35    Assessment  -TAZ  In the setting of limited Po and volume depletion.  No evidence of TLS   Has had repeated iv contrast exposure as well, hypotension  -Bowel perf  medical treatment   On medical treatment, worse s/p surgery 8/14  -Mantle cell lymphoma  -edema likley from hypoalbuminemia  -Acute hypoxic resp failure likely aspiration     Plan  -CRRT at 150 ml/m BFR as ordered  -bicarb gtt w prn iv bicarb- 2 additional amps   -cont iv calcium gtt as ordered  -1 pRBC  -code status has been changed to DNR CCA  -overall prognosis poor    D/w pt's wife     33 min of critical care time used reviewing the chart, and managing/coordinating the care of this patient. Thank you for the consultation. Please do not hesitate to call with questions.     Erika Rose  [facility]  Office: [phone]  Fax:    [phone]

## 2020-08-16 NOTE — PROGRESS NOTES
Pulmonary & Critical Care Inpatient Progress Note   Tena Donaldson MD     REASON FOR TODAY'S VISIT:  Critical care management     SUBJECTIVE:   Remains on high vent support, multiple vasopressor agents at high doses, and CRRT  Refractory acidosis requiring additional ampules of bicarb  Multiple family members present at the bedside and aware of grim prognosis    Scheduled Meds:   sodium bicarbonate  100 mEq Intravenous Once    sodium chloride  20 mL Intravenous Once    sodium chloride flush  10 mL Intravenous 2 times per day    fat emulsion  250 mL Intravenous Once per day on Mon Thu    meropenem  1 g Intravenous Q8H    tamsulosin  0.4 mg Oral Daily    pantoprazole  40 mg Intravenous BID    tiotropium  2 puff Inhalation Daily    dilTIAZem  120 mg Oral Daily    sodium chloride flush  10 mL Intravenous 2 times per day    enoxaparin  40 mg Subcutaneous Daily    methylPREDNISolone  30 mg Intravenous Q12H       Continuous Infusions:   sodium chloride      EPINEPHrine infusion 30 mcg/min (08/16/20 0915)    PN-Adult Premix 5/20 - Standard Electrolytes - Central Line 42 mL/hr at 08/15/20 2000    IV infusion builder 25 mL/hr at 08/16/20 0427    IV infusion builder 100 mL/hr at 08/16/20 0441    norepinephrine 100 mcg/min (08/16/20 1025)    vasopressin (Septic Shock) infusion 0.04 Units/min (08/16/20 0946)    prismaSATE BGK 4/2.5 1,000 mL/hr (08/16/20 0836)    prismaSATE BGK 4/2.5 500 mL/hr (08/16/20 0844)    prismaSATE BGK 4/2.5 500 mL/hr (08/16/20 0841)    fentaNYL (SUBLIMAZE) infusion 25 mcg/hr (08/16/20 0051)    propofol Stopped (08/14/20 1930)    phenylephrine (MAGDALENO-SYNEPHRINE) 50mg/250mL infusion 300 mcg/min (08/16/20 0411)       PRN Meds:  sodium chloride flush, magnesium sulfate, calcium gluconate **OR** calcium gluconate **OR** calcium gluconate **OR** calcium gluconate, sodium phosphate IVPB **OR** sodium phosphate IVPB **OR** sodium phosphate IVPB **OR** sodium phosphate IVPB, simethicone, potassium chloride, hydrOXYzine, sodium chloride flush, acetaminophen **OR** acetaminophen, polyethylene glycol, promethazine **OR** ondansetron, HYDROmorphone, albuterol sulfate HFA, mineral oil-hydrophilic petrolatum    ALLERGIES:  Patient is allergic to latex and adhesive tape. Objective:   PHYSICAL EXAM:  /69   Pulse 55   Temp 92 °F (33.3 °C) (Esophageal)   Resp 24   Ht 6' 1\" (1.854 m)   Wt 158 lb 8.2 oz (71.9 kg)   SpO2 (!) 7%   BMI 20.91 kg/m²    Physical Exam  Constitutional:       General: He is not in acute distress. Appearance: He is well-developed. He is ill-appearing. He is not diaphoretic. HENT:      Head: Normocephalic and atraumatic. Mouth/Throat:      Pharynx: No oropharyngeal exudate. Eyes:      Pupils: Pupils are equal, round, and reactive to light. Neck:      Musculoskeletal: Neck supple. Vascular: No JVD. Cardiovascular:      Heart sounds: Normal heart sounds. No murmur. No friction rub. No gallop. Pulmonary:      Effort: Respiratory distress present. Breath sounds: No wheezing or rales. Abdominal:      General: Bowel sounds are normal. There is no distension. Palpations: Abdomen is soft. Tenderness: There is no abdominal tenderness. Lymphadenopathy:      Cervical: No cervical adenopathy. Skin:     General: Skin is dry. Findings: No rash. Neurological:      Mental Status: He is disoriented. Cranial Nerves: Cranial nerve deficit present.       Comments: Intubated, sedated            Data Reviewed:   LABS:  CBC:  Recent Labs     08/15/20  0542 08/15/20  1230 08/15/20  1645 08/16/20  0450   WBC 3.0*  --  2.9* 2.0*   HGB 9.1* 6.7* 9.8* 8.3*   HCT 29.3*  --  31.2* 26.2*   MCV 96.2  --  95.0 94.0   PLT 39*  --  15* 11*     BMP:  Recent Labs     08/15/20  2120 08/16/20  0135 08/16/20  0450   * 131* 132*   K 4.7 4.8 5.0    100 100   CO2 21 21 20*   PHOS 5.6* 4.8 4.8   BUN 36* 31* 28*   CREATININE 1.4* 1.2 1.2     LIVER PROFILE:   Recent Labs     08/15/20  0542   AST 36   ALT 21   BILITOT 0.3   ALKPHOS 36*     PT/INR:  Recent Labs     08/14/20  0737 08/14/20  1037   PROTIME 12.0 11.5   INR 1.03 0.99     APTT:   Recent Labs     08/14/20  1037   APTT 27.2     UA:No results for input(s): NITRITE, COLORU, PHUR, LABCAST, WBCUA, RBCUA, MUCUS, TRICHOMONAS, YEAST, BACTERIA, CLARITYU, SPECGRAV, LEUKOCYTESUR, UROBILINOGEN, BILIRUBINUR, BLOODU, GLUCOSEU, AMORPHOUS in the last 72 hours. Invalid input(s): [address]     08/15/20  1645 08/16/20  0924   PHART 6.865* 6.859*   LTQ1PDZ 93.9* 95.5*   PO2ART 65.1* 54.5*       Vent Information  $Ventilation: $Subsequent Day  Vent Type: 840  Vent Mode: AC/PC  Vt Ordered: 450 mL  Rate Set: 24 bmp  Peak Flow: 70 L/min  Pressure Support: 0 cmH20  FiO2 : 100 %  SpO2: (!) 7 %  SpO2/FiO2 ratio: 7  Sensitivity: 3  PEEP/CPAP: 8  I Time/ I Time %: 0 s  Humidification Source: HME    CXR personally reviewed, bilateral airspace disease vs edema           Assessment:     1. Acute resp failure, mixed   -multifocal pneumonia, ?aspiration    -acute pulm edema   -compensation for metabolic acidosis   2. Perforated colon s/p surgical intervention with resection  3. TAZ on RRT  4. Septic shock  5. Acute encephalopathy, metabolic  6. Acute mixed CHF  7. Mantle cell NHL    Plan:      -Vent support, serial ABGs  -Titrate sedation as needed  -Titrate vasopressor agents to maintain MAP>65,. Already on steroids  -Empiric atb  -RRT per nephro, also on bicarb gtt  -TPN  -I had a lengthy discussion with the family at the bedside and explained guarded prognosis. They are leaning towards a withdrawal of support however wished to wait until further family arrive who are en route. Maintain life sustaining treatment in the interim but I explained to them he could decline further very soon     Due to life threatening resp failure, hemodynamic instability and renal failure this patient is critically ill.  Total critical care time involved in his care was 90 mins    Paresh Crowley MD

## 2020-08-16 NOTE — PROGRESS NOTES
Cardiac time of death 1219. Dr. Haritha Hein, Dr. Rekha Lobo, Dr. Josh Roman, Dr. Cindy Richards, and Dr. Sylvia Rodriguez notified by phone. [facility] called at 1227.

## 2020-08-16 NOTE — PROGRESS NOTES
Dr. Reji Olmstead and Dr. Quan Mack rounding at the bedside. The Patient's condition remains unchanged at this time. Verbal order given by Dr. Reji Olmstead to disregard maximum titration parameters on levophed and epinephrine at this time. Titrate up as required. Family arriving at the bedside. Dr. Reji Olmstead and Dr. Quan Mack discussing the Patient's plan of care with the family at this time.

## 2020-08-16 NOTE — PROGRESS NOTES
08/16/20 0759   Vent Information   Vent Type 840   Vent Mode AC/PC   Vt Ordered 450 mL   Rate Set 24 bmp   Peak Flow 70 L/min   FiO2  100 %   SpO2 96 %   SpO2/FiO2 ratio 96   Sensitivity 3   PEEP/CPAP 8   Humidification Source HME   Vent Patient Data   Peak Inspiratory Pressure 28 cmH2O   Mean Airway Pressure 15 cmH20   Rate Measured 24 br/min   Vt Exhaled 431 mL   Minute Volume 9.7 Liters   I:E Ratio 1:2.8   Plateau Pressure 15 MGZ31   Static Compliance 56 mL/cmH2O   Dynamic Compliance 22 mL/cmH2O   Cough/Sputum   Sputum How Obtained None   Breath Sounds   Right Upper Lobe Diminished   Right Middle Lobe Diminished   Right Lower Lobe Diminished   Left Upper Lobe Diminished   Left Lower Lobe Diminished   Additional Respiratory  Assessments   Resp 24   Cuff Pressure (cm H2O) 30 cm H2O   Patient Observation   Observations [email or web address]   Non-Surgical Airway Endo Tracheal Tube   Placement Date/Time: 08/14/20 1524   Mask Ventilation: Mask ventilation not attempted (0)  Placed By: Licensed provider  Inserted by: Dr. Christianson Cap  Insertion attempts: 1  Airway Device: Endo Tracheal Tube  Size: 8  Placement Verified By[de-[email or web address]] Chest X-ray   Secured at 24 cm   Measured From Lips   Secured Location Left   Secured By Commercial tube hughes   Site Condition Dry

## 2020-08-16 NOTE — PROGRESS NOTES
Hospitalist Progress Note      PCP: Maria Teresa Ramires MD    Date of Admission: 8/4/2020    Chief Complaint: Abdominal pain    Hospital Course: This is a 80-year-old male with past medical history of non-Hodgkin's lymphoma, mantle cell, anemia, chronic pain admitted with abdominal pain recently discharged on 7/20/2020, patient with increased abdominal pain on 8/14/2020, respiratory failure with hypoxia requiring intubation, hypotension secondary to sepsis possible perforation  for sigmoid colon, went to the OR on 8/14/2020 4 exploratory lap status post sigmoidectomy with end colostomy for perforated colon, patient currently requiring 3 pressors,  this morning family deciding for withdrawal of life support. Subjective:   Patient is on ventilator intubated comfortable family members at the bedside saying goodbye.       Medications:  Reviewed    Infusion Medications    sodium chloride      EPINEPHrine infusion 30 mcg/min (08/16/20 0602)    PN-Adult Premix 5/20 - Standard Electrolytes - Central Line 42 mL/hr at 08/15/20 2000    IV infusion builder 25 mL/hr at 08/16/20 0427    IV infusion builder 100 mL/hr at 08/16/20 0441    norepinephrine 50 mcg/min (08/16/20 0620)    vasopressin (Septic Shock) infusion 0.04 Units/min (08/16/20 0045)    prismaSATE BGK 4/2.5 1,000 mL/hr (08/16/20 0836)    prismaSATE BGK 4/2.5 500 mL/hr (08/16/20 0844)    prismaSATE BGK 4/2.5 500 mL/hr (08/16/20 0841)    fentaNYL (SUBLIMAZE) infusion 25 mcg/hr (08/16/20 0051)    propofol Stopped (08/14/20 1930)    phenylephrine (MAGDALENO-SYNEPHRINE) 50mg/250mL infusion 300 mcg/min (08/16/20 0411)     Scheduled Medications    sodium chloride  20 mL Intravenous Once    sodium chloride flush  10 mL Intravenous 2 times per day    fat emulsion  250 mL Intravenous Once per day on Mon Thu    meropenem  1 g Intravenous Q8H    tamsulosin  0.4 mg Oral Daily    pantoprazole  40 mg Intravenous BID    tiotropium  2 puff Inhalation Daily    dilTIAZem  120 mg Oral Daily    sodium chloride flush  10 mL Intravenous 2 times per day    enoxaparin  40 mg Subcutaneous Daily    methylPREDNISolone  30 mg Intravenous Q12H     PRN Meds: sodium chloride flush, magnesium sulfate, calcium gluconate **OR** calcium gluconate **OR** calcium gluconate **OR** calcium gluconate, sodium phosphate IVPB **OR** sodium phosphate IVPB **OR** sodium phosphate IVPB **OR** sodium phosphate IVPB, simethicone, potassium chloride, hydrOXYzine, sodium chloride flush, acetaminophen **OR** acetaminophen, polyethylene glycol, promethazine **OR** ondansetron, HYDROmorphone, albuterol sulfate HFA, mineral oil-hydrophilic petrolatum      Intake/Output Summary (Last 24 hours) at 8/16/2020 0910  Last data filed at 8/16/2020 0740  Gross per 24 hour   Intake 500 ml   Output --   Net 500 ml       Physical Exam Performed:    /69   Pulse 63   Temp 92 °F (33.3 °C) (Esophageal)   Resp 24   Ht 6' 1\" (1.854 m)   Wt 158 lb 8.2 oz (71.9 kg)   SpO2 100%   BMI 20.91 kg/m²     General appearance: No apparent distress, appears stated age and comfortable on ventilator. HEENT: Pupils equal, round, and reactive to light. Conjunctivae/corneas clear. Neck: Supple, with full range of motion. No jugular venous distention. Trachea midline. Respiratory:  Normal respiratory effort. Clear to auscultation, bilaterally without Rales/Wheezes/Rhonchi. Cardiovascular: Regular rate and rhythm with normal S1/S2 without murmurs, rubs or gallops. Abdomen: Soft, non-tender, non-distended with normal bowel sounds. Musculoskeletal: No clubbing, cyanosis or edema bilaterally. Full range of motion without deformity. Skin: Skin color, texture, turgor normal.  No rashes or lesions. Neurologic:  Neurovascularly intact without any focal sensory/motor deficits. Cranial nerves: II-XII intact, grossly non-focal.  Psychiatric: Unresponsive on ventilator.   Capillary Refill: Brisk,< 3 seconds   Peripheral Pulses: +2 palpable, equal bilaterally       Labs:   Recent Labs     08/15/20  0542 08/15/20  1230 08/15/20  1645 08/16/20  0450   WBC 3.0*  --  2.9* 2.0*   HGB 9.1* 6.7* 9.8* 8.3*   HCT 29.3*  --  31.2* 26.2*   PLT 39*  --  15* 11*     Recent Labs     08/15/20  2120 08/16/20  0135 08/16/20  0450   * 131* 132*   K 4.7 4.8 5.0    100 100   CO2 21 21 20*   BUN 36* 31* 28*   CREATININE 1.4* 1.2 1.2   CALCIUM 6.3* 6.3* 6.5*   PHOS 5.6* 4.8 4.8     Recent Labs     08/15/20  0542   AST 36   ALT 21   BILITOT 0.3   ALKPHOS 36*     Recent Labs     08/14/20  0737 08/14/20  1037   INR 1.03 0.99     No results for input(s): CKTOTAL, TROPONINI in the last 72 hours. Urinalysis:      Lab Results   Component Value Date    NITRU Negative 08/12/2020    WBCUA None seen 08/12/2020    BACTERIA Rare 08/12/2020    RBCUA 3-4 08/12/2020    BLOODU SMALL 08/12/2020    SPECGRAV 1.025 08/12/2020    GLUCOSEU Negative 08/12/2020    GLUCOSEU NEGATIVE 11/30/2011       Radiology:  XR CHEST PORTABLE   Final Result   Right IJ central venous catheter in adequate position. No postprocedure   pneumothorax. Increased advanced bilateral pulmonary disease over the past 2-3 hours         XR CHEST PORTABLE   Final Result   Endotracheal tube tip is approximately 6 cm above the patricia. Worsening bilateral airspace disease. XR CHEST PORTABLE   Final Result   Bibasilar airspace disease, left greater than right. Findings are   new/increased compared to prior concerning for atelectasis versus pneumonia. Pneumoperitoneum, decreased compared to prior. IR PICC WO SQ PORT/PUMP > 5 YEARS   Final Result   Successful placement of PICC line. CT ABDOMEN PELVIS W IV CONTRAST   Final Result   Addendum 1 of 1   ADDENDUM:   Critical results were called by Dr. Lizzie Gonzalez MD to [address] [place] [address] on 8/13/2020 at 20:40.          Final      XR ACUTE ABD SERIES CHEST 1 VW   Final Result   Decreased but persistent mild air-filled 1.  Postop day #2 status post exploratory lap, sigmoidectomy with end colostomy for perforated colon, sepsis requiring 3 pressors, intubated vent management per pulmonary critical care on antibiotic. Family is deciding for withdrawal of care this morning saying goodbye. 2.  Non-Hodgkin's lymphoma management per heme-onc. 3.  Acute kidney injury nephrology consulted currently on CRRT.   4.  Lactic acidosis worsening    DVT Prophylaxis: Heparin subcu  Diet: Diet NPO Effective Now Exceptions are: Sips with Meds, Ice Chips, Popsicles  PN-Adult Premix 5/20 - Standard Electrolytes - Central Line  Code Status: DNR-CC    PT/OT Eval Status:     Ashia Wynn MD

## 2020-08-16 NOTE — PROGRESS NOTES
08/16/20 0002   Vent Information   Vent Type 840   Vent Mode AC/PC   Rate Set 24 bmp   Pressure Support 0 cmH20   FiO2  100 %   Sensitivity 3   PEEP/CPAP 8   I Time/ I Time % 0.7 s   Humidification Source HME   Vent Patient Data   High Peep/I Pressure 30   Peak Inspiratory Pressure 38 cmH2O   Mean Airway Pressure 16 cmH20   Rate Measured 24 br/min   Vt Exhaled 569 mL   Minute Volume 12.8 Liters   I:E Ratio 1:2.60   Cough/Sputum   Sputum How Obtained None   Spontaneous Breathing Trial (SBT) RT Doc   Pulse 71   Breath Sounds   Right Upper Lobe Diminished   Right Middle Lobe Diminished   Right Lower Lobe Diminished   Left Upper Lobe Diminished   Left Lower Lobe Diminished   Additional Respiratory  Assessments   Resp 24   Cuff Pressure (cm H2O) 30 cm H2O   Alarm Settings   High Pressure Alarm 40 cmH2O   Low Minute Volume Alarm 3 L/min   High Respiratory Rate 40 br/min   Non-Surgical Airway Endo Tracheal Tube   Placement Date/Time: 08/14/20 1524   Mask Ventilation: Mask ventilation not attempted (0)  Placed By: Licensed provider  Inserted by: Dr. Ray Linares  Insertion attempts: 1  Airway Device: Endo Tracheal Tube  Size: 8  Placement Verified By[de-identified] Chest X-ray   Secured at 24 cm   Measured From [facility] By [employer] tube hughes   Site Condition Dry

## 2020-08-16 NOTE — PROGRESS NOTES
Call placed to [facility] for an update on the Patient's condition. Telephone order received to call back with the Patient's cardiac time of death.

## 2020-08-16 NOTE — PROGRESS NOTES
08/15/20 2007   Vent Information   Vent Type 840   Vent Mode AC/PC   Rate Set 24 bmp   Pressure Support 0 cmH20   FiO2  100 %   SpO2 (!) 37 %   SpO2/FiO2 ratio 37   Sensitivity 3   PEEP/CPAP 8   I Time/ I Time % 0.7 s   Vent Patient Data   High Peep/I Pressure 30   Peak Inspiratory Pressure 38 cmH2O   Mean Airway Pressure 16 cmH20   Rate Measured 24 br/min   Vt Exhaled 497 mL   Minute Volume 12.5 Liters   I:E Ratio 1:2.60   Plateau Pressure 24 [id number]   Static Compliance 22.5 mL/cmH2O   Dynamic Compliance 31 mL/cmH2O   Cough/Sputum   Sputum How Obtained None   Spontaneous Breathing Trial (SBT) RT Doc   Pulse 75   Breath Sounds   Right Upper Lobe Diminished   Right Middle Lobe Diminished   Right Lower Lobe Diminished   Left Upper Lobe Diminished   Left Lower Lobe Diminished   Additional Respiratory  Assessments   Resp 21   End Tidal CO2 23 (%)   Alarm Settings   High Pressure Alarm 40 cmH2O   Low Minute Volume Alarm 3 L/min   High Respiratory Rate 40 br/min   Low Exhaled Vt  300 mL   Non-Surgical Airway Endo Tracheal Tube   Placement Date/Time: 08/14/20 1524   Mask Ventilation: Mask ventilation not attempted (0)  Placed By: Licensed provider  Inserted by: Dr. Glen Galarza  Insertion attempts: 1  Airway Device: Endo Tracheal Tube  Size: 8  Placement Verified By[de-identified] Chest X-ray   Secured at 24 cm   Measured From Lips   Secured Location Right   Secured By Commercial tube hughes   Site Condition Dry

## 2020-08-16 NOTE — PROGRESS NOTES
[facility] GENERAL SURGERY    Surgery Progress Note           POD # 2    PATIENT NAME: Micha Aiken     TODAY'S DATE: 8/16/2020    INTERVAL HISTORY:    Pt  Failing to improve despite maximal medical support. Remains severely acidotic, unable to tolerate CRRT, on max dose 4 pressors. OBJECTIVE:   VITALS:  /69   Pulse 55   Temp 92 °F (33.3 °C) (Esophageal)   Resp 24   Ht 6' 1\" (1.854 m)   Wt 158 lb 8.2 oz (71.9 kg)   SpO2 (!) 7%   BMI 20.91 kg/m²     INTAKE/OUTPUT:    I/O last 3 completed shifts: In: 450 [Blood:450]  Out: 150 [Drains:150]  I/O this shift: In: 48 [Blood:50]  Out: -               CONSTITUTIONAL:  Intubated, sedated    Data:  CBC:   Recent Labs     08/15/20  0542 08/15/20  1230 08/15/20  1645 08/16/20  0450   WBC 3.0*  --  2.9* 2.0*   HGB 9.1* 6.7* 9.8* 8.3*   HCT 29.3*  --  31.2* 26.2*   PLT 39*  --  15* 11*     BMP:    Recent Labs     08/15/20  2120 08/16/20  0135 08/16/20  0450   * 131* 132*   K 4.7 4.8 5.0    100 100   CO2 21 21 20*   BUN 36* 31* 28*   CREATININE 1.4* 1.2 1.2   GLUCOSE 333* 331* 329*     Hepatic:   Recent Labs     08/15/20  0542   AST 36   ALT 21   BILITOT 0.3   ALKPHOS 36*     Mag:      Recent Labs     08/15/20  2120 08/16/20  0135 08/16/20  0450   MG 1.80 1.80 1.90      Phos:     Recent Labs     08/15/20  2120 08/16/20  0135 08/16/20  0450   PHOS 5.6* 4.8 4.8      INR:   Recent Labs     08/14/20  0737 08/14/20  1037   INR 1.03 0.99         Radiology Review:       ASSESSMENT AND PLAN:  77 y.o. male status post ex lap, sigmoidectomy w/ end colostomy for perforated colon  - plan to withdraw care later today when family arrives   - no further recs at this time. Discussed situation w/ family, answered questions.          Electronically signed by Cheli Amador MD

## 2020-08-16 NOTE — PROGRESS NOTES
Dr. Babar Mcmullen rounding at the bedside. The Patient's condition remains unchanged at this time. ABG drawn at the bedside per verbal order from Dr. Babar Mcmullen. Once the test resulted, Dr. Babar Mcmullen gave a verbal order to administer 2 amps of sodium bicarb and have Atropine on hand to administer if the Patient's heart rate drops below 30.

## 2020-08-16 NOTE — FLOWSHEET NOTE
Followed up with Pt's family per referral from nurse that they were transitioning to comfort care. Family stated they were doing okay right now, had support earlier from their . Appreciated visit, but had no other needs at this time. Assured them of our continued availability for support if needed. [address] [facility]       08/16/20 1146   Encounter Summary   Services provided to: Family   Referral/Consult From: Nurse   Support System Family members   Continue Visiting   (8/16 follow up EOL sppt offered to family)   Complexity of Encounter High   Length of Encounter 15 minutes   Grief and Life Adjustment   Type End of life   Assessment Calm; Approachable;Coping   Intervention Active listening;Explored feelings, thoughts, concerns; Discussed illness/injury and it's impact   Outcome Comfort;Expressed gratitude;Engaged in conversation;Expressed feelings/needs/concerns

## 2020-08-16 NOTE — PROGRESS NOTES
Patient terminally extubated to room air per telephone order from Dr. Reji Olmstead and verbal order from Dr. Porsche Wang.

## 2020-08-16 NOTE — PROGRESS NOTES
This note also relates to the following rows which could not be included:  SpO2 - Cannot attach notes to unvalidated device data       08/16/20 0402   Vent Information   Vent Type 840   Vent Mode AC/PC   Rate Set 24 bmp   Pressure Support 0 cmH20   FiO2  100 %   Sensitivity 3   PEEP/CPAP 8   I Time/ I Time % 0 s   Vent Patient Data   High Peep/I Pressure 30   Peak Inspiratory Pressure 38 cmH2O   Mean Airway Pressure 16 cmH20   Rate Measured 25 br/min   Vt Exhaled 521 mL   Minute Volume 11.9 Liters   I:E Ratio 1:2.60   Cough/Sputum   Sputum How Obtained None   Spontaneous Breathing Trial (SBT) RT Doc   Pulse 55   Breath Sounds   Right Upper Lobe Diminished   Right Middle Lobe Diminished   Right Lower Lobe Diminished   Left Upper Lobe Diminished   Left Lower Lobe Diminished   Additional Respiratory  Assessments   Resp 11   Cuff Pressure (cm H2O) 30 cm H2O   Alarm Settings   High Pressure Alarm 40 cmH2O   Low Minute Volume Alarm 3 L/min   High Respiratory Rate 40 br/min   Non-Surgical Airway Endo Tracheal Tube   Placement Date/Time: 08/14/20 1524   Mask Ventilation: Mask ventilation not attempted (0)  Placed By: Licensed provider  Inserted by: Dr. Brooke Baltazar  Insertion attempts: 1  Airway Device: Endo Tracheal Tube  Size: 8  Placement Verified By[de-identified] Chest X-ray   Secured at 24 cm   Measured From [address] By Commercial tube hughes   Site Condition Dry

## 2020-08-16 NOTE — PROGRESS NOTES
131* 132*   K 4.7 4.8 5.0    100 100   CO2 21 21 20*   PHOS 5.6* 4.8 4.8   BUN 36* 31* 28*   CREATININE 1.4* 1.2 1.2     Recent Labs     08/15/20  0542   AST 36   ALT 21   BILITOT 0.3   ALKPHOS 36*       Magnesium:    Lab Results   Component Value Date    MG 1.90 08/16/2020    MG 1.80 08/16/2020    MG 1.80 08/15/2020     CT A/P 8/7/2020: Moderate amount of pneumoperitoneum, similar to prior. Monika Friday is a small    linear tract of gas marginating the anterior pyloric region of the stomach    anteriorly, which could suggest site of perforation.         Focal collection of fluid and gas marginates the sigmoid colon in the pelvis,    suggesting early pericolonic abscess from perforated diverticulitis.  There    is wall thickening of the sigmoid colon in the pelvis with surrounding    inflammatory change.  Given the lack of significant free air in the pelvis,    this is not likely to be the source of the perforation.         Persistent small bowel wall thickening in the pelvis, likely reactive due to    the underlying pelvic ascites.           Problem List  Patient Active Problem List   Diagnosis    Tobacco abuse disorder    Primary spontaneous pneumothorax    Stage 3 severe COPD by GOLD classification ([address]    Autologous bone marrow transplantation    Mantle cell lymphoma ([address]    Encounter for long-term (current) use of high-risk medication    Chest pain    Spontaneous pneumothorax    PAF (paroxysmal atrial fibrillation) (HCC)    Typical atrial flutter (HCC)    Cigarette smoker    Dermatitis    Pneumoperitoneum    Abdominal pain, left lower quadrant    Acute on chronic respiratory failure with hypoxia ([address]    Septic shock ([address]    TAZ (acute kidney injury) ([address]    Bowel perforation ([address]    Former smoker       ASSESSMENT AND PLAN:      1.) Mantle cell NHL  - Diagnosed initially in in 2012.  - Recently status post treatment with 3rd-line [id number] in the context of a clinical trial between 10/30/2019 - 7/14/2020. - A recent CT scan on 7/31/2020 had shown slight progression. The plan had been for R-Bendamustine to start (8/05/2020) but we will need to hold chemo.  -I do not think he will ever be a chemotherapy candidate.     2.) Exfoliative dermatitis  - Study drug was discontinued due to this adverse event. - We have been treating with Prednisone (Methylpred as an inpatient), foamy cleanser in the AM then an emollient (Aquaphor). 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  - even if he recovered. This appears stable. 3.) Bowel perforation  - Presently treating conservatively with IVF, IV antibitoics, advance diet as tolerated   - surgery following  - The patient is status post surgery for bowel perforation  -He is hypotensive and maxed out on 3 pressors.  -At this time, I think he would benefit from withdrawal of care and hospice.     4.) Anasarca , chronic - venous dopplers negative   - Had been managing with Torsemide as an outpatient  - This issue is on hold for now. - Defer resuming diuretics to nephrology      5.) Atrial fibrillation     6.) COPD    7) Electrolyte disturbance   - Hyperkalemia and hypocalcemia  - Ask nephrology to see     8) TAZ- likely pre- renal  -This is being managed by renal  -Serum creatinine this morning was 2.1  The patient is on CRRT. I do not know how long this will be sustainable given his blood pressure. 9) Heme   -Platelet count is 28,470  -More than likely this is from sepsis  -He is getting platelets this morning  -I would try to keep his platelet count greater than 30,000, but I suspect this may be futile  -Again, I do believe that hospice is appropriate.  -His white blood count is also dropped  -This could be from his disease, sepsis, or merrem    10) Pain   - Give one time dose Dilaudid 1 mg IVP now     His prognosis is very poor and I discussed this with his wife. I also talked to her about how much he would want done.   Currently,

## 2020-08-16 NOTE — PROGRESS NOTES
Dr. Rc Hughes rounding at the bedside. The family states they wish to switch from aggressive care to comfort care and withdraw support once they have finished saying their goodbyes. Verbal order received from Dr. Rc Hughes to withdraw care when the family is ready.

## 2020-08-17 LAB — HEMATOLOGY PATH CONSULT: NORMAL

## 2020-08-17 NOTE — PROGRESS NOTES
Required reporting for death within 7days of removal of 2 point wrist restraints completed- added to facility database.

## 2020-08-17 NOTE — DISCHARGE SUMMARY
Hospital Medicine Discharge Summary    Patient ID: Pedro Alracon      Patient's PCP: Kylee Delvalle MD    Admit Date: 8/4/2020     Discharge Date: 8/16/2020     Admitting Physician: Piero Barba MD     Discharge Physician: Windy Humphrey MD     Discharge Diagnoses: Active Hospital Problems    Diagnosis    Mantle cell lymphoma ([facility] [C83.10]     Priority: High    Acute on chronic respiratory failure with hypoxia (HCC) [J96.21]    Septic shock (HCC) [A41.9, R65.21]    TAZ (acute kidney injury) ([facility] [N17.9]    Bowel perforation ([facility] [K63.1]    Former smoker [Z87.891]    Pneumoperitoneum [K66.8]    Abdominal pain, left lower quadrant [R10.32]    Stage 3 severe COPD by GOLD classification ([facility] [J44.9]       The patient was seen and examined on day of discharge and this discharge summary is in conjunction with any daily progress note from day of discharge. Hospital Course: This is a 78-year-old male with past medical history of non-Hodgkin's lymphoma, mantle cell, anemia, chronic pain admitted with abdominal pain recently discharged on 7/20/2020, patient with increased abdominal pain on 8/14/2020, respiratory failure with hypoxia requiring intubation, hypotension secondary to sepsis possible perforation  for sigmoid colon, went to the OR on 8/14/2020 4 exploratory lap status post sigmoidectomy with end colostomy for perforated colon, patient currently requiring 3 pressors,   on 8/16/2020 family deciding for withdrawal of life support. Patient was taken off the vent. Time of death at 12:19 PM.            Labs:  For convenience and continuity at follow-up the following most recent labs are provided:      CBC:    Lab Results   Component Value Date    WBC 2.0 08/16/2020    HGB 8.3 08/16/2020    HCT 26.2 08/16/2020    PLT 11 08/16/2020       Renal:    Lab Results   Component Value Date     08/16/2020    K 5.0 08/16/2020    K 5.1 08/14/2020     08/16/2020    CO2 20 the pelvis with surrounding   inflammatory change. Given the lack of significant free air in the pelvis,   this is not likely to be the source of the perforation. Persistent small bowel wall thickening in the pelvis, likely reactive due to   the underlying pelvic ascites. CT ABDOMEN PELVIS WO CONTRAST Additional Contrast? Oral   Final Result   No extraluminal oral contrast to indicate a site of gastrointestinal   perforation         CT ABDOMEN PELVIS W IV CONTRAST Additional Contrast? None   Final Result   Addendum 1 of    ADDENDUM:   These results were communicated by telephone to Dr. Lisa Jaffe at 11:06 a.m.    on   2020         Final             Consults:     IP CONSULT TO GENERAL SURGERY  IP CONSULT TO GENERAL SURGERY  IP CONSULT TO HOSPITALIST  IP CONSULT TO ONCOLOGY  IP CONSULT TO NEPHROLOGY  IP CONSULT TO PULMONOLOGY    Patient  at 12:19 PM          Time Spent on discharge is more than 35 minutes in the examination, evaluation, counseling and review of medications and discharge plan. Signed:    Joanne Jaffe MD   2020      Thank you Alia Cabral MD for the opportunity to be involved in this patient's care. If you have any questions or concerns please feel free to contact me at [phone].

## 2021-04-22 NOTE — PROGRESS NOTES
Comprehensive Nutrition Assessment    Type and Reason for Visit:  Initial, Positive Nutrition Screen(weight loss, wound)    Nutrition Recommendations/Plan:   1. Continue clear liquid diet - advance per surgery   2. Add Ensure Clears TID   3. If unable to advance PO diet further, recommend early initiation of TPN. Consult dietitian if desired. 4. Monitor nutrition adequacy, pertinent labs, bowel habits, wt changes, and clinical progress    Nutrition Assessment:  Pt admitted with bowel perf. Hx of mantel cell NHL, planned to resume chemo today - on hold for now. Pt nutritionally compromised AEB NPO/clear liquid diet. Surgery advanced diet to clear liquids today, hoping for GI improvement without surgical intervention. If unable to further advance PO diet, recommend early TPN administration as pt planning for chemo therapy and fluxuating weights. Pt reports good appetite and PO intakes PTA. No c/o N/V. Will add ONS. Will continue to monitor. Malnutrition Assessment:  Malnutrition Status:   At risk for malnutrition (Comment)      Estimated Daily Nutrient Needs:  Energy (kcal):  9407-2014 kcal; Weight Used for Energy Requirements:  Current(74.5 kg)     Protein (g):   g; Weight Used for Protein Requirements:  Current(1.2-1.5 g/kg)        Fluid (ml/day):  1 ml/kcal; Weight Used for Fluid Requirements:  Current      Nutrition Related Findings:  +4 generalized edema, Megace, loose stool 8/4      Wounds:  Pressure Ulcer, Stage II       Current Nutrition Therapies:    DIET CLEAR LIQUID;  Current Parenteral Nutrition Orders:  · Goal PN Orders Provides: Clinimix 5/20 at goal rate of 90 mL/hr to provide 108 g protein and 1901 kcal. 250 mL bags of 20% lipids 5x weekly for additional 357 kcal daily, total kcals of 2258 kcal. GIR: 4.0      Anthropometric Measures:  · Height: 6' 1\" (185.4 cm)  · Current Body Weight: 164 lb (74.4 kg)   · Usual Body Weight: (148-150 lb per pt)     · Ideal Body Weight: 184 lbs; % Ideal Body Weight 89.1 %   · BMI: 21.6  · BMI Categories: Underweight (BMI less than 22) age over 72       Nutrition Diagnosis:   · Inadequate oral intake related to inadequate protein-energy intake, lack or limited access to food as evidenced by NPO or clear liquid status due to medical condition      Nutrition Interventions:   Food and/or Nutrient Delivery:  Continue Current Diet, Start Oral Nutrition Supplement(Consider TPN if unable to further advance PO diet)  Nutrition Education/Counseling:  No recommendation at this time   Coordination of Nutrition Care:  Continued Inpatient Monitoring    Goals:  Pt will tolerate most appropriate form of nutrition this admission       Nutrition Monitoring and Evaluation:   Food/Nutrient Intake Outcomes:  Food and Nutrient Intake, Supplement Intake, Diet Advancement/Tolerance  Physical Signs/Symptoms Outcomes:  Biochemical Data, GI Status, Weight     Discharge Planning:     Too soon to determine     Electronically signed by Bryson Yepez MS, RD, LD on 8/5/20 at 1:51 PM EDT    Contact: Office: [phone] English

## 2021-07-23 NOTE — PROGRESS NOTES
RESPIRATORY THERAPY ASSESSMENT    Name:  Nichelle Humphries  Medical Record Number:  [id number]  Age: 77 y.o. Gender: male  : 1945  Today's Date:  2020  Room:  0357/0357-01    Assessment     Is the patient being admitted for a COPD or Asthma exacerbation? No   (If yes the patient will be seen every 4 hours for the first 24 hours and then reassessed)    Patient Admission Diagnosis      Allergies  Allergies   Allergen Reactions    Latex Rash    Adhesive Tape      Rash       Minimum Predicted Vital Capacity:     1200          Actual Vital Capacity:      1800              Pulmonary History:COPD, Asthma, Pulmonary Fibrosis, CHF/Pulmonary Edema and Lung CA, Bronchiectasis   Home Oxygen Therapy:  room air  Home Respiratory Therapy:Albuterol MDI prn  Current Respiratory Therapy:  Albuterol MDI prn  Treatment Type: IS       Respiratory Severity Index(RSI)   Patients with orders for inhalation medications, oxygen, or any therapeutic treatment modality will be placed on Respiratory Protocol. They will be assessed with the first treatment and at least every 72 hours thereafter. The following severity scale will be used to determine frequency of treatment intervention. Smoking History: Pulmonary Disease or Smoking History, Greater than 15 pack year = 2    Social History  Social History     Tobacco Use    Smoking status: Current Every Day Smoker     Packs/day: 0.50     Years: 50.00     Pack years: 25.00     Types: Cigarettes    Smokeless tobacco: Never Used    Tobacco comment: 1-2 cigs daily - 3/5/20   Substance Use Topics    Alcohol use:  Yes     Alcohol/week: 0.0 standard drinks     Comment: 8 pack beer weekly    Drug use: No       Recent Surgical History: None = 0  Past Surgical History  Past Surgical History:   Procedure Laterality Date    CHEST TUBE INSERTION      x3    COLONOSCOPY      polyps q 5 yrs    COLONOSCOPY  5/15/13    FEMUR SURGERY      r/t fracture with retained hardware    FRACTURE SURGERY  1960    broken arm    FRACTURE SURGERY  2003    femur    LYMPH NODE BIOPSY  12/11/12    right inguinal lymph node excision and biopsy    MIDDLE EAR SURGERY      OTHER SURGICAL HISTORY  6/7/12    stem cell transplant    SKIN BIOPSY      SKIN CANCER DESTRUCTION      STOMACH SURGERY      at age 7 weeks    THORACOSCOPY Left 01/08/2018    video assisted thoracoscopy,left upper lobe wedge resection;mechanical and chemical pleurodesis    TUNNELED VENOUS PORT PLACEMENT         Level of Consciousness: Alert, Oriented, and Cooperative = 0    Level of Activity: Walking with assistance = 1    Respiratory Pattern: Regular Pattern; RR 8-20 = 0    Breath Sounds: Diminshed bilaterally and/or crackles = 2    Sputum   ,  ,    Cough: Strong, spontaneous, non-productive = 0    Vital Signs   /71   Pulse 98   Temp 98.1 °F (36.7 °C) (Oral)   Resp 16   Ht 6' 1\" (1.854 m)   Wt 150 lb (68 kg)   SpO2 98%   BMI 19.79 kg/m²   SPO2 (COPD values may differ): Greater than or equal to 92% on room air = 0    Peak Flow (asthma only): not applicable = 0    RSI: 5-6 = Q4hr PRN (every four hours as needed) for dyspnea        Plan       Goals: medication delivery and volume expansion    Patient/caregiver was educated on the proper method of use for Respiratory Care Devices:  Yes      Level of patient/caregiver understanding able to:   ? Verbalize understanding   ? Demonstrate understanding       ? Teach back        ? Needs reinforcement       ? No available caregiver               ? Other:     Response to education:  Excellent     Is patient being placed on Home Treatment Regimen? Yes     Does the patient have everything they need prior to discharge? NA     Comments: Patient and chart assessed    Plan of Care: Patient is on home treatment regimen. No further assessment needed unless patient condition changes.     Electronically signed by Mich Iglesias RCP on 8/4/2020 at 4:59 PM    Respiratory Protocol Guidelines 1. Assessment and treatment by Respiratory Therapy will be initiated for medication and therapeutic interventions upon initiation of aerosolized medication. 2. Physician will be contacted for respiratory rate (RR) greater than 35 breaths per minute. Therapy will be held for heart rate (HR) greater than 140 beats per minute, pending direction from physician. 3. Bronchodilators will be administered via Metered Dose Inhaler (MDI) with spacer when the following criteria are met:  a. Alert and cooperative     b. HR < 140 bpm  c. RR < 30 bpm                d. Can demonstrate a 2-3 second inspiratory hold  4. Bronchodilators will be administered via Hand Held Nebulizer [facility]) to patients when ANY of the following criteria are met  a. Incognizant or uncooperative          b. Patients treated with HHN at Home        c. Unable to demonstrate proper use of MDI with spacer     d. RR > 30 bpm   5. Bronchodilators will be delivered via Metered Dose Inhaler (MDI), HHN, Aerogen to intubated patients on mechanical ventilation. 6. Inhalation medication orders will be delivered and/or substituted as outlined below. Aerosolized Medications Ordering and Administration Guidelines:    1. All Medications will be ordered by a physician, and their frequency and/or modality will be adjusted as defined by the patients Respiratory Severity Index (RSI) score. 2. If the patient does not have documented COPD, consider discontinuing anticholinergics when RSI is less than 9.  3. If the bronchospasm worsens (increased RSI), then the bronchodilator frequency can be increased to a maximum of every 4 hours. If greater than every 4 hours is required, the physician will be contacted. 4. If the bronchospasm improves, the frequency of the bronchodilator can be decreased, based on the patient's RSI, but not less than home treatment regimen frequency.   5. Bronchodilator(s) will be discontinued if patient has a RSI less than 9 and has received no scheduled or as needed treatment for 72  Hrs. Patients Ordered on a Mucolytic Agent:    1. Must always be administered with a bronchodilator. 2. Discontinue if patient experiences worsened bronchospasm, or secretions have lessened to the point that the patient is able to clear them with a cough. Anti-inflammatory and Combination Medications:    1. If the patient lacks prior history of lung disease, is not using inhaled anti-inflammatory medication at home, and lacks wheezing by examination or by history for at least 24 hours, contact physician for possible discontinuation. Ear Star Wedge Flap Text: The defect edges were debeveled with a #15 blade scalpel.  Given the location of the defect and the proximity to free margins (helical rim) an ear star wedge flap was deemed most appropriate.  Using a sterile surgical marker, the appropriate flap was drawn incorporating the defect and placing the expected incisions between the helical rim and antihelix where possible.  The area thus outlined was incised through and through with a #15 scalpel blade.

## 2021-09-24 NOTE — PLAN OF CARE
Problem: Nutrition  Goal: Optimal nutrition therapy  Outcome: Ongoing  Note: Nutrition Problem #1: Inadequate oral intake  Intervention: Food and/or Nutrient Delivery: Continue Current Diet, Start Oral Nutrition Supplement  Nutritional Goals: Pt will tolerate most appropriate form of nutrition this admission ambulatory

## (undated) DEVICE — Device

## (undated) DEVICE — SUTURE ETHLN SZ 2-0 L20IN NONABSORBABLE BLK LR L75MM 3/8 470G

## (undated) DEVICE — Z DISCONTINUED USE 2716239 STAPLER INT STPL 51MM CUT LN L40MM STD TISS CRV CUT CR40B

## (undated) DEVICE — SUTURE PROL SZ 2-0 L30IN NONABSORBABLE BLU L26MM SH 1/2 CIR 8833H

## (undated) DEVICE — DRAIN SURG 15FR L3/16IN DIA4.7MM SIL CHN RND HUBLESS FULL

## (undated) DEVICE — SUTURE PDS II SZ 0 L60IN ABSRB VLT L48MM CTX 1/2 CIR Z990G

## (undated) DEVICE — SEALER TISS L20CM DIA13MM ADV BPLR L CRV JAW OPN APPRCH

## (undated) DEVICE — GOWN SIRUS NONREIN XL W/TWL: Brand: MEDLINE INDUSTRIES, INC.

## (undated) DEVICE — Z DISCONTINUED USE 2716237 RELOAD STPL L40MM H1.5-3.5MM WIRE DIA0.2MM REG TISS BLU CRV

## (undated) DEVICE — RELOAD STPL L75MM OPN H3.8MM CLS 1.5MM WIRE DIA0.2MM REG

## (undated) DEVICE — SUTURE VCRL + SZ 3-0 L18IN ABSRB UD SH 1/2 CIR TAPERCUT NDL VCP864D

## (undated) DEVICE — RESERVOIR,SUCTION,100CC,SILICONE: Brand: MEDLINE

## (undated) DEVICE — BLADE ES L6IN ELASTOMERIC COAT EXT DURABLE BEND UPTO 90DEG

## (undated) DEVICE — DRESSING FOAM W4XL12IN AG SIL ADH ANTIMIC POSTOP OPTIFOAM

## (undated) DEVICE — SUTURE PERMAHAND SZ 3-0 L30IN NONABSORBABLE BLK L26MM SH C017D

## (undated) DEVICE — SUTURE PROL SZ 3-0 L36IN NONABSORBABLE BLU L26MM SH 1/2 CIR 8522H

## (undated) DEVICE — SEALER TISS L14CM ADV BPLR STR RND TIP OPN APPRCH ENSEAL

## (undated) DEVICE — SUTURE PERMAHAND SZ 3-0 L18IN NONABSORBABLE BLK L26MM SH C013D

## (undated) DEVICE — SUTURE VCRL + SZ 0 L27IN ABSRB VLT L26MM UR-6 5/8 CIR VCP603H

## (undated) DEVICE — STAPLER INT L75MM CUT LN L73MM STPL LN L77MM BLU B FRM 8

## (undated) DEVICE — SPONGE LAP W18XL18IN WHT COT 4 PLY FLD STRUNG RADPQ DISP ST

## (undated) DEVICE — SOLUTION IV IRRIG POUR BRL 0.9% SODIUM CHL 2F7124

## (undated) DEVICE — Device: Brand: SENSURA MIO